# Patient Record
Sex: MALE | Race: WHITE | NOT HISPANIC OR LATINO | URBAN - METROPOLITAN AREA
[De-identification: names, ages, dates, MRNs, and addresses within clinical notes are randomized per-mention and may not be internally consistent; named-entity substitution may affect disease eponyms.]

---

## 2017-01-13 ENCOUNTER — EMERGENCY (EMERGENCY)
Facility: HOSPITAL | Age: 67
LOS: 1 days | End: 2017-01-13
Payer: MEDICARE

## 2017-01-13 DIAGNOSIS — K64.9 UNSPECIFIED HEMORRHOIDS: Chronic | ICD-10-CM

## 2017-01-13 DIAGNOSIS — M51.9 UNSPECIFIED THORACIC, THORACOLUMBAR AND LUMBOSACRAL INTERVERTEBRAL DISC DISORDER: Chronic | ICD-10-CM

## 2017-01-13 PROCEDURE — 71010: CPT | Mod: 26

## 2017-01-13 PROCEDURE — 99285 EMERGENCY DEPT VISIT HI MDM: CPT

## 2017-01-16 ENCOUNTER — APPOINTMENT (OUTPATIENT)
Dept: CARDIOLOGY | Facility: CLINIC | Age: 67
End: 2017-01-16

## 2017-03-10 ENCOUNTER — APPOINTMENT (OUTPATIENT)
Dept: CARDIOLOGY | Facility: CLINIC | Age: 67
End: 2017-03-10

## 2018-11-15 ENCOUNTER — OUTPATIENT (OUTPATIENT)
Dept: OUTPATIENT SERVICES | Facility: HOSPITAL | Age: 68
LOS: 1 days | End: 2018-11-15

## 2018-11-15 ENCOUNTER — EMERGENCY (EMERGENCY)
Facility: HOSPITAL | Age: 68
LOS: 1 days | End: 2018-11-15
Payer: MEDICARE

## 2018-11-15 DIAGNOSIS — M51.9 UNSPECIFIED THORACIC, THORACOLUMBAR AND LUMBOSACRAL INTERVERTEBRAL DISC DISORDER: Chronic | ICD-10-CM

## 2018-11-15 DIAGNOSIS — K64.9 UNSPECIFIED HEMORRHOIDS: Chronic | ICD-10-CM

## 2018-11-15 PROCEDURE — 71046 X-RAY EXAM CHEST 2 VIEWS: CPT | Mod: 26

## 2018-11-15 PROCEDURE — 99285 EMERGENCY DEPT VISIT HI MDM: CPT

## 2018-11-15 PROCEDURE — 76705 ECHO EXAM OF ABDOMEN: CPT | Mod: 26

## 2018-11-16 ENCOUNTER — OUTPATIENT (OUTPATIENT)
Dept: OUTPATIENT SERVICES | Facility: HOSPITAL | Age: 68
LOS: 1 days | End: 2018-11-16

## 2018-11-16 DIAGNOSIS — M51.9 UNSPECIFIED THORACIC, THORACOLUMBAR AND LUMBOSACRAL INTERVERTEBRAL DISC DISORDER: Chronic | ICD-10-CM

## 2018-11-16 DIAGNOSIS — K64.9 UNSPECIFIED HEMORRHOIDS: Chronic | ICD-10-CM

## 2018-11-16 PROCEDURE — 78226 HEPATOBILIARY SYSTEM IMAGING: CPT | Mod: 26

## 2018-11-16 PROCEDURE — 99214 OFFICE O/P EST MOD 30 MIN: CPT

## 2018-12-11 ENCOUNTER — APPOINTMENT (OUTPATIENT)
Dept: CARDIOLOGY | Facility: CLINIC | Age: 68
End: 2018-12-11
Payer: MEDICARE

## 2018-12-11 ENCOUNTER — NON-APPOINTMENT (OUTPATIENT)
Age: 68
End: 2018-12-11

## 2018-12-11 VITALS
DIASTOLIC BLOOD PRESSURE: 80 MMHG | BODY MASS INDEX: 27.52 KG/M2 | RESPIRATION RATE: 14 BRPM | SYSTOLIC BLOOD PRESSURE: 162 MMHG | WEIGHT: 201 LBS | HEIGHT: 71.5 IN | OXYGEN SATURATION: 97 % | HEART RATE: 74 BPM

## 2018-12-11 DIAGNOSIS — Z00.00 ENCOUNTER FOR GENERAL ADULT MEDICAL EXAMINATION W/OUT ABNORMAL FINDINGS: ICD-10-CM

## 2018-12-11 DIAGNOSIS — Z78.9 OTHER SPECIFIED HEALTH STATUS: ICD-10-CM

## 2018-12-11 PROCEDURE — 99215 OFFICE O/P EST HI 40 MIN: CPT

## 2018-12-11 RX ORDER — AZILSARTAN KAMEDOXOMIL AND CHLORTHALIDONE 40; 25 MG/1; MG/1
40-25 TABLET ORAL
Refills: 0 | Status: DISCONTINUED | COMMUNITY

## 2018-12-12 RX ORDER — AZILSARTAN KAMEDOXOMIL AND CHLORTHALIDONE 40; 25 MG/1; MG/1
40-25 TABLET ORAL DAILY
Qty: 90 | Refills: 0 | Status: ACTIVE | COMMUNITY
Start: 1900-01-01 | End: 1900-01-01

## 2018-12-12 NOTE — REVIEW OF SYSTEMS
[Shortness Of Breath] : no shortness of breath [Chest Pain] : no chest pain [Dizziness] : no dizziness

## 2018-12-12 NOTE — REASON FOR VISIT
[FreeTextEntry1] : Mr. rBowne is a 68-year-old man that came in today for cardiac followup. She was last seen in the office 1-16-17. \par He presented to [facility] on 11-15-18 with complaints of sudden onset of left upper quadrant discomfort that radiated to the epigastric area to substernum,. He was also complaining of left arm tingling, diaphoresis and lightheadedness. Episode lasted approximately 2 hours. Blood pressure upon arrival 187/78 with a pulse of 55. (White count was slightly elevated at 11.1 on 11-15-18, AST/ALT were 119/66 and lipase was elevated at 91.)\par Given the diaphoresis and associated symptoms he was placed on telemetry to r/o ACS. Cholelithiasis was noted on abdominal ultrasound. (patient states was told not surgical candidate). Requesting discharge summary. \par Cardiac consult noted no EKG changes and negative troponins. Overall low likelihood of ACS. Given his hypertension losartan/hctz was substituted for Edarbyclor and patient was temporarily put on Toprol and Norvasc. Outpatient stress testing was advised.\par \par Since discharge he has changed his diet and has felt well.  He denies chest pain, sob, palpitaitons, dizziness, syncope, orthopnea and PND. 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 No exercise regimen. \par  \par As you know he is a history of hypertension, nonobstructive CAD by CTA.\par \par Labs/tests\par \par Echocardiogram 7-19-16 60% with normal ventricular function, mild diastolic dysfunction, mild LAE, aneurysmal interatrial septum, descending aorta 4.0, trace to mild MR/TR\par \par Carotid ultrasound 7-19-16 mild nonobstructive disease bilaterally\par \par CT chest 6-11-15 calcium score 1256, LAD 50%, left circumflex  less than 50% and RCA less than 50%\par \par EKG 11-15-18 sinus bradycardia with LAD, LAHB, IVCD, PRP and nonspecific ST segment changes\par \par Labs 11-16-18 White blood cell 5.0, hemoglobin 14, hematocrit 42.4, platelets 208, d-dimer less than 200, sodium 142, potassium 4.0, BUN 16, creatinine 0.8, , , magnesium 2.0, amylase 72, lipase 42\par \par Labs 11-15-18 : < 0.010 x 2, BNP 55.5\par \par Chest x-ray 11-16-18 no active disease\par \par Abdominal ultrasound 11-16-18 cholelithiasis with borderline wall thickening of the gallbladder raising suspicion for cholecystitis\par \par HIDA scan 11-16-18 unremarkable

## 2018-12-12 NOTE — PHYSICAL EXAM
[Normal Appearance] : normal appearance [No Deformities] : no deformities [] : no respiratory distress [Respiration, Rhythm And Depth] : normal respiratory rhythm and effort [Exaggerated Use Of Accessory Muscles For Inspiration] : no accessory muscle use [Heart Rate And Rhythm] : heart rate and rhythm were normal [Heart Sounds] : normal S1 and S2 [Bowel Sounds] : normal bowel sounds [Abdomen Soft] : soft [Abdomen Tenderness] : non-tender [Abnormal Walk] : normal gait [Nail Clubbing] : no clubbing of the fingernails [Cyanosis, Localized] : no localized cyanosis [Affect] : the affect was normal [Mood] : the mood was normal [FreeTextEntry1] : mild b/l l/e edema with 2+ b/l l/e distal pulse

## 2018-12-12 NOTE — ASSESSMENT
[FreeTextEntry1] : PLAN 12-11-18\par \par -Left upper quadrant discomfort radiating to the sternum with cholilithiasis noted on abdominal ultrasound. ACS ruled out in the hospital. Patient has remained asymptomatic with a good level of functional status since he changed his diet. Given his known CAD by CTA as mentioned above and additional risk factors of age, gender, and unclear lipid profile we have recommended a nuclear stress test for further ischemic evaluation. We have also recommended an echocardiogram to further evaluate EF, ventricular wall motion, valvular function, chamber size and valvular pathology.He is currently stable and asymptomatic. He's advised to start baby aspirin 81 mg daily which he is aware of but still has not done. \par \par -lab requisition supplied to follow lipid and hepatic function (note previous hepatic function elevation). \par \par -Hypertension. Repeat blood pressure 156/84. Currently on Edarbyclor. He has been advised to start Norvasc 5 mg one tablet p.o. q.d. To follow strict lifestyle modification. To follow blood pressure at next visit. (note bradycardia on holter in the past)\par \par -Cholelithiasis, deemed not a surgical candidate. Would advise patient to follow closely with primary and GI\par \par -VHD. To be followed by echo as above. \par \par The patient is aware of the importance of f/u. We will follow after recommended testing has been completed, sooner if changes in symptoms or status.\par \par Sincerely,\par \par Thuy Mcdowell PA-C\par patient reviewed and plan advised by supervising physician Dr. Grijalva\par \par \par \par

## 2020-04-28 ENCOUNTER — EMERGENCY (EMERGENCY)
Facility: HOSPITAL | Age: 70
LOS: 1 days | End: 2020-04-28
Admitting: EMERGENCY MEDICINE
Payer: MEDICARE

## 2020-04-28 ENCOUNTER — INPATIENT (INPATIENT)
Facility: HOSPITAL | Age: 70
LOS: 7 days | Discharge: HOME | DRG: 439 | End: 2020-05-06
Attending: SURGERY | Admitting: SURGERY
Payer: MEDICARE

## 2020-04-28 VITALS
OXYGEN SATURATION: 97 % | WEIGHT: 175.05 LBS | DIASTOLIC BLOOD PRESSURE: 88 MMHG | HEIGHT: 68 IN | TEMPERATURE: 99 F | SYSTOLIC BLOOD PRESSURE: 188 MMHG | RESPIRATION RATE: 16 BRPM | HEART RATE: 62 BPM

## 2020-04-28 DIAGNOSIS — M51.9 UNSPECIFIED THORACIC, THORACOLUMBAR AND LUMBOSACRAL INTERVERTEBRAL DISC DISORDER: Chronic | ICD-10-CM

## 2020-04-28 DIAGNOSIS — K64.9 UNSPECIFIED HEMORRHOIDS: Chronic | ICD-10-CM

## 2020-04-28 PROCEDURE — 76705 ECHO EXAM OF ABDOMEN: CPT | Mod: 26

## 2020-04-28 PROCEDURE — 74177 CT ABD & PELVIS W/CONTRAST: CPT | Mod: 26

## 2020-04-28 PROCEDURE — 99285 EMERGENCY DEPT VISIT HI MDM: CPT

## 2020-04-28 RX ORDER — ONDANSETRON 8 MG/1
4 TABLET, FILM COATED ORAL ONCE
Refills: 0 | Status: COMPLETED | OUTPATIENT
Start: 2020-04-28 | End: 2020-04-29

## 2020-04-28 RX ORDER — MORPHINE SULFATE 50 MG/1
4 CAPSULE, EXTENDED RELEASE ORAL ONCE
Refills: 0 | Status: DISCONTINUED | OUTPATIENT
Start: 2020-04-28 | End: 2020-04-28

## 2020-04-28 NOTE — ED PROVIDER NOTE - CLINICAL SUMMARY MEDICAL DECISION MAKING FREE TEXT BOX
70 y/o M pt BIBA with significant PMHx of presents to the ED c/o 7/10 non-radiating, left sided abdominal pain that started last night, with associated N/V. 70 y/o M pt BIBA with significant PMHx of HTN presents to the ED c/o 7/10 non-radiating, left sided abdominal pain that started last night, with associated nausea abdominal 5 episodes of NBNB emesis. 70 y/o M pt BIBA with significant PMHx of HTN presents to the ED c/o 7/10 non-radiating, left sided abdominal pain that started last night, with associated nausea abdominal 5 episodes of NBNB emesis. Pt transferred fro, [facility] for pancreatitis and cholcystitis, will admit to surgery for further management

## 2020-04-28 NOTE — ED ADULT NURSE NOTE - OBJECTIVE STATEMENT
Pt. presents A+Ox4 to ED as TRANSFER from [facility]. Pt. sent for +rodriguez, needing ERCP intervention as per transfer center. Pt. presents calm, cooperative, and smiling, complaining of some mild nausea and 7/10 generalized abdominal pain. Pt. reports having emesis earlier today and en route to [facility]. Pt. denies diarrhea, fever, body aches, chills. 20g R AC and 20g L Forearm IV's from [facility], intact and patent with good blood return. Pt. CLARK, skin warm and dry, pt ambulating independently without difficulty in room . Pt. presents A+Ox4 to ED as TRANSFER from [facility]. Pt. sent for +cholecystitis, sent for GI/surg. Pt. presents calm, cooperative, and smiling, complaining of some mild nausea and 7/10 generalized abdominal pain. Pt. reports having emesis earlier today and en route to [facility]. No active vomiting at this time. Pt. denies nausea at this time. Pt. denies diarrhea, fever, body aches, chills. 20g R AC and 20g L Forearm IV's from [facility], intact and patent with good blood return. Pt. CLARK, skin warm and dry, pt ambulating independently without difficulty in room . Pt. presents A+Ox4 to ED as TRANSFER from Health system. Pt. sent for +cholecystitis, sent for GI/surg. Pt. presents calm, cooperative, and smiling, complaining of some mild nausea and 7/10 generalized abdominal pain. Pt. reports having emesis earlier today and en route to [facility]. No active vomiting at this time. Pt. denies diarrhea, fever, body aches, chills. 20g R AC and 20g L Forearm IV's from [facility], intact and patent with good blood return. Pt. CLARK, skin warm and dry, slightly jaundiced appearance. Pt ambulating independently without difficulty in room.

## 2020-04-28 NOTE — ED ADULT TRIAGE NOTE - CHIEF COMPLAINT QUOTE
Pt A&Ox4 states "I was having stomach pain."  BIBA from [facility] [place] c/o Left sided abd pain and vomiting being sent for ERCP, has Cholecystitis. Patient denies any sick contacts.

## 2020-04-28 NOTE — ED ADULT NURSE NOTE - CHIEF COMPLAINT QUOTE
Pt A&Ox4 states "I was having stomach pain."  BIBA from [facility] c/o Left sided abd pain and vomiting being sent for ERCP, has Cholecystitis. Patient denies any sick contacts.

## 2020-04-28 NOTE — ED ADULT NURSE NOTE - PMH
Bigeminy    BPH (benign prostatic hypertrophy)    Exostosis  left ear canal  Hard of hearing  Exostosis left ear  in May 2015  Hypertension    Lumbar disc disease

## 2020-04-28 NOTE — ED PROVIDER NOTE - OBJECTIVE STATEMENT
70 y/o M pt BIBA with significant PMHx of HTN presents to the ED c/o 7/10 non-radiating, left sided abdominal pain that started last night, with associated nausea abdominal 5 episodes of NBNB emesis. Pt states that the pain started after he ate dinner and adds that for the past few weeks he has been eating a lot of cheese. Pt is a transfer from [facility]. At [facility] pt had blood work showing elevated LFTs, AST of 910, ALT of 689, Lipase 2381, Amylase 1140, bilirubin 3.2, and direct april 3.43. Pt also had a CT abd/pelvis that showed mild interstitial pancreatitis, with gallbladder wall thickening. Concern for acute colicystitis. Ultrasound showed gallstones with evidence for acute colicystitis. Pt denies fevers/chills, ha, loc, focal neuro deficits, cp/sob/palp, cough, diarrhea, urinary symptoms, recent travel and sick contacts. 68 y/o M pt BIBA with significant PMHx of HTN presents to the ED c/o 7/10 non-radiating, left sided abdominal pain that started last night, with associated nausea abdominal 5 episodes of NBNB emesis. Pt states that the pain started after he ate dinner and adds that for the past few weeks he has been eating a lot of cheese. 2 years ago he had a similar problem and had an MRI done that did not show "much" as per pt, however, the pain resolved so he didn't worry. Pt is a transfer from [facility] [place] [facility]. At [facility] pt had blood work showing elevated LFTs, AST of 910, ALT of 689, Lipase 2381, Amylase 1140, bilirubin 3.2, and direct april 2.4. Pt also had a CT abd/pelvis that showed mild interstitial pancreatitis, with gallbladder wall thickening. Concern for acute colicystitis. Ultrasound showed gallstones with evidence for acute colicystitis. Pt received Morphine and Zofran at 15:20, and Zosyn at 20:00. Pt is on Edarbyclor but has been off of it for 2 days. Pt denies fevers/chills, ha, loc, focal neuro deficits, cp/sob/palp, cough, diarrhea, urinary symptoms, recent travel and sick contacts.

## 2020-04-29 DIAGNOSIS — K85.90 ACUTE PANCREATITIS WITHOUT NECROSIS OR INFECTION, UNSPECIFIED: ICD-10-CM

## 2020-04-29 LAB
ALBUMIN SERPL ELPH-MCNC: 3.7 G/DL — SIGNIFICANT CHANGE UP (ref 3.3–5.2)
ALBUMIN SERPL ELPH-MCNC: 3.8 G/DL — SIGNIFICANT CHANGE UP (ref 3.3–5.2)
ALP SERPL-CCNC: 86 U/L — SIGNIFICANT CHANGE UP (ref 40–120)
ALP SERPL-CCNC: 89 U/L — SIGNIFICANT CHANGE UP (ref 40–120)
ALT FLD-CCNC: 597 U/L — HIGH
ALT FLD-CCNC: 696 U/L — HIGH
ANION GAP SERPL CALC-SCNC: 14 MMOL/L — SIGNIFICANT CHANGE UP (ref 5–17)
ANION GAP SERPL CALC-SCNC: 14 MMOL/L — SIGNIFICANT CHANGE UP (ref 5–17)
AST SERPL-CCNC: 466 U/L — HIGH
AST SERPL-CCNC: 639 U/L — HIGH
BASOPHILS # BLD AUTO: 0.02 K/UL — SIGNIFICANT CHANGE UP (ref 0–0.2)
BASOPHILS # BLD AUTO: 0.03 K/UL — SIGNIFICANT CHANGE UP (ref 0–0.2)
BASOPHILS NFR BLD AUTO: 0.2 % — SIGNIFICANT CHANGE UP (ref 0–2)
BASOPHILS NFR BLD AUTO: 0.3 % — SIGNIFICANT CHANGE UP (ref 0–2)
BILIRUB DIRECT SERPL-MCNC: 1.2 MG/DL — HIGH (ref 0–0.3)
BILIRUB DIRECT SERPL-MCNC: 1.8 MG/DL — HIGH (ref 0–0.3)
BILIRUB INDIRECT FLD-MCNC: 1.1 MG/DL — HIGH (ref 0.2–1)
BILIRUB SERPL-MCNC: 2.3 MG/DL — HIGH (ref 0.4–2)
BILIRUB SERPL-MCNC: 2.7 MG/DL — HIGH (ref 0.4–2)
BUN SERPL-MCNC: 22 MG/DL — HIGH (ref 8–20)
BUN SERPL-MCNC: 24 MG/DL — HIGH (ref 8–20)
CALCIUM SERPL-MCNC: 8.8 MG/DL — SIGNIFICANT CHANGE UP (ref 8.6–10.2)
CALCIUM SERPL-MCNC: 9.1 MG/DL — SIGNIFICANT CHANGE UP (ref 8.6–10.2)
CHLORIDE SERPL-SCNC: 100 MMOL/L — SIGNIFICANT CHANGE UP (ref 98–107)
CHLORIDE SERPL-SCNC: 96 MMOL/L — LOW (ref 98–107)
CO2 SERPL-SCNC: 29 MMOL/L — SIGNIFICANT CHANGE UP (ref 22–29)
CO2 SERPL-SCNC: 30 MMOL/L — HIGH (ref 22–29)
CREAT SERPL-MCNC: 1.01 MG/DL — SIGNIFICANT CHANGE UP (ref 0.5–1.3)
CREAT SERPL-MCNC: 1.08 MG/DL — SIGNIFICANT CHANGE UP (ref 0.5–1.3)
EOSINOPHIL # BLD AUTO: 0.01 K/UL — SIGNIFICANT CHANGE UP (ref 0–0.5)
EOSINOPHIL # BLD AUTO: 0.08 K/UL — SIGNIFICANT CHANGE UP (ref 0–0.5)
EOSINOPHIL NFR BLD AUTO: 0.1 % — SIGNIFICANT CHANGE UP (ref 0–6)
EOSINOPHIL NFR BLD AUTO: 0.8 % — SIGNIFICANT CHANGE UP (ref 0–6)
GLUCOSE SERPL-MCNC: 124 MG/DL — HIGH (ref 70–99)
GLUCOSE SERPL-MCNC: 154 MG/DL — HIGH (ref 70–99)
HCT VFR BLD CALC: 41.5 % — SIGNIFICANT CHANGE UP (ref 39–50)
HCT VFR BLD CALC: 42.4 % — SIGNIFICANT CHANGE UP (ref 39–50)
HGB BLD-MCNC: 13.4 G/DL — SIGNIFICANT CHANGE UP (ref 13–17)
HGB BLD-MCNC: 14.1 G/DL — SIGNIFICANT CHANGE UP (ref 13–17)
IMM GRANULOCYTES NFR BLD AUTO: 0.4 % — SIGNIFICANT CHANGE UP (ref 0–1.5)
IMM GRANULOCYTES NFR BLD AUTO: 0.5 % — SIGNIFICANT CHANGE UP (ref 0–1.5)
LIDOCAIN IGE QN: 1281 U/L — HIGH (ref 22–51)
LIDOCAIN IGE QN: 858 U/L — HIGH (ref 22–51)
LYMPHOCYTES # BLD AUTO: 0.65 K/UL — LOW (ref 1–3.3)
LYMPHOCYTES # BLD AUTO: 1.23 K/UL — SIGNIFICANT CHANGE UP (ref 1–3.3)
LYMPHOCYTES # BLD AUTO: 12.4 % — LOW (ref 13–44)
LYMPHOCYTES # BLD AUTO: 6.1 % — LOW (ref 13–44)
MAGNESIUM SERPL-MCNC: 1.9 MG/DL — SIGNIFICANT CHANGE UP (ref 1.6–2.6)
MAGNESIUM SERPL-MCNC: 1.9 MG/DL — SIGNIFICANT CHANGE UP (ref 1.6–2.6)
MCHC RBC-ENTMCNC: 28.6 PG — SIGNIFICANT CHANGE UP (ref 27–34)
MCHC RBC-ENTMCNC: 29.1 PG — SIGNIFICANT CHANGE UP (ref 27–34)
MCHC RBC-ENTMCNC: 32.3 GM/DL — SIGNIFICANT CHANGE UP (ref 32–36)
MCHC RBC-ENTMCNC: 33.3 GM/DL — SIGNIFICANT CHANGE UP (ref 32–36)
MCV RBC AUTO: 87.6 FL — SIGNIFICANT CHANGE UP (ref 80–100)
MCV RBC AUTO: 88.7 FL — SIGNIFICANT CHANGE UP (ref 80–100)
MONOCYTES # BLD AUTO: 0.68 K/UL — SIGNIFICANT CHANGE UP (ref 0–0.9)
MONOCYTES # BLD AUTO: 0.71 K/UL — SIGNIFICANT CHANGE UP (ref 0–0.9)
MONOCYTES NFR BLD AUTO: 6.6 % — SIGNIFICANT CHANGE UP (ref 2–14)
MONOCYTES NFR BLD AUTO: 6.9 % — SIGNIFICANT CHANGE UP (ref 2–14)
NEUTROPHILS # BLD AUTO: 7.86 K/UL — HIGH (ref 1.8–7.4)
NEUTROPHILS # BLD AUTO: 9.29 K/UL — HIGH (ref 1.8–7.4)
NEUTROPHILS NFR BLD AUTO: 79.3 % — HIGH (ref 43–77)
NEUTROPHILS NFR BLD AUTO: 86.4 % — HIGH (ref 43–77)
PHOSPHATE SERPL-MCNC: 3.2 MG/DL — SIGNIFICANT CHANGE UP (ref 2.4–4.7)
PLATELET # BLD AUTO: 201 K/UL — SIGNIFICANT CHANGE UP (ref 150–400)
PLATELET # BLD AUTO: 217 K/UL — SIGNIFICANT CHANGE UP (ref 150–400)
POTASSIUM SERPL-MCNC: 3.7 MMOL/L — SIGNIFICANT CHANGE UP (ref 3.5–5.3)
POTASSIUM SERPL-MCNC: 3.9 MMOL/L — SIGNIFICANT CHANGE UP (ref 3.5–5.3)
POTASSIUM SERPL-SCNC: 3.7 MMOL/L — SIGNIFICANT CHANGE UP (ref 3.5–5.3)
POTASSIUM SERPL-SCNC: 3.9 MMOL/L — SIGNIFICANT CHANGE UP (ref 3.5–5.3)
PROT SERPL-MCNC: 6.5 G/DL — LOW (ref 6.6–8.7)
PROT SERPL-MCNC: 6.8 G/DL — SIGNIFICANT CHANGE UP (ref 6.6–8.7)
RBC # BLD: 4.68 M/UL — SIGNIFICANT CHANGE UP (ref 4.2–5.8)
RBC # BLD: 4.84 M/UL — SIGNIFICANT CHANGE UP (ref 4.2–5.8)
RBC # FLD: 13.9 % — SIGNIFICANT CHANGE UP (ref 10.3–14.5)
RBC # FLD: 14.5 % — SIGNIFICANT CHANGE UP (ref 10.3–14.5)
SARS-COV-2 RNA SPEC QL NAA+PROBE: SIGNIFICANT CHANGE UP
SODIUM SERPL-SCNC: 140 MMOL/L — SIGNIFICANT CHANGE UP (ref 135–145)
SODIUM SERPL-SCNC: 143 MMOL/L — SIGNIFICANT CHANGE UP (ref 135–145)
WBC # BLD: 10.74 K/UL — HIGH (ref 3.8–10.5)
WBC # BLD: 9.91 K/UL — SIGNIFICANT CHANGE UP (ref 3.8–10.5)
WBC # FLD AUTO: 10.74 K/UL — HIGH (ref 3.8–10.5)
WBC # FLD AUTO: 9.91 K/UL — SIGNIFICANT CHANGE UP (ref 3.8–10.5)

## 2020-04-29 PROCEDURE — 99222 1ST HOSP IP/OBS MODERATE 55: CPT

## 2020-04-29 PROCEDURE — 74183 MRI ABD W/O CNTR FLWD CNTR: CPT | Mod: 26

## 2020-04-29 RX ORDER — SODIUM CHLORIDE 9 MG/ML
1000 INJECTION INTRAMUSCULAR; INTRAVENOUS; SUBCUTANEOUS
Refills: 0 | Status: DISCONTINUED | OUTPATIENT
Start: 2020-04-29 | End: 2020-04-29

## 2020-04-29 RX ORDER — POTASSIUM CHLORIDE 20 MEQ
10 PACKET (EA) ORAL
Refills: 0 | Status: DISCONTINUED | OUTPATIENT
Start: 2020-04-29 | End: 2020-04-29

## 2020-04-29 RX ORDER — ACETAMINOPHEN 500 MG
650 TABLET ORAL EVERY 6 HOURS
Refills: 0 | Status: DISCONTINUED | OUTPATIENT
Start: 2020-04-29 | End: 2020-05-06

## 2020-04-29 RX ORDER — AZILSARTAN KAMEDOXOMIL AND CHLORTHALIDONE 40; 12.5 MG/1; MG/1
1 TABLET ORAL
Qty: 0 | Refills: 0 | DISCHARGE

## 2020-04-29 RX ORDER — SENNA PLUS 8.6 MG/1
2 TABLET ORAL AT BEDTIME
Refills: 0 | Status: DISCONTINUED | OUTPATIENT
Start: 2020-04-29 | End: 2020-05-06

## 2020-04-29 RX ORDER — TRAMADOL HYDROCHLORIDE 50 MG/1
25 TABLET ORAL EVERY 6 HOURS
Refills: 0 | Status: DISCONTINUED | OUTPATIENT
Start: 2020-04-29 | End: 2020-05-03

## 2020-04-29 RX ORDER — ENOXAPARIN SODIUM 100 MG/ML
30 INJECTION SUBCUTANEOUS EVERY 12 HOURS
Refills: 0 | Status: DISCONTINUED | OUTPATIENT
Start: 2020-04-29 | End: 2020-05-06

## 2020-04-29 RX ORDER — PIPERACILLIN AND TAZOBACTAM 4; .5 G/20ML; G/20ML
3.38 INJECTION, POWDER, LYOPHILIZED, FOR SOLUTION INTRAVENOUS ONCE
Refills: 0 | Status: COMPLETED | OUTPATIENT
Start: 2020-04-29 | End: 2020-04-29

## 2020-04-29 RX ORDER — CHLORHEXIDINE GLUCONATE 213 G/1000ML
1 SOLUTION TOPICAL
Refills: 0 | Status: DISCONTINUED | OUTPATIENT
Start: 2020-04-29 | End: 2020-05-06

## 2020-04-29 RX ORDER — HYDRALAZINE HCL 50 MG
10 TABLET ORAL EVERY 4 HOURS
Refills: 0 | Status: DISCONTINUED | OUTPATIENT
Start: 2020-04-29 | End: 2020-05-06

## 2020-04-29 RX ORDER — ONDANSETRON 8 MG/1
4 TABLET, FILM COATED ORAL EVERY 6 HOURS
Refills: 0 | Status: DISCONTINUED | OUTPATIENT
Start: 2020-04-29 | End: 2020-05-06

## 2020-04-29 RX ORDER — TRAMADOL HYDROCHLORIDE 50 MG/1
50 TABLET ORAL EVERY 6 HOURS
Refills: 0 | Status: DISCONTINUED | OUTPATIENT
Start: 2020-04-29 | End: 2020-05-03

## 2020-04-29 RX ORDER — LOSARTAN POTASSIUM 100 MG/1
25 TABLET, FILM COATED ORAL DAILY
Refills: 0 | Status: DISCONTINUED | OUTPATIENT
Start: 2020-04-29 | End: 2020-05-06

## 2020-04-29 RX ORDER — PANTOPRAZOLE SODIUM 20 MG/1
40 TABLET, DELAYED RELEASE ORAL
Refills: 0 | Status: DISCONTINUED | OUTPATIENT
Start: 2020-04-29 | End: 2020-05-06

## 2020-04-29 RX ORDER — MORPHINE SULFATE 50 MG/1
4 CAPSULE, EXTENDED RELEASE ORAL ONCE
Refills: 0 | Status: DISCONTINUED | OUTPATIENT
Start: 2020-04-29 | End: 2020-04-29

## 2020-04-29 RX ORDER — SODIUM CHLORIDE 9 MG/ML
1000 INJECTION INTRAMUSCULAR; INTRAVENOUS; SUBCUTANEOUS
Refills: 0 | Status: DISCONTINUED | OUTPATIENT
Start: 2020-04-29 | End: 2020-05-03

## 2020-04-29 RX ADMIN — PIPERACILLIN AND TAZOBACTAM 200 GRAM(S): 4; .5 INJECTION, POWDER, LYOPHILIZED, FOR SOLUTION INTRAVENOUS at 05:10

## 2020-04-29 RX ADMIN — SODIUM CHLORIDE 100 MILLILITER(S): 9 INJECTION INTRAMUSCULAR; INTRAVENOUS; SUBCUTANEOUS at 10:38

## 2020-04-29 RX ADMIN — ONDANSETRON 4 MILLIGRAM(S): 8 TABLET, FILM COATED ORAL at 00:25

## 2020-04-29 RX ADMIN — SODIUM CHLORIDE 100 MILLILITER(S): 9 INJECTION INTRAMUSCULAR; INTRAVENOUS; SUBCUTANEOUS at 08:19

## 2020-04-29 RX ADMIN — PANTOPRAZOLE SODIUM 40 MILLIGRAM(S): 20 TABLET, DELAYED RELEASE ORAL at 08:19

## 2020-04-29 RX ADMIN — Medication 100 MILLIEQUIVALENT(S): at 10:33

## 2020-04-29 RX ADMIN — MORPHINE SULFATE 4 MILLIGRAM(S): 50 CAPSULE, EXTENDED RELEASE ORAL at 00:24

## 2020-04-29 RX ADMIN — MORPHINE SULFATE 4 MILLIGRAM(S): 50 CAPSULE, EXTENDED RELEASE ORAL at 03:56

## 2020-04-29 RX ADMIN — Medication 650 MILLIGRAM(S): at 06:19

## 2020-04-29 RX ADMIN — Medication 650 MILLIGRAM(S): at 12:12

## 2020-04-29 RX ADMIN — Medication 650 MILLIGRAM(S): at 16:40

## 2020-04-29 RX ADMIN — SODIUM CHLORIDE 150 MILLILITER(S): 9 INJECTION INTRAMUSCULAR; INTRAVENOUS; SUBCUTANEOUS at 11:55

## 2020-04-29 RX ADMIN — LOSARTAN POTASSIUM 25 MILLIGRAM(S): 100 TABLET, FILM COATED ORAL at 06:19

## 2020-04-29 RX ADMIN — ENOXAPARIN SODIUM 30 MILLIGRAM(S): 100 INJECTION SUBCUTANEOUS at 16:40

## 2020-04-29 NOTE — H&P ADULT - NSHPPHYSICALEXAM_GEN_ALL_CORE
General: not in acute distress  HEENT: Moist oral mucosa, PERRLA, EOMI  CV: RRR, S1, S2  Pulm: nonlabored breathing  Abd: NT/ND, no rebound tenderness  Vasc: 2+ radial and PT pulses B/L  MSK: no pitting edema B/L  Neuro: AAOX3

## 2020-04-29 NOTE — H&P ADULT - NSHPLABSRESULTS_GEN_ALL_CORE
CT: mild interstitial pancreatitis with gallbladder wall thickening, pericholecystic fluid  US: gallstones, diffuse wall thickening and pericholecystic fluid, CBD 5 mm no intrahepatic ductal dilation

## 2020-04-29 NOTE — H&P ADULT - ASSESSMENT
69 year old male with gallstone pancreatitis    -Admit to surgery  -IVFs  -pain management  -MRCP  -PO challenge  -DVT ppx  -patient will need interval cholecystectomy once pancreatitis has resolved  -Plan and assessment discussed with attending 69 year old male with gallstone pancreatitis    -Admit to surgery  -LFTs downtrending compared to labs obtained from [facility] TBili 2.7 from 3.2,  from 910, ALT: 696 from 689, Lipase 1,281 from 2,381  -IVFs  -pain management  -MRCP  -PO challenge  -DVT ppx  -patient will need interval cholecystectomy once pancreatitis has resolved  -Plan and assessment discussed with attending

## 2020-04-29 NOTE — H&P ADULT - NSICDXPASTMEDICALHX_GEN_ALL_CORE_FT
PAST MEDICAL HISTORY:  Bigeminy     BPH (benign prostatic hypertrophy)     Exostosis left ear canal    Hard of hearing Exostosis left ear  in May 2015    Hypertension     Lumbar disc disease

## 2020-04-29 NOTE — H&P ADULT - ATTENDING COMMENTS
Pt notified and  appt made   Future Appointments   Date Time Provider Adonis Boggsi   9/12/2019  1:00 PM Gricelda Wong, APRN - CNP SRPX CHF [facility] [place]   9/12/2019  2:20 PM Redd Mo [facility] [place] STR MED MGMT [facility] [place]   9/17/2019  1:00 PM SCHEDULE, NURSE Rusty Ann [address] [facility] [place]   10/10/2019 11:00 AM STR ECHO RM2 STRZ ECHO [facility]   10/30/2019 12:15 PM Ronald Dietz MD SRPX Heart P - [place]   12/3/2019  1:00 PM Arslan Garvin, APRN - [id number] [facility] [place]   3/12/2020  1:30 PM Lester Hernandezgm, [address] [place] Patient seen and examined with resident team. Above note reviewed and edited where appropriate.     Gallstone pancreatitis, pain/tenderness improving, LFTs and lipase downtrending  Admit, NPO, IVF, strict I/O  Will require interval cholecystectomy Patient seen and examined with resident team. Above note reviewed and edited where appropriate.     Gallstone pancreatitis, pain/tenderness improving, LFTs and lipase downtrending  Admit, NPO, IVF, strict I/O  Will require interval cholecystectomy  f/u MRCP  GI consulted, will f/u recommendations following MRCP Patient seen and examined with resident team. Above note reviewed and edited where appropriate.     Gallstone pancreatitis, pain/tenderness improving, LFTs and lipase downtrending  Admit, NPO, IVF, strict I/O  Will require interval cholecystectomy  f/u MRCP

## 2020-04-29 NOTE — H&P ADULT - HISTORY OF PRESENT ILLNESS
69M with PMH significant for BPH, Bigeminy, HTN, Lumbar disc disease, Bigeminy transferred from [facility] on 4/28 found to have gallstone pancreatitis. Patient states that he had severe LUQ sharp pain associated with nausea and emesis. Patient states that 2 years ago he had a similar presentation while admitted to [facility] and patient worked up with MRCP but subsequently discharged as symptoms resolved. Patient was lost to follow up for surgical intervention.

## 2020-04-29 NOTE — H&P ADULT - NSICDXPASTSURGICALHX_GEN_ALL_CORE_FT
PAST SURGICAL HISTORY:  Hemorrhoid 2012  Hemorrhoidectomy    Lumbar disc disease lumbar disc surgery 15 years ago

## 2020-04-30 ENCOUNTER — TRANSCRIPTION ENCOUNTER (OUTPATIENT)
Age: 70
End: 2020-04-30

## 2020-04-30 LAB
ALBUMIN SERPL ELPH-MCNC: 3.2 G/DL — LOW (ref 3.3–5.2)
ALP SERPL-CCNC: 79 U/L — SIGNIFICANT CHANGE UP (ref 40–120)
ALT FLD-CCNC: 387 U/L — HIGH
ANION GAP SERPL CALC-SCNC: 15 MMOL/L — SIGNIFICANT CHANGE UP (ref 5–17)
APPEARANCE UR: CLEAR — SIGNIFICANT CHANGE UP
AST SERPL-CCNC: 199 U/L — HIGH
BACTERIA # UR AUTO: NEGATIVE — SIGNIFICANT CHANGE UP
BASOPHILS # BLD AUTO: 0.03 K/UL — SIGNIFICANT CHANGE UP (ref 0–0.2)
BASOPHILS NFR BLD AUTO: 0.3 % — SIGNIFICANT CHANGE UP (ref 0–2)
BILIRUB DIRECT SERPL-MCNC: 0.5 MG/DL — HIGH (ref 0–0.3)
BILIRUB INDIRECT FLD-MCNC: 0.5 MG/DL — SIGNIFICANT CHANGE UP (ref 0.2–1)
BILIRUB SERPL-MCNC: 1 MG/DL — SIGNIFICANT CHANGE UP (ref 0.4–2)
BILIRUB UR-MCNC: NEGATIVE — SIGNIFICANT CHANGE UP
BUN SERPL-MCNC: 20 MG/DL — SIGNIFICANT CHANGE UP (ref 8–20)
CALCIUM SERPL-MCNC: 8.6 MG/DL — SIGNIFICANT CHANGE UP (ref 8.6–10.2)
CHLORIDE SERPL-SCNC: 101 MMOL/L — SIGNIFICANT CHANGE UP (ref 98–107)
CO2 SERPL-SCNC: 26 MMOL/L — SIGNIFICANT CHANGE UP (ref 22–29)
COLOR SPEC: YELLOW — SIGNIFICANT CHANGE UP
CREAT SERPL-MCNC: 0.91 MG/DL — SIGNIFICANT CHANGE UP (ref 0.5–1.3)
DIFF PNL FLD: NEGATIVE — SIGNIFICANT CHANGE UP
EOSINOPHIL # BLD AUTO: 0.06 K/UL — SIGNIFICANT CHANGE UP (ref 0–0.5)
EOSINOPHIL NFR BLD AUTO: 0.6 % — SIGNIFICANT CHANGE UP (ref 0–6)
EPI CELLS # UR: NEGATIVE — SIGNIFICANT CHANGE UP
GLUCOSE SERPL-MCNC: 119 MG/DL — HIGH (ref 70–99)
GLUCOSE UR QL: NEGATIVE MG/DL — SIGNIFICANT CHANGE UP
HCT VFR BLD CALC: 39.1 % — SIGNIFICANT CHANGE UP (ref 39–50)
HCV AB S/CO SERPL IA: 0.14 S/CO — SIGNIFICANT CHANGE UP (ref 0–0.99)
HCV AB SERPL-IMP: SIGNIFICANT CHANGE UP
HGB BLD-MCNC: 12.6 G/DL — LOW (ref 13–17)
IMM GRANULOCYTES NFR BLD AUTO: 0.5 % — SIGNIFICANT CHANGE UP (ref 0–1.5)
KETONES UR-MCNC: ABNORMAL
LEUKOCYTE ESTERASE UR-ACNC: NEGATIVE — SIGNIFICANT CHANGE UP
LIDOCAIN IGE QN: 108 U/L — HIGH (ref 22–51)
LYMPHOCYTES # BLD AUTO: 1.13 K/UL — SIGNIFICANT CHANGE UP (ref 1–3.3)
LYMPHOCYTES # BLD AUTO: 10.5 % — LOW (ref 13–44)
MAGNESIUM SERPL-MCNC: 1.9 MG/DL — SIGNIFICANT CHANGE UP (ref 1.6–2.6)
MCHC RBC-ENTMCNC: 28.5 PG — SIGNIFICANT CHANGE UP (ref 27–34)
MCHC RBC-ENTMCNC: 32.2 GM/DL — SIGNIFICANT CHANGE UP (ref 32–36)
MCV RBC AUTO: 88.5 FL — SIGNIFICANT CHANGE UP (ref 80–100)
MONOCYTES # BLD AUTO: 0.81 K/UL — SIGNIFICANT CHANGE UP (ref 0–0.9)
MONOCYTES NFR BLD AUTO: 7.5 % — SIGNIFICANT CHANGE UP (ref 2–14)
NEUTROPHILS # BLD AUTO: 8.72 K/UL — HIGH (ref 1.8–7.4)
NEUTROPHILS NFR BLD AUTO: 80.6 % — HIGH (ref 43–77)
NITRITE UR-MCNC: NEGATIVE — SIGNIFICANT CHANGE UP
PH UR: 6 — SIGNIFICANT CHANGE UP (ref 5–8)
PHOSPHATE SERPL-MCNC: 2.5 MG/DL — SIGNIFICANT CHANGE UP (ref 2.4–4.7)
PLATELET # BLD AUTO: 183 K/UL — SIGNIFICANT CHANGE UP (ref 150–400)
POTASSIUM SERPL-MCNC: 3.4 MMOL/L — LOW (ref 3.5–5.3)
POTASSIUM SERPL-SCNC: 3.4 MMOL/L — LOW (ref 3.5–5.3)
PROT SERPL-MCNC: 6 G/DL — LOW (ref 6.6–8.7)
PROT UR-MCNC: 15 MG/DL
RBC # BLD: 4.42 M/UL — SIGNIFICANT CHANGE UP (ref 4.2–5.8)
RBC # FLD: 14.2 % — SIGNIFICANT CHANGE UP (ref 10.3–14.5)
RBC CASTS # UR COMP ASSIST: NEGATIVE /HPF — SIGNIFICANT CHANGE UP (ref 0–4)
SODIUM SERPL-SCNC: 142 MMOL/L — SIGNIFICANT CHANGE UP (ref 135–145)
SP GR SPEC: 1.01 — SIGNIFICANT CHANGE UP (ref 1.01–1.02)
UROBILINOGEN FLD QL: NEGATIVE MG/DL — SIGNIFICANT CHANGE UP
WBC # BLD: 10.8 K/UL — HIGH (ref 3.8–10.5)
WBC # FLD AUTO: 10.8 K/UL — HIGH (ref 3.8–10.5)
WBC UR QL: SIGNIFICANT CHANGE UP

## 2020-04-30 PROCEDURE — 99232 SBSQ HOSP IP/OBS MODERATE 35: CPT

## 2020-04-30 RX ORDER — ACETAMINOPHEN 500 MG
2 TABLET ORAL
Qty: 0 | Refills: 0 | DISCHARGE
Start: 2020-04-30

## 2020-04-30 RX ORDER — POTASSIUM CHLORIDE 20 MEQ
20 PACKET (EA) ORAL
Refills: 0 | Status: COMPLETED | OUTPATIENT
Start: 2020-04-30 | End: 2020-04-30

## 2020-04-30 RX ORDER — SENNA PLUS 8.6 MG/1
2 TABLET ORAL
Qty: 0 | Refills: 0 | DISCHARGE
Start: 2020-04-30

## 2020-04-30 RX ORDER — PANTOPRAZOLE SODIUM 20 MG/1
1 TABLET, DELAYED RELEASE ORAL
Qty: 30 | Refills: 0
Start: 2020-04-30

## 2020-04-30 RX ADMIN — TRAMADOL HYDROCHLORIDE 25 MILLIGRAM(S): 50 TABLET ORAL at 21:58

## 2020-04-30 RX ADMIN — Medication 650 MILLIGRAM(S): at 11:46

## 2020-04-30 RX ADMIN — PANTOPRAZOLE SODIUM 40 MILLIGRAM(S): 20 TABLET, DELAYED RELEASE ORAL at 05:43

## 2020-04-30 RX ADMIN — Medication 650 MILLIGRAM(S): at 05:43

## 2020-04-30 RX ADMIN — SODIUM CHLORIDE 150 MILLILITER(S): 9 INJECTION INTRAMUSCULAR; INTRAVENOUS; SUBCUTANEOUS at 02:30

## 2020-04-30 RX ADMIN — ENOXAPARIN SODIUM 30 MILLIGRAM(S): 100 INJECTION SUBCUTANEOUS at 18:02

## 2020-04-30 RX ADMIN — Medication 50 MILLIEQUIVALENT(S): at 10:57

## 2020-04-30 RX ADMIN — Medication 50 MILLIEQUIVALENT(S): at 13:19

## 2020-04-30 RX ADMIN — Medication 50 MILLIEQUIVALENT(S): at 15:14

## 2020-04-30 RX ADMIN — ENOXAPARIN SODIUM 30 MILLIGRAM(S): 100 INJECTION SUBCUTANEOUS at 05:43

## 2020-04-30 RX ADMIN — TRAMADOL HYDROCHLORIDE 25 MILLIGRAM(S): 50 TABLET ORAL at 15:47

## 2020-04-30 RX ADMIN — LOSARTAN POTASSIUM 25 MILLIGRAM(S): 100 TABLET, FILM COATED ORAL at 05:43

## 2020-04-30 RX ADMIN — SODIUM CHLORIDE 150 MILLILITER(S): 9 INJECTION INTRAMUSCULAR; INTRAVENOUS; SUBCUTANEOUS at 22:00

## 2020-04-30 RX ADMIN — Medication 650 MILLIGRAM(S): at 18:03

## 2020-04-30 RX ADMIN — SENNA PLUS 2 TABLET(S): 8.6 TABLET ORAL at 21:58

## 2020-04-30 NOTE — DISCHARGE NOTE PROVIDER - NSDCCPCAREPLAN_GEN_ALL_CORE_FT
PRINCIPAL DISCHARGE DIAGNOSIS  Diagnosis: Pancreatitis due to biliary obstruction  Assessment and Plan of Treatment: Follow up: Please call and make an appointment with the [facility] Surgery [facility] 10-14 days after discharge. Also, please call and make an appointment with your primary care physician as per your usual schedule.   Activity: May return to normal activities as tolerated, however refrain from heavy lifting > 10-15 lbs.  Diet: May continue regular diet.  Medications: Please take all medications listed on your discharge paperwork. Tylenol 325mg every 8 hrs is okay for moderate pain. Pantoprazole has been prescribed for you. Take this medication before breakfast.   Patient is advised to RETURN TO THE EMERGENCY DEPARTMENT for any of the following - worsening pain, fever/chills, nausea/vomiting, altered mental status, chest pain, shortness of breath, or any other new / worsening symptom.      SECONDARY DISCHARGE DIAGNOSES  Diagnosis: Cholecystitis  Assessment and Plan of Treatment: PRINCIPAL DISCHARGE DIAGNOSIS  Diagnosis: Pancreatitis due to biliary obstruction  Assessment and Plan of Treatment: Follow up: Please call and make an appointment with the Acute Care Surgery Clinic 10-14 days after discharge. Also, please call and make an appointment with your primary care physician as per your usual schedule.   Please make a follow up appointment in 4-5 weeks with Dr. Sheppard for pulmonary followup. - You will need to get a repeat CT of the chest (script provided) prior to the appointment.  Please make a follow up appointment with your urologist for the renal cysts.  Activity: May return to normal activities as tolerated, however refrain from heavy lifting > 10-15 lbs.  Diet: May continue regular diet.  Medications: Please take all medications listed on your discharge paperwork. Tylenol 325mg every 8 hrs is okay for moderate pain. Pantoprazole has been prescribed for you. Take this medication before breakfast.   Patient is advised to RETURN TO THE EMERGENCY DEPARTMENT for any of the following - worsening pain, fever/chills, nausea/vomiting, altered mental status, chest pain, shortness of breath, or any other new / worsening symptom.      SECONDARY DISCHARGE DIAGNOSES  Diagnosis: Cholecystitis  Assessment and Plan of Treatment: PRINCIPAL DISCHARGE DIAGNOSIS  Diagnosis: Pancreatitis due to biliary obstruction  Assessment and Plan of Treatment: Follow up: Please call and make an appointment with the Acute [facility] Surgery Clinic 10-14 days after discharge. Also, please call and make an appointment with your primary care physician as per your usual schedule. If you would like a closer pulmonlogist to follow up with, please ask PCP for pulmonlogy referral so you can obtain a CT chest to monitor bilateral pleural effusions.  Please make a follow up appointment in 4-5 weeks with Dr. Sheppard for pulmonary followup. - You will need to get a repeat CT of the chest.   Please make a follow up appointment with your urologist for the renal cysts.  Activity: May return to normal activities as tolerated.   Diet: May continue regular diet.  Medications: Please take all medications listed on your discharge paperwork. Tylenol 325mg every 8 hrs is okay for moderate pain. Pantoprazole has been prescribed for you. Take this medication before breakfast.   Patient is advised to RETURN TO THE EMERGENCY DEPARTMENT for any of the following - worsening pain, fever/chills, nausea/vomiting, altered mental status, chest pain, shortness of breath, or any other new / worsening symptom.      SECONDARY DISCHARGE DIAGNOSES  Diagnosis: Cholecystitis  Assessment and Plan of Treatment: PRINCIPAL DISCHARGE DIAGNOSIS  Diagnosis: Pancreatitis due to biliary obstruction  Assessment and Plan of Treatment: Follow up: Please call and make an appointment with the [facility] Surgery Clinic 10-14 days after discharge. to discuss plan for outpatient cholecystectomy. Also, please call and make an appointment with your primary care physician as per your usual schedule. If you would like a closer pulmonlogist to follow up with, please ask PCP for pulmonlogy referral so you can obtain a CT chest to monitor bilateral pleural effusions.   Please make a follow up appointment in 4-5 weeks with Dr. Sheppard for pulmonary followup. - You will need to get a repeat CT of the chest.   Please make a follow up appointment with your urologist for the renal cysts.  Activity: May return to normal activities as tolerated.   Diet: May continue regular diet.  Medications: Please take all medications listed on your discharge paperwork. Tylenol 325mg every 8 hrs is okay for moderate pain. Pantoprazole has been prescribed for you. Take this medication before breakfast.   Patient is advised to RETURN TO THE EMERGENCY DEPARTMENT for any of the following - worsening pain, fever/chills, nausea/vomiting, altered mental status, chest pain, shortness of breath, or any other new / worsening symptom.      SECONDARY DISCHARGE DIAGNOSES  Diagnosis: Cholecystitis  Assessment and Plan of Treatment:

## 2020-04-30 NOTE — PROGRESS NOTE ADULT - ASSESSMENT
69 year old male with gallstone pancreatitis, MRCP with no evidence of choledocholithiasis. Medical management     -f/u LFT, downtrending  -NPO/IVFs  -pain management  -DVT ppx  -patient will need interval cholecystectomy once pancreatitis has resolved  -Plan and assessment discussed with attending

## 2020-04-30 NOTE — PROGRESS NOTE ADULT - SUBJECTIVE AND OBJECTIVE BOX
INTERVAL HPI/OVERNIGHT EVENTS:    Patient seen and evaluated at bedside and found hemodynamically stable and in no acute distress. No acute events overnight. Complained of mild pain that has improved through  the day, controlled with PO/IV pain medication. Patient continue NPO with aggressive IVF resuscitation without nausea or emesis. No fevers, chills, chest pain, SOB, coughing, dizziness, n/v/d, or generalized malaise.       SUBJECTIVE:      MEDICATIONS  (STANDING):  acetaminophen   Tablet .. 650 milliGRAM(s) Oral every 6 hours  chlorhexidine 4% Liquid 1 Application(s) Topical <User Schedule>  enoxaparin Injectable 30 milliGRAM(s) SubCutaneous every 12 hours  losartan 25 milliGRAM(s) Oral daily  pantoprazole    Tablet 40 milliGRAM(s) Oral before breakfast  senna 2 Tablet(s) Oral at bedtime  sodium chloride 0.9%. 1000 milliLiter(s) (150 mL/Hr) IV Continuous <Continuous>    MEDICATIONS  (PRN):  hydrALAZINE Injectable 10 milliGRAM(s) IV Push every 4 hours PRN For SBP > 170  ondansetron Injectable 4 milliGRAM(s) IV Push every 6 hours PRN Nausea  traMADol 25 milliGRAM(s) Oral every 6 hours PRN Moderate Pain (4 - 6)  traMADol 50 milliGRAM(s) Oral every 6 hours PRN Severe Pain (7 - 10)      Vital Signs Last 24 Hrs  T(C): 37.3 (29 Apr 2020 20:26), Max: 37.3 (29 Apr 2020 20:26)  T(F): 99.2 (29 Apr 2020 20:26), Max: 99.2 (29 Apr 2020 20:26)  HR: 88 (29 Apr 2020 20:26) (58 - 88)  BP: 143/81 (29 Apr 2020 20:26) (134/78 - 181/81)  BP(mean): --  RR: 18 (29 Apr 2020 20:26) (16 - 19)  SpO2: 98% (29 Apr 2020 20:26) (94% - 98%)    PHYSICAL EXAM:      General: lying in bed in no acute distress  HEENT:Neck supple  Chest: non-labored breathing or conversational dyspnea   Abdomen: non-distended, soft and depressible, tender to palpation in mid-abdomen. No guarding or rebound, non-peritonitic  Ext: no edema or cyanosis      I&O's Detail    29 Apr 2020 07:01  -  30 Apr 2020 01:06  --------------------------------------------------------  IN:    sodium chloride 0.9%: 1250 mL    sodium chloride 0.9%.: 750 mL  Total IN: 2000 mL    OUT:    Voided: 920 mL  Total OUT: 920 mL    Total NET: 1080 mL          LABS:                        13.4   9.91  )-----------( 201      ( 29 Apr 2020 10:28 )             41.5     04-29    143  |  100  |  24.0<H>  ----------------------------<  124<H>  3.9   |  29.0  |  1.08    Ca    8.8      29 Apr 2020 10:28  Phos  3.2     04-29  Mg     1.9     04-29    TPro  6.5<L>  /  Alb  3.8  /  TBili  2.3<H>  /  DBili  1.2<H>  /  AST  466<H>  /  ALT  597<H>  /  AlkPhos  89  04-29      RADIOLOGY & ADDITIONAL STUDIES:    MRCP    IMPRESSION:     No biliary ductal dilatation or common bile duct stone.     Mild interstitial edematous pancreatitis.     Cholelithiasis including a 1.7 cm gallstone in the gallbladder neck with   pericholecystic fluid; clinical correlation is recommended for   cholecystitis; differential for the pericholecystic fluid includes   cholecystitis or secondary changes from pancreatitis.     4 mm cystic lesion in the pancreatic body; a follow-up MRI/MRCP of the   abdomen could be obtained for the cystic lesion in one year.

## 2020-04-30 NOTE — DISCHARGE NOTE PROVIDER - HOSPITAL COURSE
Mr. Browne is a 69M with PMH significant for BPH, Bigeminy, HTN, Lumbar disc disease, [place] [facility] found to have gallstone pancreatitis. Patient states that he had severe LUQ sharp pain associated with nausea and emesis. Patient states that 2 years ago he had a similar presentation while admitted to [facility] and patient worked up with MRCP but subsequently discharged as symptoms resolved. Patient was lost to follow up for surgical intervention.     On HOD#2 patient with improved abdominal pain and discomfort. Fluids in place. Patient ambulating to bathroom and has adequate urine output. His diet was advanced to and he was found to________. Pain well controlled at time of discharge. Mr. Browne is a 69M with PMH significant for BPH, Bigeminy, HTN, Lumbar disc disease, transferred from [facility] [place] on 4/28 found to have gallstone pancreatitis. Patient states that he had severe LUQ sharp pain associated with nausea and emesis. Patient states that 2 years ago he had a similar presentation while admitted to [facility] and patient worked up with MRCP but subsequently discharged as symptoms resolved. Patient was lost to follow up for surgical intervention.     On HOD#2 patient with improved abdominal pain and discomfort. Fluids in place. Patient ambulating to bathroom and has adequate urine output. Tolerating a diet. Pain well controlled at time of discharge. Mr. Browne is a 69M with PMH significant for BPH, Bigeminy, HTN, Lumbar disc disease, transferred from [facility] [place] [facility] on 4/28 found to have gallstone pancreatitis. Patient states that he had severe LUQ sharp pain associated with nausea and emesis. Patient states that 2 years ago he had a similar presentation while admitted to [facility] and patient worked up with MRCP but subsequently discharged as symptoms resolved. Patient was lost to follow up for surgical intervention.     On HOD#2 patient with improved abdominal pain and discomfort. liver enzymes were downtrending. continued abdominal pain, so repeat CT scan of abdomen/pelvis was obtained demonstrating diffuse peripancreatic and focal pancreatic necrosis. no abscess identified. Pulmonology consult was obtained for bilateral pleural effusions who recommended repeat CT chest without contrast in one month as long as no respiratory distress/worsening symptoms. Urology was consulted for renal cysts with no acute intervention needed given patient was asymptomatic. Patient's diet was advanced slowly with improvement in abdominal pain. complained of left lower extremity pain/swelling so duplex of lower extremity was obtained and did not reveal a DVT.  Patient ambulating to bathroom and has adequate urine output. Tolerating a diet.

## 2020-04-30 NOTE — DISCHARGE NOTE PROVIDER - CARE PROVIDERS DIRECT ADDRESSES
,DirectAddress_Unknown ,DirectAddress_Unknown,[email or web address]

## 2020-04-30 NOTE — DISCHARGE NOTE PROVIDER - NSDCMRMEDTOKEN_GEN_ALL_CORE_FT
acetaminophen 325 mg oral tablet: 2 tab(s) orally every 6 hours  Edarbyclor 40 mg-12.5 mg oral tablet: 1 tab(s) orally once a day  pantoprazole 40 mg oral delayed release tablet: 1 tab(s) orally once a day (before a meal) MDD:1  senna oral tablet: 2 tab(s) orally once a day (at bedtime) acetaminophen 325 mg oral tablet: 2 tab(s) orally every 6 hours  Edarbyclor 40 mg-12.5 mg oral tablet: 1 tab(s) orally once a day  pantoprazole 40 mg oral delayed release tablet: 1 tab(s) orally once a day (before a meal) MDD:1  polyethylene glycol 3350 oral powder for reconstitution: 17 gram(s) orally once a day MDD:17 grams dissolved  senna oral tablet: 2 tab(s) orally once a day (at bedtime) MDD:2 TAB  simethicone 80 mg oral tablet, chewable: 1 tab(s) orally every 12 hours, As needed, Gas MDD:1 TAB  simethicone 80 mg oral tablet, chewable: 1 tab(s) orally every 12 hours, As needed, Gas  traMADol 50 mg oral tablet: 0.5 tab(s) orally every 4 hours, As needed, Moderate Pain (4 - 6)  traMADol 50 mg oral tablet: 1 tab(s) orally every 4 hours, As needed, Severe Pain (7 - 10) MDD:3 TAB

## 2020-04-30 NOTE — DISCHARGE NOTE PROVIDER - CARE PROVIDER_API CALL
Josiah Paulino)  Surgery  Bariatric  [address] [place] [id number]  Phone: [phone]  Fax: [phone]  Follow Up Time: 2 weeks Josiah Paulino)  Surgery  Bariatric  [address] [place], [place] [id number]  Phone: [phone]  Fax: [phone]  Follow Up Time: 2 weeks    Basilio Sheppard)  Internal Medicine; Pulmonary Disease  [facility], [address] [place] [address] [address]  [place] [address]  Phone: [phone]  Fax: [phone]  Follow Up Time: 1 month

## 2020-04-30 NOTE — CHART NOTE - NSCHARTNOTEFT_GEN_A_CORE
Family Member called regarding patient care. All questions were answered regarding patient care. Spoke to Amara Browne [phone]

## 2020-04-30 NOTE — DISCHARGE NOTE PROVIDER - PROVIDER TOKENS
PROVIDER:[TOKEN:[[id number]],FOLLOWUP:[2 weeks]] PROVIDER:[TOKEN:[[id number]],FOLLOWUP:[2 weeks]],PROVIDER:[TOKEN:[[id number]],FOLLOWUP:[1 month]]

## 2020-05-01 LAB
ALBUMIN SERPL ELPH-MCNC: 3.4 G/DL — SIGNIFICANT CHANGE UP (ref 3.3–5.2)
ALP SERPL-CCNC: 80 U/L — SIGNIFICANT CHANGE UP (ref 40–120)
ALT FLD-CCNC: 258 U/L — HIGH
ANION GAP SERPL CALC-SCNC: 16 MMOL/L — SIGNIFICANT CHANGE UP (ref 5–17)
AST SERPL-CCNC: 101 U/L — HIGH
BASOPHILS # BLD AUTO: 0.02 K/UL — SIGNIFICANT CHANGE UP (ref 0–0.2)
BASOPHILS NFR BLD AUTO: 0.2 % — SIGNIFICANT CHANGE UP (ref 0–2)
BILIRUB DIRECT SERPL-MCNC: 0.5 MG/DL — HIGH (ref 0–0.3)
BILIRUB INDIRECT FLD-MCNC: 0.7 MG/DL — SIGNIFICANT CHANGE UP (ref 0.2–1)
BILIRUB SERPL-MCNC: 1.2 MG/DL — SIGNIFICANT CHANGE UP (ref 0.4–2)
BUN SERPL-MCNC: 15 MG/DL — SIGNIFICANT CHANGE UP (ref 8–20)
CALCIUM SERPL-MCNC: 7.8 MG/DL — LOW (ref 8.6–10.2)
CHLORIDE SERPL-SCNC: 102 MMOL/L — SIGNIFICANT CHANGE UP (ref 98–107)
CO2 SERPL-SCNC: 22 MMOL/L — SIGNIFICANT CHANGE UP (ref 22–29)
CREAT SERPL-MCNC: 0.7 MG/DL — SIGNIFICANT CHANGE UP (ref 0.5–1.3)
EOSINOPHIL # BLD AUTO: 0.12 K/UL — SIGNIFICANT CHANGE UP (ref 0–0.5)
EOSINOPHIL NFR BLD AUTO: 0.9 % — SIGNIFICANT CHANGE UP (ref 0–6)
GLUCOSE SERPL-MCNC: 119 MG/DL — HIGH (ref 70–99)
HCT VFR BLD CALC: 38.8 % — LOW (ref 39–50)
HGB BLD-MCNC: 12.8 G/DL — LOW (ref 13–17)
IMM GRANULOCYTES NFR BLD AUTO: 0.8 % — SIGNIFICANT CHANGE UP (ref 0–1.5)
LYMPHOCYTES # BLD AUTO: 1.29 K/UL — SIGNIFICANT CHANGE UP (ref 1–3.3)
LYMPHOCYTES # BLD AUTO: 10.1 % — LOW (ref 13–44)
MAGNESIUM SERPL-MCNC: 1.9 MG/DL — SIGNIFICANT CHANGE UP (ref 1.6–2.6)
MCHC RBC-ENTMCNC: 29.2 PG — SIGNIFICANT CHANGE UP (ref 27–34)
MCHC RBC-ENTMCNC: 33 GM/DL — SIGNIFICANT CHANGE UP (ref 32–36)
MCV RBC AUTO: 88.4 FL — SIGNIFICANT CHANGE UP (ref 80–100)
MONOCYTES # BLD AUTO: 0.9 K/UL — SIGNIFICANT CHANGE UP (ref 0–0.9)
MONOCYTES NFR BLD AUTO: 7 % — SIGNIFICANT CHANGE UP (ref 2–14)
NEUTROPHILS # BLD AUTO: 10.4 K/UL — HIGH (ref 1.8–7.4)
NEUTROPHILS NFR BLD AUTO: 81 % — HIGH (ref 43–77)
PHOSPHATE SERPL-MCNC: 2.4 MG/DL — SIGNIFICANT CHANGE UP (ref 2.4–4.7)
PLATELET # BLD AUTO: 192 K/UL — SIGNIFICANT CHANGE UP (ref 150–400)
POTASSIUM SERPL-MCNC: 3.7 MMOL/L — SIGNIFICANT CHANGE UP (ref 3.5–5.3)
POTASSIUM SERPL-SCNC: 3.7 MMOL/L — SIGNIFICANT CHANGE UP (ref 3.5–5.3)
PROT SERPL-MCNC: 6.3 G/DL — LOW (ref 6.6–8.7)
RBC # BLD: 4.39 M/UL — SIGNIFICANT CHANGE UP (ref 4.2–5.8)
RBC # FLD: 14.1 % — SIGNIFICANT CHANGE UP (ref 10.3–14.5)
SODIUM SERPL-SCNC: 140 MMOL/L — SIGNIFICANT CHANGE UP (ref 135–145)
WBC # BLD: 12.83 K/UL — HIGH (ref 3.8–10.5)
WBC # FLD AUTO: 12.83 K/UL — HIGH (ref 3.8–10.5)

## 2020-05-01 PROCEDURE — 99232 SBSQ HOSP IP/OBS MODERATE 35: CPT

## 2020-05-01 RX ORDER — MAGNESIUM SULFATE 500 MG/ML
1 VIAL (ML) INJECTION ONCE
Refills: 0 | Status: COMPLETED | OUTPATIENT
Start: 2020-05-01 | End: 2020-05-01

## 2020-05-01 RX ORDER — POTASSIUM PHOSPHATE, MONOBASIC POTASSIUM PHOSPHATE, DIBASIC 236; 224 MG/ML; MG/ML
30 INJECTION, SOLUTION INTRAVENOUS ONCE
Refills: 0 | Status: COMPLETED | OUTPATIENT
Start: 2020-05-01 | End: 2020-05-01

## 2020-05-01 RX ADMIN — POTASSIUM PHOSPHATE, MONOBASIC POTASSIUM PHOSPHATE, DIBASIC 83.33 MILLIMOLE(S): 236; 224 INJECTION, SOLUTION INTRAVENOUS at 13:59

## 2020-05-01 RX ADMIN — Medication 650 MILLIGRAM(S): at 18:35

## 2020-05-01 RX ADMIN — Medication 10 MILLIGRAM(S): at 23:22

## 2020-05-01 RX ADMIN — Medication 650 MILLIGRAM(S): at 01:18

## 2020-05-01 RX ADMIN — Medication 650 MILLIGRAM(S): at 05:27

## 2020-05-01 RX ADMIN — SENNA PLUS 2 TABLET(S): 8.6 TABLET ORAL at 20:47

## 2020-05-01 RX ADMIN — LOSARTAN POTASSIUM 25 MILLIGRAM(S): 100 TABLET, FILM COATED ORAL at 05:24

## 2020-05-01 RX ADMIN — PANTOPRAZOLE SODIUM 40 MILLIGRAM(S): 20 TABLET, DELAYED RELEASE ORAL at 05:32

## 2020-05-01 RX ADMIN — Medication 100 GRAM(S): at 20:47

## 2020-05-01 RX ADMIN — Medication 650 MILLIGRAM(S): at 13:59

## 2020-05-01 RX ADMIN — TRAMADOL HYDROCHLORIDE 50 MILLIGRAM(S): 50 TABLET ORAL at 09:18

## 2020-05-01 RX ADMIN — TRAMADOL HYDROCHLORIDE 50 MILLIGRAM(S): 50 TABLET ORAL at 16:35

## 2020-05-01 RX ADMIN — ENOXAPARIN SODIUM 30 MILLIGRAM(S): 100 INJECTION SUBCUTANEOUS at 05:25

## 2020-05-01 RX ADMIN — TRAMADOL HYDROCHLORIDE 50 MILLIGRAM(S): 50 TABLET ORAL at 03:48

## 2020-05-01 RX ADMIN — SODIUM CHLORIDE 150 MILLILITER(S): 9 INJECTION INTRAMUSCULAR; INTRAVENOUS; SUBCUTANEOUS at 05:33

## 2020-05-01 RX ADMIN — ONDANSETRON 4 MILLIGRAM(S): 8 TABLET, FILM COATED ORAL at 16:35

## 2020-05-01 RX ADMIN — ENOXAPARIN SODIUM 30 MILLIGRAM(S): 100 INJECTION SUBCUTANEOUS at 18:36

## 2020-05-01 NOTE — PROGRESS NOTE ADULT - SUBJECTIVE AND OBJECTIVE BOX
still has some pain left side  no N/V   afebrile   abd soft slight distention  nontender   labs improving   surgically stable supportive care advancve diet

## 2020-05-01 NOTE — PROGRESS NOTE ADULT - SUBJECTIVE AND OBJECTIVE BOX
INTERVAL HPI/OVERNIGHT EVENTS:  Patient seen and evaluated at bedside and found hemodynamically stable and in no acute distress. No acute events overnight. Pain is improving but still intermittent in LUQ and epigastrium, controlled with PO medication. Continues to be NPO, without nausea or emesis, passing gas and having bowel movements. Denies fevers, chills, chest pain, SOB, coughing, dizziness, n/v/d, or generalized malaise.       SUBJECTIVE:      MEDICATIONS  (STANDING):  acetaminophen   Tablet .. 650 milliGRAM(s) Oral every 6 hours  chlorhexidine 4% Liquid 1 Application(s) Topical <User Schedule>  enoxaparin Injectable 30 milliGRAM(s) SubCutaneous every 12 hours  losartan 25 milliGRAM(s) Oral daily  pantoprazole    Tablet 40 milliGRAM(s) Oral before breakfast  senna 2 Tablet(s) Oral at bedtime  sodium chloride 0.9%. 1000 milliLiter(s) (150 mL/Hr) IV Continuous <Continuous>    MEDICATIONS  (PRN):  hydrALAZINE Injectable 10 milliGRAM(s) IV Push every 4 hours PRN For SBP > 170  ondansetron Injectable 4 milliGRAM(s) IV Push every 6 hours PRN Nausea  traMADol 25 milliGRAM(s) Oral every 6 hours PRN Moderate Pain (4 - 6)  traMADol 50 milliGRAM(s) Oral every 6 hours PRN Severe Pain (7 - 10)      Vital Signs Last 24 Hrs  T(C): 37 (2020 19:48), Max: 37.1 (2020 08:46)  T(F): 98.6 (2020 19:48), Max: 98.8 (2020 08:46)  HR: 68 (2020 19:48) (60 - 82)  BP: 153/80 (2020 19:48) (140/82 - 156/71)  BP(mean): --  RR: 18 (2020 19:48) (18 - 18)  SpO2: 98% (2020 19:48) (97% - 98%)    PHYSICAL EXAM:    General: lying in bed in no acute distress  HEENT: Neck supple  Chest: non-labored breathing or conversational dyspnea   Abdomen: non-distended, soft and depressible, tender to palpation to epigastrium and LUQ. No guarding or rebound, non-peritonitic  Ext: no edema or cyanosis      I&O's Detail    2020 07:01  -  2020 07:00  --------------------------------------------------------  IN:    sodium chloride 0.9%: 1250 mL    sodium chloride 0.9%.: 2400 mL  Total IN: 3650 mL    OUT:    Voided: 1820 mL  Total OUT: 1820 mL    Total NET: 1830 mL      2020 07:01  -  01 May 2020 01:55  --------------------------------------------------------  IN:    sodium chloride 0.9%.: 1650 mL  Total IN: 1650 mL    OUT:    Voided: 1800 mL  Total OUT: 1800 mL    Total NET: -150 mL          LABS:                        12.6   10.80 )-----------( 183      ( 2020 07:32 )             39.1     04-30    142  |  101  |  20.0  ----------------------------<  119<H>  3.4<L>   |  26.0  |  0.91    Ca    8.6      2020 07:32  Phos  2.5     04-30  Mg     1.9     04-30    TPro  6.0<L>  /  Alb  3.2<L>  /  TBili  1.0  /  DBili  0.5<H>  /  AST  199<H>  /  ALT  387<H>  /  AlkPhos  79  04-30      Urinalysis Basic - ( 2020 06:16 )    Color: Yellow / Appearance: Clear / S.015 / pH: x  Gluc: x / Ketone: Large  / Bili: Negative / Urobili: Negative mg/dL   Blood: x / Protein: 15 mg/dL / Nitrite: Negative   Leuk Esterase: Negative / RBC: Negative /HPF / WBC 0-2   Sq Epi: x / Non Sq Epi: Negative / Bacteria: Negative

## 2020-05-01 NOTE — PROGRESS NOTE ADULT - ASSESSMENT
69 year old male with gallstone pancreatitis, MRCP with no evidence of choledocholithiasis. Conservative management, still with abdominal discomfort while NPO    -f/u LFT, downtrending  -CLD AM if improvement in PE  -pain management  -DVT ppx  -patient will need interval cholecystectomy once pancreatitis has resolved

## 2020-05-02 LAB
ALBUMIN SERPL ELPH-MCNC: 3.2 G/DL — LOW (ref 3.3–5.2)
ALP SERPL-CCNC: 81 U/L — SIGNIFICANT CHANGE UP (ref 40–120)
ALT FLD-CCNC: 183 U/L — HIGH
ANION GAP SERPL CALC-SCNC: 16 MMOL/L — SIGNIFICANT CHANGE UP (ref 5–17)
AST SERPL-CCNC: 62 U/L — HIGH
BASOPHILS # BLD AUTO: 0.02 K/UL — SIGNIFICANT CHANGE UP (ref 0–0.2)
BASOPHILS NFR BLD AUTO: 0.2 % — SIGNIFICANT CHANGE UP (ref 0–2)
BILIRUB DIRECT SERPL-MCNC: 0.5 MG/DL — HIGH (ref 0–0.3)
BILIRUB INDIRECT FLD-MCNC: 0.5 MG/DL — SIGNIFICANT CHANGE UP (ref 0.2–1)
BILIRUB SERPL-MCNC: 1 MG/DL — SIGNIFICANT CHANGE UP (ref 0.4–2)
BUN SERPL-MCNC: 13 MG/DL — SIGNIFICANT CHANGE UP (ref 8–20)
CALCIUM SERPL-MCNC: 8.7 MG/DL — SIGNIFICANT CHANGE UP (ref 8.6–10.2)
CHLORIDE SERPL-SCNC: 99 MMOL/L — SIGNIFICANT CHANGE UP (ref 98–107)
CO2 SERPL-SCNC: 24 MMOL/L — SIGNIFICANT CHANGE UP (ref 22–29)
CREAT SERPL-MCNC: 0.62 MG/DL — SIGNIFICANT CHANGE UP (ref 0.5–1.3)
EOSINOPHIL # BLD AUTO: 0.21 K/UL — SIGNIFICANT CHANGE UP (ref 0–0.5)
EOSINOPHIL NFR BLD AUTO: 1.8 % — SIGNIFICANT CHANGE UP (ref 0–6)
GLUCOSE SERPL-MCNC: 113 MG/DL — HIGH (ref 70–99)
HCT VFR BLD CALC: 39 % — SIGNIFICANT CHANGE UP (ref 39–50)
HGB BLD-MCNC: 13 G/DL — SIGNIFICANT CHANGE UP (ref 13–17)
IMM GRANULOCYTES NFR BLD AUTO: 0.9 % — SIGNIFICANT CHANGE UP (ref 0–1.5)
LYMPHOCYTES # BLD AUTO: 0.99 K/UL — LOW (ref 1–3.3)
LYMPHOCYTES # BLD AUTO: 8.6 % — LOW (ref 13–44)
MAGNESIUM SERPL-MCNC: 2.1 MG/DL — SIGNIFICANT CHANGE UP (ref 1.8–2.6)
MCHC RBC-ENTMCNC: 29.1 PG — SIGNIFICANT CHANGE UP (ref 27–34)
MCHC RBC-ENTMCNC: 33.3 GM/DL — SIGNIFICANT CHANGE UP (ref 32–36)
MCV RBC AUTO: 87.2 FL — SIGNIFICANT CHANGE UP (ref 80–100)
MONOCYTES # BLD AUTO: 1.01 K/UL — HIGH (ref 0–0.9)
MONOCYTES NFR BLD AUTO: 8.7 % — SIGNIFICANT CHANGE UP (ref 2–14)
NEUTROPHILS # BLD AUTO: 9.24 K/UL — HIGH (ref 1.8–7.4)
NEUTROPHILS NFR BLD AUTO: 79.8 % — HIGH (ref 43–77)
PHOSPHATE SERPL-MCNC: 1.8 MG/DL — LOW (ref 2.4–4.7)
PLATELET # BLD AUTO: 199 K/UL — SIGNIFICANT CHANGE UP (ref 150–400)
POTASSIUM SERPL-MCNC: 3.2 MMOL/L — LOW (ref 3.5–5.3)
POTASSIUM SERPL-SCNC: 3.2 MMOL/L — LOW (ref 3.5–5.3)
PROT SERPL-MCNC: 6.5 G/DL — LOW (ref 6.6–8.7)
RBC # BLD: 4.47 M/UL — SIGNIFICANT CHANGE UP (ref 4.2–5.8)
RBC # FLD: 14.4 % — SIGNIFICANT CHANGE UP (ref 10.3–14.5)
SODIUM SERPL-SCNC: 139 MMOL/L — SIGNIFICANT CHANGE UP (ref 135–145)
WBC # BLD: 11.57 K/UL — HIGH (ref 3.8–10.5)
WBC # FLD AUTO: 11.57 K/UL — HIGH (ref 3.8–10.5)

## 2020-05-02 PROCEDURE — 99232 SBSQ HOSP IP/OBS MODERATE 35: CPT | Mod: GC

## 2020-05-02 PROCEDURE — 74018 RADEX ABDOMEN 1 VIEW: CPT | Mod: 26

## 2020-05-02 PROCEDURE — 99232 SBSQ HOSP IP/OBS MODERATE 35: CPT

## 2020-05-02 RX ORDER — POLYETHYLENE GLYCOL 3350 17 G/17G
17 POWDER, FOR SOLUTION ORAL DAILY
Refills: 0 | Status: DISCONTINUED | OUTPATIENT
Start: 2020-05-02 | End: 2020-05-06

## 2020-05-02 RX ORDER — POLYETHYLENE GLYCOL 3350 17 G/17G
17 POWDER, FOR SOLUTION ORAL EVERY 12 HOURS
Refills: 0 | Status: DISCONTINUED | OUTPATIENT
Start: 2020-05-02 | End: 2020-05-02

## 2020-05-02 RX ORDER — POTASSIUM PHOSPHATE, MONOBASIC POTASSIUM PHOSPHATE, DIBASIC 236; 224 MG/ML; MG/ML
30 INJECTION, SOLUTION INTRAVENOUS ONCE
Refills: 0 | Status: COMPLETED | OUTPATIENT
Start: 2020-05-02 | End: 2020-05-02

## 2020-05-02 RX ORDER — SIMETHICONE 80 MG/1
80 TABLET, CHEWABLE ORAL EVERY 12 HOURS
Refills: 0 | Status: DISCONTINUED | OUTPATIENT
Start: 2020-05-02 | End: 2020-05-06

## 2020-05-02 RX ORDER — POTASSIUM CHLORIDE 20 MEQ
20 PACKET (EA) ORAL
Refills: 0 | Status: DISCONTINUED | OUTPATIENT
Start: 2020-05-02 | End: 2020-05-02

## 2020-05-02 RX ORDER — POTASSIUM CHLORIDE 20 MEQ
20 PACKET (EA) ORAL EVERY 4 HOURS
Refills: 0 | Status: COMPLETED | OUTPATIENT
Start: 2020-05-02 | End: 2020-05-02

## 2020-05-02 RX ADMIN — Medication 10 MILLIGRAM(S): at 12:07

## 2020-05-02 RX ADMIN — Medication 650 MILLIGRAM(S): at 11:59

## 2020-05-02 RX ADMIN — Medication 10 MILLIGRAM(S): at 21:39

## 2020-05-02 RX ADMIN — ENOXAPARIN SODIUM 30 MILLIGRAM(S): 100 INJECTION SUBCUTANEOUS at 17:07

## 2020-05-02 RX ADMIN — Medication 20 MILLIEQUIVALENT(S): at 15:55

## 2020-05-02 RX ADMIN — LOSARTAN POTASSIUM 25 MILLIGRAM(S): 100 TABLET, FILM COATED ORAL at 04:07

## 2020-05-02 RX ADMIN — PANTOPRAZOLE SODIUM 40 MILLIGRAM(S): 20 TABLET, DELAYED RELEASE ORAL at 04:07

## 2020-05-02 RX ADMIN — SODIUM CHLORIDE 150 MILLILITER(S): 9 INJECTION INTRAMUSCULAR; INTRAVENOUS; SUBCUTANEOUS at 14:35

## 2020-05-02 RX ADMIN — Medication 650 MILLIGRAM(S): at 04:07

## 2020-05-02 RX ADMIN — Medication 10 MILLIGRAM(S): at 17:13

## 2020-05-02 RX ADMIN — ENOXAPARIN SODIUM 30 MILLIGRAM(S): 100 INJECTION SUBCUTANEOUS at 04:06

## 2020-05-02 RX ADMIN — SENNA PLUS 2 TABLET(S): 8.6 TABLET ORAL at 21:38

## 2020-05-02 RX ADMIN — Medication 20 MILLIEQUIVALENT(S): at 17:07

## 2020-05-02 RX ADMIN — Medication 650 MILLIGRAM(S): at 17:07

## 2020-05-02 RX ADMIN — SODIUM CHLORIDE 100 MILLILITER(S): 9 INJECTION INTRAMUSCULAR; INTRAVENOUS; SUBCUTANEOUS at 22:26

## 2020-05-02 RX ADMIN — TRAMADOL HYDROCHLORIDE 50 MILLIGRAM(S): 50 TABLET ORAL at 21:38

## 2020-05-02 RX ADMIN — SIMETHICONE 80 MILLIGRAM(S): 80 TABLET, CHEWABLE ORAL at 03:49

## 2020-05-02 RX ADMIN — Medication 20 MILLIEQUIVALENT(S): at 21:37

## 2020-05-02 RX ADMIN — POLYETHYLENE GLYCOL 3350 17 GRAM(S): 17 POWDER, FOR SOLUTION ORAL at 04:07

## 2020-05-02 RX ADMIN — SODIUM CHLORIDE 100 MILLILITER(S): 9 INJECTION INTRAMUSCULAR; INTRAVENOUS; SUBCUTANEOUS at 17:43

## 2020-05-02 RX ADMIN — TRAMADOL HYDROCHLORIDE 50 MILLIGRAM(S): 50 TABLET ORAL at 04:07

## 2020-05-02 RX ADMIN — POLYETHYLENE GLYCOL 3350 17 GRAM(S): 17 POWDER, FOR SOLUTION ORAL at 11:59

## 2020-05-02 RX ADMIN — TRAMADOL HYDROCHLORIDE 50 MILLIGRAM(S): 50 TABLET ORAL at 12:02

## 2020-05-02 NOTE — PROGRESS NOTE ADULT - ASSESSMENT
69 year old male with gallstone pancreatitis, MRCP with no evidence of choledocholithiasis. Conservative management, improved abdominal pain. Tolerating CLD.    -f/u LFT, downtrending  -ADAT in am if labs continue to improve  -pain management  -DVT ppx  -patient will need interval cholecystectomy once pancreatitis has resolved

## 2020-05-02 NOTE — PROGRESS NOTE ADULT - SUBJECTIVE AND OBJECTIVE BOX
INTERVAL HPI/OVERNIGHT EVENTS: none    SUBJECTIVE:  Patient evaluated at bedside and found resting comfortably in bed. Patient with episode of mild nausea yesterday morning, now subsided. Tolerating CLD without n/v at present. Paint significantly improved but reports feeling of "fullness" at epigastric region. Denies sob, chest pain, n/v, fever/chills. Patient has not had bm since admission.    MEDICATIONS  (STANDING):  acetaminophen   Tablet .. 650 milliGRAM(s) Oral every 6 hours  chlorhexidine 4% Liquid 1 Application(s) Topical <User Schedule>  enoxaparin Injectable 30 milliGRAM(s) SubCutaneous every 12 hours  losartan 25 milliGRAM(s) Oral daily  pantoprazole    Tablet 40 milliGRAM(s) Oral before breakfast  senna 2 Tablet(s) Oral at bedtime  sodium chloride 0.9%. 1000 milliLiter(s) (150 mL/Hr) IV Continuous <Continuous>    MEDICATIONS  (PRN):  hydrALAZINE Injectable 10 milliGRAM(s) IV Push every 4 hours PRN For SBP > 170  ondansetron Injectable 4 milliGRAM(s) IV Push every 6 hours PRN Nausea  traMADol 25 milliGRAM(s) Oral every 6 hours PRN Moderate Pain (4 - 6)  traMADol 50 milliGRAM(s) Oral every 6 hours PRN Severe Pain (7 - 10)      Vital Signs Last 24 Hrs  T(C): 37.5 (01 May 2020 23:13), Max: 37.6 (01 May 2020 15:46)  T(F): 99.5 (01 May 2020 23:13), Max: 99.6 (01 May 2020 15:46)  HR: 68 (01 May 2020 23:13) (63 - 68)  BP: 167/73 (02 May 2020 00:17) (167/73 - 186/64)  BP(mean): --  RR: 18 (01 May 2020 23:13) (16 - 18)  SpO2: 95% (01 May 2020 09:17) (95% - 95%)    PE  Gen: resting comfortably in bed, nad  Pulm: no increased wob, no conversational dyspnea  Abd: mildly distended, soft, non-tender to palpation, non-peritoneal  : urinating without difficulty, no evidence of hematuria  Ext: distal extremities warm and well perfused without evidence of peripheral edema  Neuro: AOx3      I&O's Detail    2020 07:01  -  01 May 2020 07:00  --------------------------------------------------------  IN:    sodium chloride 0.9%.: 3300 mL  Total IN: 3300 mL    OUT:    Voided: 2400 mL  Total OUT: 2400 mL    Total NET: 900 mL      01 May 2020 07:01  -  02 May 2020 01:40  --------------------------------------------------------  IN:  Total IN: 0 mL    OUT:    Voided: 300 mL  Total OUT: 300 mL    Total NET: -300 mL          LABS:                        12.8   12.83 )-----------( 192      ( 01 May 2020 07:42 )             38.8     05-01    140  |  102  |  15.0  ----------------------------<  119<H>  3.7   |  22.0  |  0.70    Ca    7.8<L>      01 May 2020 07:42  Phos  2.4     05-  Mg     1.9         TPro  6.3<L>  /  Alb  3.4  /  TBili  1.2  /  DBili  0.5<H>  /  AST  101<H>  /  ALT  258<H>  /  AlkPhos  80  05-01      Urinalysis Basic - ( 2020 06:16 )    Color: Yellow / Appearance: Clear / S.015 / pH: x  Gluc: x / Ketone: Large  / Bili: Negative / Urobili: Negative mg/dL   Blood: x / Protein: 15 mg/dL / Nitrite: Negative   Leuk Esterase: Negative / RBC: Negative /HPF / WBC 0-2   Sq Epi: x / Non Sq Epi: Negative / Bacteria: Negative

## 2020-05-03 LAB
ANION GAP SERPL CALC-SCNC: 14 MMOL/L — SIGNIFICANT CHANGE UP (ref 5–17)
BASOPHILS # BLD AUTO: 0.03 K/UL — SIGNIFICANT CHANGE UP (ref 0–0.2)
BASOPHILS NFR BLD AUTO: 0.2 % — SIGNIFICANT CHANGE UP (ref 0–2)
BUN SERPL-MCNC: 15 MG/DL — SIGNIFICANT CHANGE UP (ref 8–20)
CALCIUM SERPL-MCNC: 8.8 MG/DL — SIGNIFICANT CHANGE UP (ref 8.6–10.2)
CHLORIDE SERPL-SCNC: 98 MMOL/L — SIGNIFICANT CHANGE UP (ref 98–107)
CO2 SERPL-SCNC: 25 MMOL/L — SIGNIFICANT CHANGE UP (ref 22–29)
CREAT SERPL-MCNC: 0.65 MG/DL — SIGNIFICANT CHANGE UP (ref 0.5–1.3)
EOSINOPHIL # BLD AUTO: 0.27 K/UL — SIGNIFICANT CHANGE UP (ref 0–0.5)
EOSINOPHIL NFR BLD AUTO: 2.2 % — SIGNIFICANT CHANGE UP (ref 0–6)
GLUCOSE SERPL-MCNC: 118 MG/DL — HIGH (ref 70–99)
HCT VFR BLD CALC: 37.8 % — LOW (ref 39–50)
HGB BLD-MCNC: 12.4 G/DL — LOW (ref 13–17)
IMM GRANULOCYTES NFR BLD AUTO: 0.6 % — SIGNIFICANT CHANGE UP (ref 0–1.5)
LYMPHOCYTES # BLD AUTO: 1.21 K/UL — SIGNIFICANT CHANGE UP (ref 1–3.3)
LYMPHOCYTES # BLD AUTO: 9.8 % — LOW (ref 13–44)
MAGNESIUM SERPL-MCNC: 2.1 MG/DL — SIGNIFICANT CHANGE UP (ref 1.6–2.6)
MCHC RBC-ENTMCNC: 28.8 PG — SIGNIFICANT CHANGE UP (ref 27–34)
MCHC RBC-ENTMCNC: 32.8 GM/DL — SIGNIFICANT CHANGE UP (ref 32–36)
MCV RBC AUTO: 87.7 FL — SIGNIFICANT CHANGE UP (ref 80–100)
MONOCYTES # BLD AUTO: 0.98 K/UL — HIGH (ref 0–0.9)
MONOCYTES NFR BLD AUTO: 7.9 % — SIGNIFICANT CHANGE UP (ref 2–14)
NEUTROPHILS # BLD AUTO: 9.8 K/UL — HIGH (ref 1.8–7.4)
NEUTROPHILS NFR BLD AUTO: 79.3 % — HIGH (ref 43–77)
PHOSPHATE SERPL-MCNC: 2.3 MG/DL — LOW (ref 2.4–4.7)
PLATELET # BLD AUTO: 217 K/UL — SIGNIFICANT CHANGE UP (ref 150–400)
POTASSIUM SERPL-MCNC: 3.4 MMOL/L — LOW (ref 3.5–5.3)
POTASSIUM SERPL-SCNC: 3.4 MMOL/L — LOW (ref 3.5–5.3)
RBC # BLD: 4.31 M/UL — SIGNIFICANT CHANGE UP (ref 4.2–5.8)
RBC # FLD: 14.6 % — HIGH (ref 10.3–14.5)
SODIUM SERPL-SCNC: 137 MMOL/L — SIGNIFICANT CHANGE UP (ref 135–145)
WBC # BLD: 12.37 K/UL — HIGH (ref 3.8–10.5)
WBC # FLD AUTO: 12.37 K/UL — HIGH (ref 3.8–10.5)

## 2020-05-03 PROCEDURE — 99232 SBSQ HOSP IP/OBS MODERATE 35: CPT

## 2020-05-03 PROCEDURE — 74177 CT ABD & PELVIS W/CONTRAST: CPT | Mod: 26

## 2020-05-03 RX ORDER — POTASSIUM CHLORIDE 20 MEQ
20 PACKET (EA) ORAL ONCE
Refills: 0 | Status: COMPLETED | OUTPATIENT
Start: 2020-05-03 | End: 2020-05-03

## 2020-05-03 RX ORDER — SODIUM CHLORIDE 9 MG/ML
1000 INJECTION, SOLUTION INTRAVENOUS
Refills: 0 | Status: DISCONTINUED | OUTPATIENT
Start: 2020-05-03 | End: 2020-05-05

## 2020-05-03 RX ORDER — SODIUM CHLORIDE 9 MG/ML
500 INJECTION, SOLUTION INTRAVENOUS ONCE
Refills: 0 | Status: COMPLETED | OUTPATIENT
Start: 2020-05-03 | End: 2020-05-03

## 2020-05-03 RX ORDER — TRAMADOL HYDROCHLORIDE 50 MG/1
25 TABLET ORAL EVERY 4 HOURS
Refills: 0 | Status: DISCONTINUED | OUTPATIENT
Start: 2020-05-03 | End: 2020-05-06

## 2020-05-03 RX ORDER — POTASSIUM PHOSPHATE, MONOBASIC POTASSIUM PHOSPHATE, DIBASIC 236; 224 MG/ML; MG/ML
30 INJECTION, SOLUTION INTRAVENOUS ONCE
Refills: 0 | Status: COMPLETED | OUTPATIENT
Start: 2020-05-03 | End: 2020-05-03

## 2020-05-03 RX ORDER — TRAMADOL HYDROCHLORIDE 50 MG/1
50 TABLET ORAL EVERY 4 HOURS
Refills: 0 | Status: DISCONTINUED | OUTPATIENT
Start: 2020-05-03 | End: 2020-05-06

## 2020-05-03 RX ORDER — MULTIVIT WITH MIN/MFOLATE/K2 340-15/3 G
1 POWDER (GRAM) ORAL ONCE
Refills: 0 | Status: COMPLETED | OUTPATIENT
Start: 2020-05-03 | End: 2020-05-03

## 2020-05-03 RX ADMIN — Medication 650 MILLIGRAM(S): at 11:14

## 2020-05-03 RX ADMIN — TRAMADOL HYDROCHLORIDE 25 MILLIGRAM(S): 50 TABLET ORAL at 21:17

## 2020-05-03 RX ADMIN — POTASSIUM PHOSPHATE, MONOBASIC POTASSIUM PHOSPHATE, DIBASIC 83.33 MILLIMOLE(S): 236; 224 INJECTION, SOLUTION INTRAVENOUS at 11:15

## 2020-05-03 RX ADMIN — LOSARTAN POTASSIUM 25 MILLIGRAM(S): 100 TABLET, FILM COATED ORAL at 05:23

## 2020-05-03 RX ADMIN — SODIUM CHLORIDE 100 MILLILITER(S): 9 INJECTION, SOLUTION INTRAVENOUS at 11:15

## 2020-05-03 RX ADMIN — SODIUM CHLORIDE 100 MILLILITER(S): 9 INJECTION INTRAMUSCULAR; INTRAVENOUS; SUBCUTANEOUS at 05:35

## 2020-05-03 RX ADMIN — TRAMADOL HYDROCHLORIDE 50 MILLIGRAM(S): 50 TABLET ORAL at 17:31

## 2020-05-03 RX ADMIN — Medication 650 MILLIGRAM(S): at 17:31

## 2020-05-03 RX ADMIN — Medication 20 MILLIEQUIVALENT(S): at 11:10

## 2020-05-03 RX ADMIN — Medication 650 MILLIGRAM(S): at 07:17

## 2020-05-03 RX ADMIN — PANTOPRAZOLE SODIUM 40 MILLIGRAM(S): 20 TABLET, DELAYED RELEASE ORAL at 05:23

## 2020-05-03 RX ADMIN — Medication 650 MILLIGRAM(S): at 01:43

## 2020-05-03 RX ADMIN — TRAMADOL HYDROCHLORIDE 25 MILLIGRAM(S): 50 TABLET ORAL at 01:38

## 2020-05-03 RX ADMIN — POLYETHYLENE GLYCOL 3350 17 GRAM(S): 17 POWDER, FOR SOLUTION ORAL at 11:10

## 2020-05-03 RX ADMIN — TRAMADOL HYDROCHLORIDE 50 MILLIGRAM(S): 50 TABLET ORAL at 05:22

## 2020-05-03 RX ADMIN — ENOXAPARIN SODIUM 30 MILLIGRAM(S): 100 INJECTION SUBCUTANEOUS at 17:31

## 2020-05-03 RX ADMIN — Medication 1 BOTTLE: at 11:14

## 2020-05-03 RX ADMIN — SODIUM CHLORIDE 1000 MILLILITER(S): 9 INJECTION, SOLUTION INTRAVENOUS at 16:32

## 2020-05-03 RX ADMIN — Medication 650 MILLIGRAM(S): at 22:54

## 2020-05-03 RX ADMIN — ONDANSETRON 4 MILLIGRAM(S): 8 TABLET, FILM COATED ORAL at 14:34

## 2020-05-03 RX ADMIN — ENOXAPARIN SODIUM 30 MILLIGRAM(S): 100 INJECTION SUBCUTANEOUS at 05:23

## 2020-05-03 NOTE — PROGRESS NOTE ADULT - SUBJECTIVE AND OBJECTIVE BOX
INTERVAL HPI/OVERNIGHT EVENTS: no issues overnight    SUBJECTIVE: reports feeling constipated, although tolerating clear liquids w/o N/V    MEDICATIONS  (STANDING):  acetaminophen   Tablet .. 650 milliGRAM(s) Oral every 6 hours  chlorhexidine 4% Liquid 1 Application(s) Topical <User Schedule>  enoxaparin Injectable 30 milliGRAM(s) SubCutaneous every 12 hours  losartan 25 milliGRAM(s) Oral daily  pantoprazole    Tablet 40 milliGRAM(s) Oral before breakfast  polyethylene glycol 3350 17 Gram(s) Oral daily  senna 2 Tablet(s) Oral at bedtime  sodium chloride 0.9%. 1000 milliLiter(s) (100 mL/Hr) IV Continuous <Continuous>    MEDICATIONS  (PRN):  hydrALAZINE Injectable 10 milliGRAM(s) IV Push every 4 hours PRN For SBP > 170  ondansetron Injectable 4 milliGRAM(s) IV Push every 6 hours PRN Nausea  simethicone 80 milliGRAM(s) Chew every 12 hours PRN Gas  traMADol 25 milliGRAM(s) Oral every 6 hours PRN Moderate Pain (4 - 6)  traMADol 50 milliGRAM(s) Oral every 6 hours PRN Severe Pain (7 - 10)      Vital Signs Last 24 Hrs  T(C): 37.2 (02 May 2020 21:34), Max: 37.8 (02 May 2020 04:04)  T(F): 98.9 (02 May 2020 21:34), Max: 100.1 (02 May 2020 04:04)  HR: 60 (02 May 2020 21:34) (60 - 69)  BP: 185/73 (02 May 2020 21:34) (159/95 - 190/95)  BP(mean): --  RR: 18 (02 May 2020 21:34) (18 - 18)  SpO2: 98% (02 May 2020 21:34) (93% - 98%)    PE  Gen: NAD  Pulm: CTA  CV: RRR  Abd: soft, distended with mild left abdominal tenderness, no rebound, guarding or rigidity    I&O's Detail    01 May 2020 07:01  -  02 May 2020 07:00  --------------------------------------------------------  IN:    sodium chloride 0.9%.: 1500 mL  Total IN: 1500 mL    OUT:    Voided: 700 mL  Total OUT: 700 mL    Total NET: 800 mL    LABS:                        13.0   11.57 )-----------( 199      ( 02 May 2020 12:01 )             39.0     05-02    139  |  99  |  13.0  ----------------------------<  113<H>  3.2<L>   |  24.0  |  0.62    Ca    8.7      02 May 2020 12:01  Phos  1.8     05-02  Mg     2.1     05-02    TPro  6.5<L>  /  Alb  3.2<L>  /  TBili  1.0  /  DBili  0.5<H>  /  AST  62<H>  /  ALT  183<H>  /  AlkPhos  81  05-02

## 2020-05-03 NOTE — PROGRESS NOTE ADULT - ASSESSMENT
69 year old male with gallstone pancreatitis, MRCP with no evidence of choledocholithiasis. Clinically stable, afebrile. Tolerating CLD. KUB obtained for abdominal distention, which on my evaluation demonstrates colonic distention.   -f/u AM labs, improving leukocytosis  -monitor return of bowel function, continue bowel regimen; ADAT  -pain management  -DVT ppx

## 2020-05-04 LAB
ALBUMIN SERPL ELPH-MCNC: 3.1 G/DL — LOW (ref 3.3–5.2)
ALP SERPL-CCNC: 75 U/L — SIGNIFICANT CHANGE UP (ref 40–120)
ALT FLD-CCNC: 103 U/L — HIGH
ANION GAP SERPL CALC-SCNC: 13 MMOL/L — SIGNIFICANT CHANGE UP (ref 5–17)
AST SERPL-CCNC: 31 U/L — SIGNIFICANT CHANGE UP
BASOPHILS # BLD AUTO: 0.03 K/UL — SIGNIFICANT CHANGE UP (ref 0–0.2)
BASOPHILS NFR BLD AUTO: 0.3 % — SIGNIFICANT CHANGE UP (ref 0–2)
BILIRUB DIRECT SERPL-MCNC: 0.2 MG/DL — SIGNIFICANT CHANGE UP (ref 0–0.3)
BILIRUB INDIRECT FLD-MCNC: 0.4 MG/DL — SIGNIFICANT CHANGE UP (ref 0.2–1)
BILIRUB SERPL-MCNC: 0.6 MG/DL — SIGNIFICANT CHANGE UP (ref 0.4–2)
BUN SERPL-MCNC: 14 MG/DL — SIGNIFICANT CHANGE UP (ref 8–20)
CALCIUM SERPL-MCNC: 8.6 MG/DL — SIGNIFICANT CHANGE UP (ref 8.6–10.2)
CHLORIDE SERPL-SCNC: 100 MMOL/L — SIGNIFICANT CHANGE UP (ref 98–107)
CO2 SERPL-SCNC: 25 MMOL/L — SIGNIFICANT CHANGE UP (ref 22–29)
CREAT SERPL-MCNC: 0.6 MG/DL — SIGNIFICANT CHANGE UP (ref 0.5–1.3)
EOSINOPHIL # BLD AUTO: 0.35 K/UL — SIGNIFICANT CHANGE UP (ref 0–0.5)
EOSINOPHIL NFR BLD AUTO: 3 % — SIGNIFICANT CHANGE UP (ref 0–6)
GLUCOSE SERPL-MCNC: 133 MG/DL — HIGH (ref 70–99)
HCT VFR BLD CALC: 34.3 % — LOW (ref 39–50)
HGB BLD-MCNC: 11.5 G/DL — LOW (ref 13–17)
IMM GRANULOCYTES NFR BLD AUTO: 0.7 % — SIGNIFICANT CHANGE UP (ref 0–1.5)
LYMPHOCYTES # BLD AUTO: 0.92 K/UL — LOW (ref 1–3.3)
LYMPHOCYTES # BLD AUTO: 7.8 % — LOW (ref 13–44)
MAGNESIUM SERPL-MCNC: 2.1 MG/DL — SIGNIFICANT CHANGE UP (ref 1.6–2.6)
MCHC RBC-ENTMCNC: 29 PG — SIGNIFICANT CHANGE UP (ref 27–34)
MCHC RBC-ENTMCNC: 33.5 GM/DL — SIGNIFICANT CHANGE UP (ref 32–36)
MCV RBC AUTO: 86.6 FL — SIGNIFICANT CHANGE UP (ref 80–100)
MONOCYTES # BLD AUTO: 0.99 K/UL — HIGH (ref 0–0.9)
MONOCYTES NFR BLD AUTO: 8.4 % — SIGNIFICANT CHANGE UP (ref 2–14)
NEUTROPHILS # BLD AUTO: 9.38 K/UL — HIGH (ref 1.8–7.4)
NEUTROPHILS NFR BLD AUTO: 79.8 % — HIGH (ref 43–77)
PHOSPHATE SERPL-MCNC: 2.2 MG/DL — LOW (ref 2.4–4.7)
PLATELET # BLD AUTO: 224 K/UL — SIGNIFICANT CHANGE UP (ref 150–400)
POTASSIUM SERPL-MCNC: 3.2 MMOL/L — LOW (ref 3.5–5.3)
POTASSIUM SERPL-SCNC: 3.2 MMOL/L — LOW (ref 3.5–5.3)
PROT SERPL-MCNC: 6.1 G/DL — LOW (ref 6.6–8.7)
RBC # BLD: 3.96 M/UL — LOW (ref 4.2–5.8)
RBC # FLD: 14.6 % — HIGH (ref 10.3–14.5)
SODIUM SERPL-SCNC: 138 MMOL/L — SIGNIFICANT CHANGE UP (ref 135–145)
WBC # BLD: 11.75 K/UL — HIGH (ref 3.8–10.5)
WBC # FLD AUTO: 11.75 K/UL — HIGH (ref 3.8–10.5)

## 2020-05-04 PROCEDURE — 99222 1ST HOSP IP/OBS MODERATE 55: CPT

## 2020-05-04 RX ORDER — POTASSIUM CHLORIDE 20 MEQ
40 PACKET (EA) ORAL EVERY 4 HOURS
Refills: 0 | Status: COMPLETED | OUTPATIENT
Start: 2020-05-04 | End: 2020-05-04

## 2020-05-04 RX ADMIN — TRAMADOL HYDROCHLORIDE 50 MILLIGRAM(S): 50 TABLET ORAL at 02:56

## 2020-05-04 RX ADMIN — Medication 40 MILLIEQUIVALENT(S): at 16:01

## 2020-05-04 RX ADMIN — Medication 40 MILLIEQUIVALENT(S): at 11:17

## 2020-05-04 RX ADMIN — Medication 85 MILLIMOLE(S): at 12:43

## 2020-05-04 RX ADMIN — ENOXAPARIN SODIUM 30 MILLIGRAM(S): 100 INJECTION SUBCUTANEOUS at 16:02

## 2020-05-04 RX ADMIN — Medication 650 MILLIGRAM(S): at 23:47

## 2020-05-04 RX ADMIN — TRAMADOL HYDROCHLORIDE 50 MILLIGRAM(S): 50 TABLET ORAL at 08:58

## 2020-05-04 RX ADMIN — TRAMADOL HYDROCHLORIDE 50 MILLIGRAM(S): 50 TABLET ORAL at 21:01

## 2020-05-04 RX ADMIN — PANTOPRAZOLE SODIUM 40 MILLIGRAM(S): 20 TABLET, DELAYED RELEASE ORAL at 04:39

## 2020-05-04 RX ADMIN — SODIUM CHLORIDE 100 MILLILITER(S): 9 INJECTION, SOLUTION INTRAVENOUS at 00:31

## 2020-05-04 RX ADMIN — CHLORHEXIDINE GLUCONATE 1 APPLICATION(S): 213 SOLUTION TOPICAL at 06:11

## 2020-05-04 RX ADMIN — TRAMADOL HYDROCHLORIDE 50 MILLIGRAM(S): 50 TABLET ORAL at 16:01

## 2020-05-04 RX ADMIN — ENOXAPARIN SODIUM 30 MILLIGRAM(S): 100 INJECTION SUBCUTANEOUS at 04:40

## 2020-05-04 RX ADMIN — LOSARTAN POTASSIUM 25 MILLIGRAM(S): 100 TABLET, FILM COATED ORAL at 04:39

## 2020-05-04 RX ADMIN — Medication 650 MILLIGRAM(S): at 04:39

## 2020-05-04 RX ADMIN — Medication 650 MILLIGRAM(S): at 16:02

## 2020-05-04 RX ADMIN — Medication 650 MILLIGRAM(S): at 11:18

## 2020-05-04 RX ADMIN — SODIUM CHLORIDE 50 MILLILITER(S): 9 INJECTION, SOLUTION INTRAVENOUS at 23:48

## 2020-05-04 NOTE — CONSULT NOTE ADULT - SUBJECTIVE AND OBJECTIVE BOX
Called to see patient for incidental finding on radiographic studies for bilateral para-pelvic and right upper kidney renal cysts.  70 y/o well developed, and nourished [place] [facility] [place]     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

## 2020-05-04 NOTE — DIETITIAN INITIAL EVALUATION ADULT. - PERTINENT LABORATORY DATA
05-04 Na138 mmol/L Glu 133 mg/dL<H> K+ 3.2 mmol/L<L> Cr  0.60 mg/dL BUN 14.0 mg/dL Phos 2.2 mg/dL<L> Alb 3.1 g/dL<L> PAB n/a

## 2020-05-04 NOTE — CHART NOTE - NSCHARTNOTEFT_GEN_A_CORE
Upon Nutritional Assessment by the Registered Dietitian your patient was determined to meet criteria / has evidence of the following diagnosis/diagnoses:          [ ]  Mild Protein Calorie Malnutrition        [x]  Moderate Protein Calorie Malnutrition        [ ] Severe Protein Calorie Malnutrition        [ ] Unspecified Protein Calorie Malnutrition        [ ] Underweight / BMI <19        [ ] Morbid Obesity / BMI > 40      Findings as based on:  •  Comprehensive nutrition assessment and consultation  •  Calorie counts (nutrient intake analysis)  •  Food acceptance and intake status from observations by staff  •  Follow up  •  Patient education  •  Intervention secondary to interdisciplinary rounds  •   concerns      Treatment:    The following diet has been recommended:  1) When medically feasible, advance diet as tolerated to low fat/residue   2) Continue Ensure Enlive BID   3) Rx MVI daily   4) Consider Metamucil 1 pkt BID to add bulk to stool     PROVIDER Section:     By signing this assessment you are acknowledging and agree with the diagnosis/diagnoses assigned by the Registered Dietitian    Comments:

## 2020-05-04 NOTE — CONSULT NOTE ADULT - SUBJECTIVE AND OBJECTIVE BOX
PULMONARY CONSULT NOTE  History of Present Illness:  History of Present Illness: 	  69M with PMH significant for BPH, Bigeminy, HTN, Lumbar disc disease, Bigeminy [place] [facility] [place] found to have gallstone pancreatitis. Patient states that he had severe LUQ sharp pain associated with nausea and emesis. Patient states that 2 years ago he had a similar presentation while [place] [facility] [place] worked up with MRCP but subsequently discharged as symptoms resolved.     ROYAL AYSHAKAVIN-[id number]    Patient is a 69y old  Male who presents with a chief complaint of biliary pancreatitis (04 May 2020 11:39)  HIstory as per above.  Had significant abdominal pain which has improved.  Reports that he had pain with deep inspiration earlier on.  Asked to see for incidental finding of bilateral pleural effusions.  Patient denies shortness of breath nor chest pain  Not requiring O2.     INTERVAL HPI/OVERNIGHT EVENTS:    MEDICATIONS  (STANDING):  acetaminophen   Tablet .. 650 milliGRAM(s) Oral every 6 hours  chlorhexidine 4% Liquid 1 Application(s) Topical <User Schedule>  dextrose 5% + sodium chloride 0.45% 1000 milliLiter(s) (100 mL/Hr) IV Continuous <Continuous>  enoxaparin Injectable 30 milliGRAM(s) SubCutaneous every 12 hours  losartan 25 milliGRAM(s) Oral daily  pantoprazole    Tablet 40 milliGRAM(s) Oral before breakfast  polyethylene glycol 3350 17 Gram(s) Oral daily  potassium chloride    Tablet ER 40 milliEquivalent(s) Oral every 4 hours  senna 2 Tablet(s) Oral at bedtime      MEDICATIONS  (PRN):  hydrALAZINE Injectable 10 milliGRAM(s) IV Push every 4 hours PRN For SBP > 170  ondansetron Injectable 4 milliGRAM(s) IV Push every 6 hours PRN Nausea  simethicone 80 milliGRAM(s) Chew every 12 hours PRN Gas  traMADol 25 milliGRAM(s) Oral every 4 hours PRN Moderate Pain (4 - 6)  traMADol 50 milliGRAM(s) Oral every 4 hours PRN Severe Pain (7 - 10)      Allergies    No Known Allergies    Intolerances        PAST MEDICAL & SURGICAL HISTORY:  Lumbar disc disease  Hard of hearing: Exostosis left ear  in May 2015  BPH (benign prostatic hypertrophy)  Exostosis: left ear canal  Bigeminy  Hypertension  Lumbar disc disease: lumbar disc surgery 15 years ago  Hemorrhoid: 2012  Hemorrhoidectomy      FAMILY HISTORY:  Family history of cancer      SOCIAL HISTORY  Smoking History: Nonsmoker    REVIEW OF SYSTEMS:    CONSTITUTIONAL:  As per HPI.    HEENT:  Eyes:  No diplopia or blurred vision. ENT:  No earache, sore throat or runny nose.    CARDIOVASCULAR:  No pressure, squeezing, tightness, heaviness or aching about the chest; no palpitations.    RESPIRATORY: Negative    GASTROINTESTINAL:  No nausea, vomiting or diarrhea. + abdominal pain    GENITOURINARY:  No dysuria, frequency or urgency.    MUSCULOSKELETAL:  No joint pains    SKIN:  No new lesions.    NEUROLOGIC:  No paresthesias, fasciculations, seizures or weakness.    PSYCHIATRIC:  No disorder of thought or mood.    ENDOCRINE:  No heat or cold intolerance, polyuria or polydipsia.    HEMATOLOGICAL:  No easy bruising or bleeding.     Vital Signs Last 24 Hrs  T(C): 36.8 (04 May 2020 07:57), Max: 37.4 (03 May 2020 23:15)  T(F): 98.3 (04 May 2020 07:57), Max: 99.3 (03 May 2020 23:15)  HR: 62 (04 May 2020 07:57) (62 - 78)  BP: 155/81 (04 May 2020 07:57) (155/81 - 162/88)  BP(mean): --  RR: 18 (04 May 2020 07:57) (18 - 18)  SpO2: 95% (04 May 2020 07:57) (93% - 95%)    PHYSICAL EXAMINATION:    GENERAL: The patient is a well-developed, well-nourished _____in no apparent distress.     HEENT: Head is normocephalic and atraumatic. Extraocular muscles are intact. Mucous membranes are moist.     NECK: Supple.     LUNGS: Clear to auscultation without wheezing, rales, or rhonchi. Respirations unlabored    HEART: Regular rate and rhythm without murmur.    ABDOMEN: Soft, nontender, and nondistended.  No hepatosplenomegaly is noted.    EXTREMITIES: Without any cyanosis, clubbing, rash, lesions or edema.    NEUROLOGIC: Grossly intact.    SKIN: No ulceration or induration present.      LABS:                        11.5   11.75 )-----------( 224      ( 04 May 2020 05:50 )             34.3     05-04    138  |  100  |  14.0  ----------------------------<  133<H>  3.2<L>   |  25.0  |  0.60    Ca    8.6      04 May 2020 05:50  Phos  2.2     05-04  Mg     2.1     05-04    TPro  6.1<L>  /  Alb  3.1<L>  /  TBili  0.6  /  DBili  0.2  /  AST  31  /  ALT  103<H>  /  AlkPhos  75  05-04                        MICROBIOLOGY:    RADIOLOGY & ADDITIONAL STUDIES:< from: CT Abdomen and Pelvis w/ IV Cont (05.03.20 @ 21:47) >   EXAM:  CT ABDOMEN AND PELVIS IC                          PROCEDURE DATE:  05/03/2020          INTERPRETATION:  CT ABDOMEN AND PELVIS WITH CONTRAST    INDICATION: Pancreatitis. Evaluate for pseudocyst or necrosis.    TECHNIQUE: Contrast enhanced CTof the abdomen and pelvis. Images are reformatted in the sagittal and coronal planes.    94 mL of Omnipaque 300 contrast material was injected IV.    COMPARISON: MRI Abdomen 4/29/2020. CT abdomen pelvis 3/2/2012.    FINDINGS:    Lower Thorax: Small bilateral pleural effusion with adjacent bibasilar opacities. Correlate clinically to assess for superimposed developing infection or aspiration. No significant cardiomegaly. Coronary artery calcification and/or stenting.    Liver: No suspicious lesions.  Biliary: No significant dilatation. Cholelithiasis. No significant gallbladder wall thickening or pericholecystic edema.  Spleen: No suspicious lesions.  Pancreas: Edematous pancreas with diffuse peripancreatic stranding and mild peripancreatic fluid, compatible with acute pancreatitis. There is a focal area of low-attenuation or decreased enhancement measuring 3 x 2 cm at body/tail the pancreas image 45 of series 3. The findings are nonspecific, this may represent focal pancreatic necrosis. Possibility of infected collection such as phlegmon or developing abscess considered in the differential though no rim enhancement identified. Incompletely characterized subcentimeter hypodensity in the body of pancreas, characterized as cyst on recentMRI.   Adrenals: Normal.  Kidneys: No hydronephrosis. Stable bilateral parapelvic as well as renal cysts. Punctate nonobstructive right upper pole renal calculus.  Vessels: Atherosclerotic disease of the aorta and its branches.     GI tract: No evidence of small bowel obstruction. No significant bowel wall thickening or inflammatory changes. Mild generalized colonic distention containing contrast. Normal appendix.    Peritoneum/retroperitoneum and mesentery: No free air. See above under pancreas section for additional details. Prominent peripancreatic lymph nodes, largest measuring    Pelvic organs/Bladder: Heterogeneous prominent prostate measuring 6.7 cm, cause mass effect and protrusion at the level of the bladder base. Consider correlation with PSA and further prostatic workup to exclude underlying malignancy. Bladder wall thickening, difficult to assess secondary to inadequate distention. Consider cystoscopic evaluation to exclude underlying lesion.    Abdominal wall: A small fat containing umbilical hernia is noted. Mild anasarca.  Bones and soft tissues: Multilevel degenerative changes of the spine are noted.    IMPRESSION:    Edematous pancreas with diffuse peripancreatic stranding and mild peripancreatic fluid, compatible with acute pancreatitis.  Focal area of low-attenuation or decreased enhancement measuring 3 x 2 cm at body/tail the pancreas. The findings are nonspecific, this may represent focal pancreatic necrosis. Possibility of infected collection such as phlegmonor developing abscess considered in the differential though no rim enhancement identified. Incompletely characterized subcentimeter hypodensity in the body of pancreas, characterized as cyst on recent MRI.     Heterogeneous prominent prostate measuring 6.7 cm, cause mass effect and protrusion at the level of the bladder base. Consider correlation with PSA and further prostatic workup to exclude underlying malignancy. Bladder wall thickening, difficult to assess secondary to inadequate distention.Consider cystoscopic evaluation to exclude underlying lesion.    Additional findings as mentioned above.    These results were discussed via telephone at 5/4/2020 1:32 AM by Dr. Leonard of radiology with Dr. Contreras, institution read-back verification policy was followed.                   SALEEM LEONARD M.D., ATTENDING RADIOLOGIST    < end of copied text >

## 2020-05-04 NOTE — DIETITIAN INITIAL EVALUATION ADULT. - ETIOLOGY
related to inability to consume sufficient protein-energy needs 2/2 gallstone pancreatitis, abdominal pain

## 2020-05-04 NOTE — CONSULT NOTE ADULT - ASSESSMENT
afebrile , VSS , awake and alert resting in bed no acute distress, generally feeling better.    Tolerating clear liquids PO  chest: good air exchange, denies SOB general clear BS, some decreased bilateral bases with course sounds.     Abdomen:  mild general distention with mild discomfort in the mid/ mid-left abdominal region.  No guarding or rebound tenderness appreciated.   +bs  Back:  no CVA pain or tenderness on palpation  : voiding without difficulty, denies , pain, burning, urgency- known BPH  ( follows with outside urologist with routine PSA levels.   rectal: requested not to have rectal at this time ( states follows routinely with urologist on outside for BPH and for PSA levels )     extremities warm to toes without calf pain or tenderness.    CT scan: No hydronephrosis, stable bilateral parapelvic as well as renal cysts.  Punctate nonobstructive right upper pole renal calculus;  Pelvic organs/ bladder: heterogenous prominent prostate measuring 6.7 cm, cause mass effect and protrusion at the level of the bladder    (see written report for full results.     urinalysis: Ketones-----large                 protein------15                 else unremarkable    Wbc 11.7 down from 12.3  t     Impression :  resolving biliary pancreatitis continue care and management as per surgery.   Known BPH followed closely on outside with his urologist with PSA  and incidental finding of stable renal cysts and upper right pole calculi ( nonobstructive discussed present situation with patient )   Discussed present situation and recommendations with Urologist .    Plan:  Continue present care and management with definitive care as per Surgical Team.  Discussed with Urologist renal radiographic findings and patient findings; asymptomatic of any renal pain or discomfort, no surgical intervention at present unless symptoms changes ( CVA -renal pain or discomfort, hydronephrosis ) then would consider definitive care od cysts and or stone as briefly discussed with patient and he understands.  Will continue to follow peripherally while here and see again as necessary  Thank you for this consultation. 169

## 2020-05-04 NOTE — DIETITIAN INITIAL EVALUATION ADULT. - OTHER INFO
68yo male transferred from [facility] [place] after being found with gallstone pancreatitis, abdominal distention/pain, PMH significant for BPH, Bigeminy, HTN, Lumbar disc disease. Aware Pt tolerating CLD, 70yo male transferred from [facility] after being found with gallstone pancreatitis, abdominal distention/pain, PMH significant for BPH, Bigeminy, HTN, Lumbar disc disease. Aware Pt tolerating CLD, abdominal pain improving but still c/o poor PO intake. Diarrhea noted.

## 2020-05-04 NOTE — PROGRESS NOTE ADULT - ASSESSMENT
69 year old male with gallstone pancreatitis, MRCP with no evidence of choledocholithiasis. Clinically stable, afebrile. Tolerating CLD. KUB obtained for abdominal distention, which on my evaluation demonstrates colonic distention.   -f/u AM labs, improving leukocytosis  -Continue bowel regimen; ADAT. Right now on CLD.   -pain management  -DVT ppx

## 2020-05-04 NOTE — CONSULT NOTE ADULT - ASSESSMENT
Small bilateral effusions likely secondary to pancreatitis with sympathetic effusions.  Pulmonary density at bases most consistent with atelectasis and is consistent with patient's complaints re: abdominal pain.      Plan:  1.Mobilize patient  2.No indication for invasive evaluation of effusions  3.Incentive spirometry  4.As long as patient continues to improve without respiratory symptoms, advise followup CT of chest noncontrast in about 4 weeks.  Onset of symptoms of shortness of breath,chest pain would require earlier evaluation.

## 2020-05-04 NOTE — DIETITIAN INITIAL EVALUATION ADULT. - ADD RECOMMEND
Advance diet as tolerated to low fat/residue continue with Ensure Enlive BID; Rx MVI and vit C 500mg daily; Consider Metamucil 1 pkt BID to add bulk to stool Advance diet as tolerated to low fat/residue continue with Ensure Enlive BID; Rx MVI daily; Consider Metamucil 1 pkt BID to add bulk to stool

## 2020-05-04 NOTE — PROGRESS NOTE ADULT - SUBJECTIVE AND OBJECTIVE BOX
HPI/OVERNIGHT EVENTS:  RCOIO, denies n/v/f/c chest pain or SOB. Tolerating diet.     MEDICATIONS  (STANDING):  acetaminophen   Tablet .. 650 milliGRAM(s) Oral every 6 hours  chlorhexidine 4% Liquid 1 Application(s) Topical <User Schedule>  dextrose 5% + sodium chloride 0.45% 1000 milliLiter(s) (100 mL/Hr) IV Continuous <Continuous>  enoxaparin Injectable 30 milliGRAM(s) SubCutaneous every 12 hours  losartan 25 milliGRAM(s) Oral daily  pantoprazole    Tablet 40 milliGRAM(s) Oral before breakfast  polyethylene glycol 3350 17 Gram(s) Oral daily  senna 2 Tablet(s) Oral at bedtime    MEDICATIONS  (PRN):  hydrALAZINE Injectable 10 milliGRAM(s) IV Push every 4 hours PRN For SBP > 170  ondansetron Injectable 4 milliGRAM(s) IV Push every 6 hours PRN Nausea  simethicone 80 milliGRAM(s) Chew every 12 hours PRN Gas  traMADol 25 milliGRAM(s) Oral every 4 hours PRN Moderate Pain (4 - 6)  traMADol 50 milliGRAM(s) Oral every 4 hours PRN Severe Pain (7 - 10)      Vital Signs Last 24 Hrs  T(C): 37.4 (03 May 2020 23:15), Max: 37.4 (03 May 2020 23:15)  T(F): 99.3 (03 May 2020 23:15), Max: 99.3 (03 May 2020 23:15)  HR: 78 (03 May 2020 23:15) (77 - 79)  BP: 162/88 (03 May 2020 23:15) (159/88 - 173/92)  BP(mean): --  RR: 18 (03 May 2020 23:15) (18 - 18)  SpO2: 95% (03 May 2020 23:15) (93% - 96%)      PE  Gen: NAD  Pulm: CTA  CV: RRR  Abd: soft, distended with mild left abdominal tenderness, no rebound, guarding or rigidity      I&O's Detail    02 May 2020 07:01  -  03 May 2020 07:00  --------------------------------------------------------  IN:    sodium chloride 0.9%: 1400 mL  Total IN: 1400 mL    OUT:    Voided: 350 mL  Total OUT: 350 mL    Total NET: 1050 mL      03 May 2020 07:01  -  04 May 2020 01:22  --------------------------------------------------------  IN:    dextrose 5% + sodium chloride 0.45%: 800 mL    Lactated Ringers IV Bolus: 500 mL    sodium chloride 0.9%: 300 mL    Solution: 499.8 mL  Total IN: 2099.8 mL    OUT:  Total OUT: 0 mL    Total NET: 2099.8 mL          LABS:                        12.4   12.37 )-----------( 217      ( 03 May 2020 07:19 )             37.8     05-03    137  |  98  |  15.0  ----------------------------<  118<H>  3.4<L>   |  25.0  |  0.65    Ca    8.8      03 May 2020 07:19  Phos  2.3     05-03  Mg     2.1     05-03    TPro  6.5<L>  /  Alb  3.2<L>  /  TBili  1.0  /  DBili  0.5<H>  /  AST  62<H>  /  ALT  183<H>  /  AlkPhos  81  05-02

## 2020-05-05 LAB
ANION GAP SERPL CALC-SCNC: 17 MMOL/L — SIGNIFICANT CHANGE UP (ref 5–17)
BASOPHILS # BLD AUTO: 0.04 K/UL — SIGNIFICANT CHANGE UP (ref 0–0.2)
BASOPHILS NFR BLD AUTO: 0.4 % — SIGNIFICANT CHANGE UP (ref 0–2)
BUN SERPL-MCNC: 15 MG/DL — SIGNIFICANT CHANGE UP (ref 8–20)
CALCIUM SERPL-MCNC: 8.9 MG/DL — SIGNIFICANT CHANGE UP (ref 8.6–10.2)
CHLORIDE SERPL-SCNC: 98 MMOL/L — SIGNIFICANT CHANGE UP (ref 98–107)
CO2 SERPL-SCNC: 23 MMOL/L — SIGNIFICANT CHANGE UP (ref 22–29)
CREAT SERPL-MCNC: 0.74 MG/DL — SIGNIFICANT CHANGE UP (ref 0.5–1.3)
EOSINOPHIL # BLD AUTO: 0.4 K/UL — SIGNIFICANT CHANGE UP (ref 0–0.5)
EOSINOPHIL NFR BLD AUTO: 3.7 % — SIGNIFICANT CHANGE UP (ref 0–6)
GLUCOSE SERPL-MCNC: 141 MG/DL — HIGH (ref 70–99)
HCT VFR BLD CALC: 38.9 % — LOW (ref 39–50)
HGB BLD-MCNC: 12.9 G/DL — LOW (ref 13–17)
IMM GRANULOCYTES NFR BLD AUTO: 1.3 % — SIGNIFICANT CHANGE UP (ref 0–1.5)
LYMPHOCYTES # BLD AUTO: 1.33 K/UL — SIGNIFICANT CHANGE UP (ref 1–3.3)
LYMPHOCYTES # BLD AUTO: 12.3 % — LOW (ref 13–44)
MAGNESIUM SERPL-MCNC: 2.2 MG/DL — SIGNIFICANT CHANGE UP (ref 1.8–2.6)
MCHC RBC-ENTMCNC: 29.2 PG — SIGNIFICANT CHANGE UP (ref 27–34)
MCHC RBC-ENTMCNC: 33.2 GM/DL — SIGNIFICANT CHANGE UP (ref 32–36)
MCV RBC AUTO: 88 FL — SIGNIFICANT CHANGE UP (ref 80–100)
MONOCYTES # BLD AUTO: 0.62 K/UL — SIGNIFICANT CHANGE UP (ref 0–0.9)
MONOCYTES NFR BLD AUTO: 5.7 % — SIGNIFICANT CHANGE UP (ref 2–14)
NEUTROPHILS # BLD AUTO: 8.32 K/UL — HIGH (ref 1.8–7.4)
NEUTROPHILS NFR BLD AUTO: 76.6 % — SIGNIFICANT CHANGE UP (ref 43–77)
PHOSPHATE SERPL-MCNC: 2.8 MG/DL — SIGNIFICANT CHANGE UP (ref 2.4–4.7)
PLATELET # BLD AUTO: 271 K/UL — SIGNIFICANT CHANGE UP (ref 150–400)
POTASSIUM SERPL-MCNC: 3.5 MMOL/L — SIGNIFICANT CHANGE UP (ref 3.5–5.3)
POTASSIUM SERPL-SCNC: 3.5 MMOL/L — SIGNIFICANT CHANGE UP (ref 3.5–5.3)
RBC # BLD: 4.42 M/UL — SIGNIFICANT CHANGE UP (ref 4.2–5.8)
RBC # FLD: 14.8 % — HIGH (ref 10.3–14.5)
SODIUM SERPL-SCNC: 138 MMOL/L — SIGNIFICANT CHANGE UP (ref 135–145)
WBC # BLD: 10.85 K/UL — HIGH (ref 3.8–10.5)
WBC # FLD AUTO: 10.85 K/UL — HIGH (ref 3.8–10.5)

## 2020-05-05 PROCEDURE — 93970 EXTREMITY STUDY: CPT | Mod: 26

## 2020-05-05 RX ORDER — SIMETHICONE 80 MG/1
1 TABLET, CHEWABLE ORAL
Qty: 0 | Refills: 0 | DISCHARGE
Start: 2020-05-05

## 2020-05-05 RX ORDER — TRAMADOL HYDROCHLORIDE 50 MG/1
1 TABLET ORAL
Qty: 12 | Refills: 0
Start: 2020-05-05

## 2020-05-05 RX ORDER — POTASSIUM PHOSPHATE, MONOBASIC POTASSIUM PHOSPHATE, DIBASIC 236; 224 MG/ML; MG/ML
30 INJECTION, SOLUTION INTRAVENOUS ONCE
Refills: 0 | Status: COMPLETED | OUTPATIENT
Start: 2020-05-05 | End: 2020-05-05

## 2020-05-05 RX ORDER — TRAMADOL HYDROCHLORIDE 50 MG/1
0.5 TABLET ORAL
Qty: 0 | Refills: 0 | DISCHARGE
Start: 2020-05-05

## 2020-05-05 RX ORDER — SIMETHICONE 80 MG/1
1 TABLET, CHEWABLE ORAL
Qty: 30 | Refills: 0
Start: 2020-05-05

## 2020-05-05 RX ORDER — POLYETHYLENE GLYCOL 3350 17 G/17G
17 POWDER, FOR SOLUTION ORAL
Qty: 50 | Refills: 0
Start: 2020-05-05

## 2020-05-05 RX ORDER — SENNA PLUS 8.6 MG/1
2 TABLET ORAL
Qty: 30 | Refills: 0
Start: 2020-05-05

## 2020-05-05 RX ADMIN — Medication 10 MILLIGRAM(S): at 21:37

## 2020-05-05 RX ADMIN — ENOXAPARIN SODIUM 30 MILLIGRAM(S): 100 INJECTION SUBCUTANEOUS at 05:55

## 2020-05-05 RX ADMIN — TRAMADOL HYDROCHLORIDE 50 MILLIGRAM(S): 50 TABLET ORAL at 18:30

## 2020-05-05 RX ADMIN — Medication 650 MILLIGRAM(S): at 05:56

## 2020-05-05 RX ADMIN — ENOXAPARIN SODIUM 30 MILLIGRAM(S): 100 INJECTION SUBCUTANEOUS at 16:11

## 2020-05-05 RX ADMIN — TRAMADOL HYDROCHLORIDE 50 MILLIGRAM(S): 50 TABLET ORAL at 11:26

## 2020-05-05 RX ADMIN — TRAMADOL HYDROCHLORIDE 50 MILLIGRAM(S): 50 TABLET ORAL at 05:55

## 2020-05-05 RX ADMIN — Medication 650 MILLIGRAM(S): at 16:10

## 2020-05-05 RX ADMIN — Medication 650 MILLIGRAM(S): at 21:37

## 2020-05-05 RX ADMIN — LOSARTAN POTASSIUM 25 MILLIGRAM(S): 100 TABLET, FILM COATED ORAL at 05:55

## 2020-05-05 RX ADMIN — SENNA PLUS 2 TABLET(S): 8.6 TABLET ORAL at 21:38

## 2020-05-05 RX ADMIN — PANTOPRAZOLE SODIUM 40 MILLIGRAM(S): 20 TABLET, DELAYED RELEASE ORAL at 05:55

## 2020-05-05 RX ADMIN — POTASSIUM PHOSPHATE, MONOBASIC POTASSIUM PHOSPHATE, DIBASIC 83.33 MILLIMOLE(S): 236; 224 INJECTION, SOLUTION INTRAVENOUS at 15:40

## 2020-05-05 RX ADMIN — CHLORHEXIDINE GLUCONATE 1 APPLICATION(S): 213 SOLUTION TOPICAL at 05:52

## 2020-05-05 NOTE — PROGRESS NOTE ADULT - ASSESSMENT
69 year old male with gallstone pancreatitis, MRCP with no evidence of choledocholithiasis. Clinically stable, afebrile. Tolerating CLD.     -f/u AM labs, improving leukocytosis  -Continue bowel regimen; ADAT. Right now on CLD.   -Pain management  -DVT ppx  -Urology: stable renal cysts and upper right pole nonobstructive calculi, to be followed up non-op  -Pulmonology: small b/l effusion likely 2/2 to pancreatitis, continue IS, followup CT of chest noncontrast in about 4 weeks.

## 2020-05-05 NOTE — PROGRESS NOTE ADULT - SUBJECTIVE AND OBJECTIVE BOX
INTERVAL HPI/OVERNIGHT EVENTS:    Patient seen and evaluated at bedside and found hemodynamically stable and in no acute distress. No acute events overnight. Pain is well controlled and reports that it is improving since admission, continues to have mild gas-pain but was able to have a BM relieving him from symptoms.  Tolerating CLD, without nausea or emesis, encouraged to do IS pulling 1500, and walking. Denies fevers, chills, chest pain, SOB, coughing, dizziness, n/v/d, or generalized malaise.     SUBJECTIVE:      MEDICATIONS  (STANDING):  acetaminophen   Tablet .. 650 milliGRAM(s) Oral every 6 hours  chlorhexidine 4% Liquid 1 Application(s) Topical <User Schedule>  dextrose 5% + sodium chloride 0.45% 1000 milliLiter(s) (50 mL/Hr) IV Continuous <Continuous>  enoxaparin Injectable 30 milliGRAM(s) SubCutaneous every 12 hours  losartan 25 milliGRAM(s) Oral daily  pantoprazole    Tablet 40 milliGRAM(s) Oral before breakfast  polyethylene glycol 3350 17 Gram(s) Oral daily  senna 2 Tablet(s) Oral at bedtime    MEDICATIONS  (PRN):  hydrALAZINE Injectable 10 milliGRAM(s) IV Push every 4 hours PRN For SBP > 170  ondansetron Injectable 4 milliGRAM(s) IV Push every 6 hours PRN Nausea  simethicone 80 milliGRAM(s) Chew every 12 hours PRN Gas  traMADol 25 milliGRAM(s) Oral every 4 hours PRN Moderate Pain (4 - 6)  traMADol 50 milliGRAM(s) Oral every 4 hours PRN Severe Pain (7 - 10)      Vital Signs Last 24 Hrs  T(C): 37.2 (04 May 2020 23:57), Max: 37.2 (04 May 2020 23:57)  T(F): 99 (04 May 2020 23:57), Max: 99 (04 May 2020 23:57)  HR: 80 (04 May 2020 23:57) (59 - 80)  BP: 158/84 (04 May 2020 23:57) (155/81 - 168/93)  BP(mean): --  RR: 18 (04 May 2020 23:57) (18 - 18)  SpO2: 94% (04 May 2020 23:57) (94% - 95%)    PHYSICAL EXAM:    General: lying in bed in no acute distress  HEENT: Neck supple  Chest: non-labored breathing or conversational dyspnea   Abdomen: distended, tympanic, soft and depressible, non-tender. No guarding or rebound, non-peritonitic  Ext: no edema or cyanosis      I&O's Detail    03 May 2020 07:01  -  04 May 2020 07:00  --------------------------------------------------------  IN:    dextrose 5% + sodium chloride 0.45%: 800 mL    Lactated Ringers IV Bolus: 500 mL    sodium chloride 0.9%: 300 mL    Solution: 499.8 mL  Total IN: 2099.8 mL    OUT:  Total OUT: 0 mL    Total NET: 2099.8 mL      04 May 2020 07:01  -  05 May 2020 01:59  --------------------------------------------------------  IN:    dextrose 5% + sodium chloride 0.45%: 300 mL  Total IN: 300 mL    OUT:    Voided: 525 mL  Total OUT: 525 mL    Total NET: -225 mL          LABS:                        11.5   11.75 )-----------( 224      ( 04 May 2020 05:50 )             34.3     05-04    138  |  100  |  14.0  ----------------------------<  133<H>  3.2<L>   |  25.0  |  0.60    Ca    8.6      04 May 2020 05:50  Phos  2.2     05-04  Mg     2.1     05-04    TPro  6.1<L>  /  Alb  3.1<L>  /  TBili  0.6  /  DBili  0.2  /  AST  31  /  ALT  103<H>  /  AlkPhos  75  05-04

## 2020-05-05 NOTE — PROGRESS NOTE ADULT - SUBJECTIVE AND OBJECTIVE BOX
Feels ok  but complains of left knee swelling   afebriel  abd soft minimally tender  Large prostate  left leg swollen at knee no calf tenderness   Will get duplex  R/O dvt  supportive care

## 2020-05-06 ENCOUNTER — TRANSCRIPTION ENCOUNTER (OUTPATIENT)
Age: 70
End: 2020-05-06

## 2020-05-06 VITALS
RESPIRATION RATE: 18 BRPM | DIASTOLIC BLOOD PRESSURE: 78 MMHG | SYSTOLIC BLOOD PRESSURE: 146 MMHG | OXYGEN SATURATION: 94 % | HEART RATE: 65 BPM | TEMPERATURE: 98 F

## 2020-05-06 LAB
ANION GAP SERPL CALC-SCNC: 13 MMOL/L — SIGNIFICANT CHANGE UP (ref 5–17)
BUN SERPL-MCNC: 14 MG/DL — SIGNIFICANT CHANGE UP (ref 8–20)
CALCIUM SERPL-MCNC: 9.3 MG/DL — SIGNIFICANT CHANGE UP (ref 8.6–10.2)
CHLORIDE SERPL-SCNC: 98 MMOL/L — SIGNIFICANT CHANGE UP (ref 98–107)
CO2 SERPL-SCNC: 27 MMOL/L — SIGNIFICANT CHANGE UP (ref 22–29)
CREAT SERPL-MCNC: 0.76 MG/DL — SIGNIFICANT CHANGE UP (ref 0.5–1.3)
GLUCOSE SERPL-MCNC: 107 MG/DL — HIGH (ref 70–99)
MAGNESIUM SERPL-MCNC: 2.3 MG/DL — SIGNIFICANT CHANGE UP (ref 1.6–2.6)
PHOSPHATE SERPL-MCNC: 3.8 MG/DL — SIGNIFICANT CHANGE UP (ref 2.4–4.7)
POTASSIUM SERPL-MCNC: 3.8 MMOL/L — SIGNIFICANT CHANGE UP (ref 3.5–5.3)
POTASSIUM SERPL-SCNC: 3.8 MMOL/L — SIGNIFICANT CHANGE UP (ref 3.5–5.3)
SODIUM SERPL-SCNC: 138 MMOL/L — SIGNIFICANT CHANGE UP (ref 135–145)

## 2020-05-06 PROCEDURE — 80053 COMPREHEN METABOLIC PANEL: CPT

## 2020-05-06 PROCEDURE — 96375 TX/PRO/DX INJ NEW DRUG ADDON: CPT

## 2020-05-06 PROCEDURE — 74018 RADEX ABDOMEN 1 VIEW: CPT

## 2020-05-06 PROCEDURE — 74177 CT ABD & PELVIS W/CONTRAST: CPT

## 2020-05-06 PROCEDURE — 82248 BILIRUBIN DIRECT: CPT

## 2020-05-06 PROCEDURE — 74183 MRI ABD W/O CNTR FLWD CNTR: CPT

## 2020-05-06 PROCEDURE — 83735 ASSAY OF MAGNESIUM: CPT

## 2020-05-06 PROCEDURE — 96376 TX/PRO/DX INJ SAME DRUG ADON: CPT

## 2020-05-06 PROCEDURE — 36415 COLL VENOUS BLD VENIPUNCTURE: CPT

## 2020-05-06 PROCEDURE — 83690 ASSAY OF LIPASE: CPT

## 2020-05-06 PROCEDURE — 87635 SARS-COV-2 COVID-19 AMP PRB: CPT

## 2020-05-06 PROCEDURE — 80048 BASIC METABOLIC PNL TOTAL CA: CPT

## 2020-05-06 PROCEDURE — 93970 EXTREMITY STUDY: CPT

## 2020-05-06 PROCEDURE — 81001 URINALYSIS AUTO W/SCOPE: CPT

## 2020-05-06 PROCEDURE — 99232 SBSQ HOSP IP/OBS MODERATE 35: CPT | Mod: GC

## 2020-05-06 PROCEDURE — G0103: CPT

## 2020-05-06 PROCEDURE — 96374 THER/PROPH/DIAG INJ IV PUSH: CPT

## 2020-05-06 PROCEDURE — 85027 COMPLETE CBC AUTOMATED: CPT

## 2020-05-06 PROCEDURE — 80076 HEPATIC FUNCTION PANEL: CPT

## 2020-05-06 PROCEDURE — 86803 HEPATITIS C AB TEST: CPT

## 2020-05-06 PROCEDURE — 84100 ASSAY OF PHOSPHORUS: CPT

## 2020-05-06 PROCEDURE — 99285 EMERGENCY DEPT VISIT HI MDM: CPT | Mod: 25

## 2020-05-06 RX ADMIN — LOSARTAN POTASSIUM 25 MILLIGRAM(S): 100 TABLET, FILM COATED ORAL at 05:36

## 2020-05-06 RX ADMIN — Medication 650 MILLIGRAM(S): at 05:36

## 2020-05-06 RX ADMIN — ENOXAPARIN SODIUM 30 MILLIGRAM(S): 100 INJECTION SUBCUTANEOUS at 05:36

## 2020-05-06 RX ADMIN — POLYETHYLENE GLYCOL 3350 17 GRAM(S): 17 POWDER, FOR SOLUTION ORAL at 12:06

## 2020-05-06 RX ADMIN — TRAMADOL HYDROCHLORIDE 50 MILLIGRAM(S): 50 TABLET ORAL at 10:57

## 2020-05-06 RX ADMIN — TRAMADOL HYDROCHLORIDE 50 MILLIGRAM(S): 50 TABLET ORAL at 02:31

## 2020-05-06 RX ADMIN — PANTOPRAZOLE SODIUM 40 MILLIGRAM(S): 20 TABLET, DELAYED RELEASE ORAL at 05:36

## 2020-05-06 RX ADMIN — CHLORHEXIDINE GLUCONATE 1 APPLICATION(S): 213 SOLUTION TOPICAL at 05:34

## 2020-05-06 NOTE — DISCHARGE NOTE NURSING/CASE MANAGEMENT/SOCIAL WORK - PATIENT PORTAL LINK FT
You can access the FollowMyHealth Patient Portal offered by [facility] by registering at the following website: [email or web address]. By joining [employer]’s FollowMyHealth portal, you will also be able to view your health information using other applications (apps) compatible with our system.

## 2020-05-06 NOTE — PROGRESS NOTE ADULT - ASSESSMENT
69 year old male with gallstone pancreatitis, MRCP with no evidence of choledocholithiasis. Clinically stable, afebrile. Tolerating DASH diet.     -f/u AM labs  -Continue bowel regimen  -continue DASH  -Pain management  -DVT ppx, b/l duplex yesterday showing no evidence of DVT  -Urology: stable renal cysts and upper right pole nonobstructive calculi, to be followed up non-op  -Pulmonology: small b/l effusion likely 2/2 to pancreatitis, continue IS, followup CT of chest noncontrast in about 4 weeks.   -plan for d/c today pending am labs

## 2020-05-06 NOTE — PROGRESS NOTE ADULT - SUBJECTIVE AND OBJECTIVE BOX
INTERVAL HPI/OVERNIGHT EVENTS: none    SUBJECTIVE:  Patient evaluated at bedside and found resting comfortably in bed, nad. Patient states pain significantly improved since admission. No acute events overnight.  Patient voiding without difficulty. +BM. Tolerating DASH diet, without nausea or emesis. Encouraged to do IS and OOB. Continued mild knee swelling stable on exam. Venous duplex yesterday without evidence of DVT's. Denies fevers, chills, chest pain, SOB, coughing, dizziness, n/v/d, or generalized malaise.    MEDICATIONS  (STANDING):  acetaminophen   Tablet .. 650 milliGRAM(s) Oral every 6 hours  chlorhexidine 4% Liquid 1 Application(s) Topical <User Schedule>  enoxaparin Injectable 30 milliGRAM(s) SubCutaneous every 12 hours  losartan 25 milliGRAM(s) Oral daily  pantoprazole    Tablet 40 milliGRAM(s) Oral before breakfast  polyethylene glycol 3350 17 Gram(s) Oral daily  senna 2 Tablet(s) Oral at bedtime    MEDICATIONS  (PRN):  hydrALAZINE Injectable 10 milliGRAM(s) IV Push every 4 hours PRN For SBP > 170  ondansetron Injectable 4 milliGRAM(s) IV Push every 6 hours PRN Nausea  simethicone 80 milliGRAM(s) Chew every 12 hours PRN Gas  traMADol 25 milliGRAM(s) Oral every 4 hours PRN Moderate Pain (4 - 6)  traMADol 50 milliGRAM(s) Oral every 4 hours PRN Severe Pain (7 - 10)      Vital Signs Last 24 Hrs  T(C): 36.8 (05 May 2020 21:42), Max: 37.2 (05 May 2020 16:34)  T(F): 98.2 (05 May 2020 21:42), Max: 99 (05 May 2020 16:34)  HR: 68 (05 May 2020 23:33) (60 - 70)  BP: 140/73 (05 May 2020 23:33) (140/73 - 176/96)  BP(mean): --  RR: 18 (05 May 2020 21:42) (18 - 18)  SpO2: 94% (05 May 2020 21:42) (92% - 95%)    PE  Gen: resting comfortably in bed, nad  Pulm: no increased wob, no conversational dyspnea  CV: RRR  Abd: non-distended, soft, minimally tender to palpation at epigastric region, non-peritoneal, mildly tympanic, no guarding, no rebound, non-peritoneal  Ext: Left knee minimally swollen at knee, calf without swelling or tenderness bilaterally  Neuro: AOx3      I&O's Detail    04 May 2020 07:01  -  05 May 2020 07:00  --------------------------------------------------------  IN:    dextrose 5% + sodium chloride 0.45%: 500 mL  Total IN: 500 mL    OUT:    Voided: 525 mL  Total OUT: 525 mL    Total NET: -25 mL      05 May 2020 07:01  -  06 May 2020 02:52  --------------------------------------------------------  IN:  Total IN: 0 mL    OUT:    Voided: 400 mL  Total OUT: 400 mL    Total NET: -400 mL          LABS:                        12.9   10.85 )-----------( 271      ( 05 May 2020 10:58 )             38.9     05-05    138  |  98  |  15.0  ----------------------------<  141<H>  3.5   |  23.0  |  0.74    Ca    8.9      05 May 2020 10:58  Phos  2.8     05-05  Mg     2.2     05-05    TPro  6.1<L>  /  Alb  3.1<L>  /  TBili  0.6  /  DBili  0.2  /  AST  31  /  ALT  103<H>  /  AlkPhos  75  05-04          RADIOLOGY & ADDITIONAL STUDIES:

## 2020-05-07 LAB — PSA FLD-MCNC: 9.7 NG/ML — HIGH (ref 0–4)

## 2020-05-25 ENCOUNTER — EMERGENCY (EMERGENCY)
Facility: HOSPITAL | Age: 70
LOS: 1 days | End: 2020-05-25
Admitting: EMERGENCY MEDICINE
Payer: MEDICARE

## 2020-05-25 ENCOUNTER — INPATIENT (INPATIENT)
Facility: HOSPITAL | Age: 70
LOS: 1 days | Discharge: HOME | DRG: 418 | End: 2020-05-27
Attending: SURGERY | Admitting: SURGERY
Payer: MEDICARE

## 2020-05-25 VITALS
HEART RATE: 79 BPM | HEIGHT: 71 IN | WEIGHT: 199.96 LBS | SYSTOLIC BLOOD PRESSURE: 167 MMHG | DIASTOLIC BLOOD PRESSURE: 93 MMHG | TEMPERATURE: 98 F | OXYGEN SATURATION: 98 %

## 2020-05-25 DIAGNOSIS — K64.9 UNSPECIFIED HEMORRHOIDS: Chronic | ICD-10-CM

## 2020-05-25 DIAGNOSIS — K85.10 BILIARY ACUTE PANCREATITIS WITHOUT NECROSIS OR INFECTION: ICD-10-CM

## 2020-05-25 DIAGNOSIS — M51.9 UNSPECIFIED THORACIC, THORACOLUMBAR AND LUMBOSACRAL INTERVERTEBRAL DISC DISORDER: Chronic | ICD-10-CM

## 2020-05-25 LAB
ABO RH CONFIRMATION: SIGNIFICANT CHANGE UP
ALBUMIN SERPL ELPH-MCNC: 3.5 G/DL — SIGNIFICANT CHANGE UP (ref 3.3–5.2)
ALP SERPL-CCNC: 153 U/L — HIGH (ref 40–120)
ALT FLD-CCNC: 609 U/L — HIGH
ANION GAP SERPL CALC-SCNC: 14 MMOL/L — SIGNIFICANT CHANGE UP (ref 5–17)
APPEARANCE UR: CLEAR — SIGNIFICANT CHANGE UP
APTT BLD: 28 SEC — SIGNIFICANT CHANGE UP (ref 27.5–36.3)
AST SERPL-CCNC: 495 U/L — HIGH
BACTERIA # UR AUTO: ABNORMAL
BASOPHILS # BLD AUTO: 0.02 K/UL — SIGNIFICANT CHANGE UP (ref 0–0.2)
BASOPHILS NFR BLD AUTO: 0.3 % — SIGNIFICANT CHANGE UP (ref 0–2)
BILIRUB SERPL-MCNC: 3.8 MG/DL — HIGH (ref 0.4–2)
BILIRUB UR-MCNC: ABNORMAL
BLD GP AB SCN SERPL QL: SIGNIFICANT CHANGE UP
BUN SERPL-MCNC: 15 MG/DL — SIGNIFICANT CHANGE UP (ref 8–20)
CALCIUM SERPL-MCNC: 9 MG/DL — SIGNIFICANT CHANGE UP (ref 8.6–10.2)
CHLORIDE SERPL-SCNC: 97 MMOL/L — LOW (ref 98–107)
CO2 SERPL-SCNC: 26 MMOL/L — SIGNIFICANT CHANGE UP (ref 22–29)
COLOR SPEC: ABNORMAL
CREAT SERPL-MCNC: 0.63 MG/DL — SIGNIFICANT CHANGE UP (ref 0.5–1.3)
DIFF PNL FLD: ABNORMAL
EOSINOPHIL # BLD AUTO: 0.15 K/UL — SIGNIFICANT CHANGE UP (ref 0–0.5)
EOSINOPHIL NFR BLD AUTO: 2 % — SIGNIFICANT CHANGE UP (ref 0–6)
EPI CELLS # UR: SIGNIFICANT CHANGE UP
GLUCOSE SERPL-MCNC: 110 MG/DL — HIGH (ref 70–99)
GLUCOSE UR QL: NEGATIVE MG/DL — SIGNIFICANT CHANGE UP
HCT VFR BLD CALC: 41 % — SIGNIFICANT CHANGE UP (ref 39–50)
HGB BLD-MCNC: 13.1 G/DL — SIGNIFICANT CHANGE UP (ref 13–17)
IMM GRANULOCYTES NFR BLD AUTO: 0.3 % — SIGNIFICANT CHANGE UP (ref 0–1.5)
INR BLD: 1.06 RATIO — SIGNIFICANT CHANGE UP (ref 0.88–1.16)
KETONES UR-MCNC: ABNORMAL
LDH SERPL L TO P-CCNC: 292 U/L — HIGH (ref 98–192)
LEUKOCYTE ESTERASE UR-ACNC: ABNORMAL
LIDOCAIN IGE QN: 1271 U/L — HIGH (ref 22–51)
LYMPHOCYTES # BLD AUTO: 1.35 K/UL — SIGNIFICANT CHANGE UP (ref 1–3.3)
LYMPHOCYTES # BLD AUTO: 17.9 % — SIGNIFICANT CHANGE UP (ref 13–44)
MCHC RBC-ENTMCNC: 28.4 PG — SIGNIFICANT CHANGE UP (ref 27–34)
MCHC RBC-ENTMCNC: 32 GM/DL — SIGNIFICANT CHANGE UP (ref 32–36)
MCV RBC AUTO: 88.7 FL — SIGNIFICANT CHANGE UP (ref 80–100)
MONOCYTES # BLD AUTO: 0.59 K/UL — SIGNIFICANT CHANGE UP (ref 0–0.9)
MONOCYTES NFR BLD AUTO: 7.8 % — SIGNIFICANT CHANGE UP (ref 2–14)
NEUTROPHILS # BLD AUTO: 5.41 K/UL — SIGNIFICANT CHANGE UP (ref 1.8–7.4)
NEUTROPHILS NFR BLD AUTO: 71.7 % — SIGNIFICANT CHANGE UP (ref 43–77)
NITRITE UR-MCNC: NEGATIVE — SIGNIFICANT CHANGE UP
PH UR: 5 — SIGNIFICANT CHANGE UP (ref 5–8)
PLATELET # BLD AUTO: 204 K/UL — SIGNIFICANT CHANGE UP (ref 150–400)
POTASSIUM SERPL-MCNC: 3.5 MMOL/L — SIGNIFICANT CHANGE UP (ref 3.5–5.3)
POTASSIUM SERPL-SCNC: 3.5 MMOL/L — SIGNIFICANT CHANGE UP (ref 3.5–5.3)
PROT SERPL-MCNC: 6.5 G/DL — LOW (ref 6.6–8.7)
PROT UR-MCNC: 30 MG/DL
PROTHROM AB SERPL-ACNC: 12 SEC — SIGNIFICANT CHANGE UP (ref 10–12.9)
RBC # BLD: 4.62 M/UL — SIGNIFICANT CHANGE UP (ref 4.2–5.8)
RBC # FLD: 13.9 % — SIGNIFICANT CHANGE UP (ref 10.3–14.5)
RBC CASTS # UR COMP ASSIST: SIGNIFICANT CHANGE UP /HPF (ref 0–4)
SODIUM SERPL-SCNC: 137 MMOL/L — SIGNIFICANT CHANGE UP (ref 135–145)
SP GR SPEC: 1.02 — SIGNIFICANT CHANGE UP (ref 1.01–1.02)
TROPONIN T SERPL-MCNC: <0.01 NG/ML — SIGNIFICANT CHANGE UP (ref 0–0.06)
UROBILINOGEN FLD QL: 4 MG/DL
WBC # BLD: 7.54 K/UL — SIGNIFICANT CHANGE UP (ref 3.8–10.5)
WBC # FLD AUTO: 7.54 K/UL — SIGNIFICANT CHANGE UP (ref 3.8–10.5)
WBC UR QL: SIGNIFICANT CHANGE UP

## 2020-05-25 PROCEDURE — 71045 X-RAY EXAM CHEST 1 VIEW: CPT | Mod: 26

## 2020-05-25 PROCEDURE — 76705 ECHO EXAM OF ABDOMEN: CPT | Mod: 26

## 2020-05-25 PROCEDURE — 99285 EMERGENCY DEPT VISIT HI MDM: CPT

## 2020-05-25 PROCEDURE — 74177 CT ABD & PELVIS W/CONTRAST: CPT | Mod: 26

## 2020-05-25 PROCEDURE — 74183 MRI ABD W/O CNTR FLWD CNTR: CPT | Mod: 26

## 2020-05-25 PROCEDURE — 99285 EMERGENCY DEPT VISIT HI MDM: CPT | Mod: GC

## 2020-05-25 PROCEDURE — 93010 ELECTROCARDIOGRAM REPORT: CPT

## 2020-05-25 PROCEDURE — 99222 1ST HOSP IP/OBS MODERATE 55: CPT

## 2020-05-25 RX ORDER — ONDANSETRON 8 MG/1
4 TABLET, FILM COATED ORAL EVERY 8 HOURS
Refills: 0 | Status: DISCONTINUED | OUTPATIENT
Start: 2020-05-25 | End: 2020-05-27

## 2020-05-25 RX ORDER — PANTOPRAZOLE SODIUM 20 MG/1
40 TABLET, DELAYED RELEASE ORAL DAILY
Refills: 0 | Status: DISCONTINUED | OUTPATIENT
Start: 2020-05-25 | End: 2020-05-27

## 2020-05-25 RX ORDER — SODIUM CHLORIDE 9 MG/ML
1000 INJECTION INTRAMUSCULAR; INTRAVENOUS; SUBCUTANEOUS ONCE
Refills: 0 | Status: COMPLETED | OUTPATIENT
Start: 2020-05-25 | End: 2020-05-25

## 2020-05-25 RX ORDER — SODIUM CHLORIDE 9 MG/ML
1000 INJECTION INTRAMUSCULAR; INTRAVENOUS; SUBCUTANEOUS
Refills: 0 | Status: DISCONTINUED | OUTPATIENT
Start: 2020-05-25 | End: 2020-05-27

## 2020-05-25 RX ADMIN — SODIUM CHLORIDE 1000 MILLILITER(S): 9 INJECTION INTRAMUSCULAR; INTRAVENOUS; SUBCUTANEOUS at 20:15

## 2020-05-25 NOTE — ED ADULT TRIAGE NOTE - CHIEF COMPLAINT QUOTE
Patient transferred from [facility] for GI consult.  Pt w/ pancreatis and possible gallstones.  Patient arrives alert and oriented, no acute distress.

## 2020-05-25 NOTE — H&P ADULT - ASSESSMENT
70 yo M presents with acute interstitial pancreatitis, likely secondary to gallstone disease. Patient with known history of gallstone pancreatitis and presents with similar symptoms, elevated lipase, and US GB with gallstones and sludge.    - Admit to general surgery under Dr. Paulino  - NPO/IVF  - serial abdominal exams  - f/u MRCP  - GI consulted in ED - recs pending  - possible ERCP this admission  - plan for lap rodriguez once pancreatitis clinically resolves

## 2020-05-25 NOTE — ED PROVIDER NOTE - NS ED ROS FT
CONSTITUTIONAL: No fevers, no chills  Eyes: No vision changes  Cardiovascular: No Chest pain  Respiratory: No SOB  Gastrointestinal: +n/v, no c/d, +abd pain  Genitourinary: no dysuria, no hematuria  SKIN: no rashes.  MSK: no weakness, no myalgias, no arthralgias  NEURO: no headache, no weakness, no numbness  PSYCHIATRIC: no known mental health issues.

## 2020-05-25 NOTE — H&P ADULT - NSHPREVIEWOFSYSTEMS_GEN_ALL_CORE
Patient denies f/c/sob/n/v/chest pain. Patient reports normal bowel function with +flatus and +stool.

## 2020-05-25 NOTE — ED PROVIDER NOTE - PHYSICAL EXAMINATION
General: well appearing, interactive, well nourished, NAD  HEENT: pupils equal and reactive, +scleral icterus, normal external ears bilaterally   Cardiac: RRR, no MRG appreciated  Resp: lungs clear to auscultation bilaterally, symmetric chest wall rise  Abd: soft, nontender, nondistended,   : no CVA tenderness  Neuro: Moving all extremities  Skin:  normal color for race

## 2020-05-25 NOTE — H&P ADULT - NSHPLABSRESULTS_GEN_ALL_CORE
LABS:  cret                        13.1   7.54  )-----------( 204      ( 25 May 2020 20:01 )             41.0     05-25    137  |  97<L>  |  15.0  ----------------------------<  110<H>  3.5   |  26.0  |  0.63    Ca    9.0      25 May 2020 20:01    TPro  6.5<L>  /  Alb  3.5  /  TBili  3.8<H>  /  DBili  x   /  AST  495<H>  /  ALT  609<H>  /  AlkPhos  153<H>  05-25    PT/INR - ( 25 May 2020 20:00 )   PT: 12.0 sec;   INR: 1.06 ratio         PTT - ( 25 May 2020 20:00 )  PTT:28.0 sec

## 2020-05-25 NOTE — H&P ADULT - HISTORY OF PRESENT ILLNESS
Mr. Browne is a 68yo M w/ history of HTN recently discharged from [facility] after an episode of gallstone pancreatitis. Patient presents today with 3 day history of sharp epigastric pain with associated nausea and non bloody non bilious vomiting. abdominal pain radiates to the L flank. Patient presented to [facility] for the pain. Patient was afebrile and hemodynamically stable at [facility], however, labs revealed elevated lipase to 2148, elevated bilirubin, and transaminitis. Patient underwent CT A/P which revealed gallstones with no signs of acute cholecystitis. US GB showed GB sludge and gallstones with a distended GB with no GB wall thickening and no pericholecystic fluid. CBD measured 4mm. Labs were otherwise non significant. Patient was transferred to [facility] for evaluation by GI for possible ERCP.

## 2020-05-25 NOTE — ED ADULT NURSE NOTE - OBJECTIVE STATEMENT
69y Male A&Ox4 transferred from [facility] for pancreatitis. Pt c/o 5 days of upper quadrant pain with associated n/v. Pt in no distress at this time. Pt denies LOC, chest pain, shortness of breath, changes in GI/ patterns. No obvious signs of trauma or injury.

## 2020-05-25 NOTE — H&P ADULT - ATTENDING COMMENTS
Patient seen and examined in ED. Above note reviewed and edited where appropriate    Pt returns with recurrent gallstone pancreatitis. Complains of epigastric pain  AFVSS  Abdomen soft, epigastric and midabdominal tenderness. no rebound or guarding  Labs reviewed. repeat COVID pending  MRCP pending  NPO, IVF, f/u MRCP  GI consult pending MRCP  Will require cholecystectomy prior to discharge. Patient understands and agrees.

## 2020-05-25 NOTE — H&P ADULT - NSHPPHYSICALEXAM_GEN_ALL_CORE
Physical Exam:   Gen: well appearing male, NAD  Neuro: AOx3, EOMI, PERRLA, no gross deficit on exam  HEENT: No oral/mucosal lesions, no neck masses or lymphadenopathy  RESP: CTABL, nonlabored breathing  CVS: RRR no gallops murmurs or rubs, normal s1, s2  GI: abd soft, tender to the epigastrum and LUQ, ND, no rebound/guarding  Extremities: 2+ peripheral pulses

## 2020-05-25 NOTE — ED ADULT NURSE NOTE - CHIEF COMPLAINT QUOTE
[place] [facility] [place].  Pt w/ pancreatis and possible gallstones.  Patient arrives alert and oriented, no acute distress.

## 2020-05-25 NOTE — ED PROVIDER NOTE - ATTENDING CONTRIBUTION TO CARE
HPI: 70yo male PMH gallstone pancreatitis, HTN, [place] [facility] with [place] pain, nausea/vomiting- found to have gallstone pancreatitis with transaminitis and dilated CBD concerning for choledocholithiasis. patient given zofran and IV tylenol with improvement in symptoms. States that abdomen feels distended but pain 5/10. No fevers/chill.     PE:  Gen: NAD  Head: NCAT  HEENT: Oral mucosa moist, normal conjunctiva  CV: RRR no murmurs, normal perfusion  Resp: CTA b/l, no wheezing, rales, rhonchi, no respiratory distress  GI: Abd Soft +TTP epigastric region/RUQ, no guarding/rigidity  Neuro: No focal neuro deficits  MSK: FROM all 4 extremities, no deformity  Skin: No rash, no bruising  Psych: Normal affect    MDM: 70yo male with gallstone pancreatitis- check labs, d/w surgery and GI, admit. Pia Casas DO         I performed a history and physical exam of the patient and discussed their management with the resident. I reviewed the resident's note and agree with the documented findings and plan of care. My medical decision making and observations are found above.

## 2020-05-25 NOTE — ED PROVIDER NOTE - OBJECTIVE STATEMENT
Pt is a 68 y/o M w/PMHx Pancreatitis, BPH, Bigeminy, HTN, Lumbar disc disease, Bigeminy transferred from [facility] due to gallstone pancreatitis.  He began to have re-occurence of abdominal pain n/v Saturday and came to [facility] for evaluation.  His CT and US demonstrated acute pancreatitis, fluid collection, and CBD dilation, and mobile gallstones.  Pt received a fluid bolus, zofran, and IV tylenol with symptomatic improvement.  Pt states that he was seen last month for similar concern and treated for the acute episode, but was unable to follow up for interval cholecystectomy due to COVID restriction on scheduling.  Pt denies EtOH, sick contacts, fever, chills, shortness of breath, chest pain.

## 2020-05-25 NOTE — ED PROVIDER NOTE - CLINICAL SUMMARY MEDICAL DECISION MAKING FREE TEXT BOX
Pt is a 68 y/o M presents c/o gallstone prancreatitis, will start IVF, labs, MRCP, consult surgery, reassess

## 2020-05-26 ENCOUNTER — TRANSCRIPTION ENCOUNTER (OUTPATIENT)
Age: 70
End: 2020-05-26

## 2020-05-26 DIAGNOSIS — R79.89 OTHER SPECIFIED ABNORMAL FINDINGS OF BLOOD CHEMISTRY: ICD-10-CM

## 2020-05-26 DIAGNOSIS — K85.10 BILIARY ACUTE PANCREATITIS WITHOUT NECROSIS OR INFECTION: ICD-10-CM

## 2020-05-26 LAB
ALBUMIN SERPL ELPH-MCNC: 3.4 G/DL — SIGNIFICANT CHANGE UP (ref 3.3–5.2)
ALP SERPL-CCNC: 148 U/L — HIGH (ref 40–120)
ALT FLD-CCNC: 488 U/L — HIGH
AMYLASE P1 CFR SERPL: 356 U/L — HIGH (ref 36–128)
ANION GAP SERPL CALC-SCNC: 13 MMOL/L — SIGNIFICANT CHANGE UP (ref 5–17)
AST SERPL-CCNC: 295 U/L — HIGH
BASOPHILS # BLD AUTO: 0.03 K/UL — SIGNIFICANT CHANGE UP (ref 0–0.2)
BASOPHILS NFR BLD AUTO: 0.4 % — SIGNIFICANT CHANGE UP (ref 0–2)
BILIRUB DIRECT SERPL-MCNC: 0.8 MG/DL — HIGH (ref 0–0.3)
BILIRUB INDIRECT FLD-MCNC: 0.8 MG/DL — SIGNIFICANT CHANGE UP (ref 0.2–1)
BILIRUB SERPL-MCNC: 1.6 MG/DL — SIGNIFICANT CHANGE UP (ref 0.4–2)
BUN SERPL-MCNC: 16 MG/DL — SIGNIFICANT CHANGE UP (ref 8–20)
CALCIUM SERPL-MCNC: 9 MG/DL — SIGNIFICANT CHANGE UP (ref 8.6–10.2)
CHLORIDE SERPL-SCNC: 100 MMOL/L — SIGNIFICANT CHANGE UP (ref 98–107)
CO2 SERPL-SCNC: 26 MMOL/L — SIGNIFICANT CHANGE UP (ref 22–29)
CREAT SERPL-MCNC: 0.74 MG/DL — SIGNIFICANT CHANGE UP (ref 0.5–1.3)
EOSINOPHIL # BLD AUTO: 0.35 K/UL — SIGNIFICANT CHANGE UP (ref 0–0.5)
EOSINOPHIL NFR BLD AUTO: 4.5 % — SIGNIFICANT CHANGE UP (ref 0–6)
GLUCOSE SERPL-MCNC: 90 MG/DL — SIGNIFICANT CHANGE UP (ref 70–99)
HCT VFR BLD CALC: 38.1 % — LOW (ref 39–50)
HGB BLD-MCNC: 12.5 G/DL — LOW (ref 13–17)
IMM GRANULOCYTES NFR BLD AUTO: 0.5 % — SIGNIFICANT CHANGE UP (ref 0–1.5)
LIDOCAIN IGE QN: 195 U/L — HIGH (ref 22–51)
LYMPHOCYTES # BLD AUTO: 1.29 K/UL — SIGNIFICANT CHANGE UP (ref 1–3.3)
LYMPHOCYTES # BLD AUTO: 16.7 % — SIGNIFICANT CHANGE UP (ref 13–44)
MAGNESIUM SERPL-MCNC: 1.9 MG/DL — SIGNIFICANT CHANGE UP (ref 1.6–2.6)
MCHC RBC-ENTMCNC: 28.9 PG — SIGNIFICANT CHANGE UP (ref 27–34)
MCHC RBC-ENTMCNC: 32.8 GM/DL — SIGNIFICANT CHANGE UP (ref 32–36)
MCV RBC AUTO: 88.2 FL — SIGNIFICANT CHANGE UP (ref 80–100)
MONOCYTES # BLD AUTO: 0.51 K/UL — SIGNIFICANT CHANGE UP (ref 0–0.9)
MONOCYTES NFR BLD AUTO: 6.6 % — SIGNIFICANT CHANGE UP (ref 2–14)
NEUTROPHILS # BLD AUTO: 5.49 K/UL — SIGNIFICANT CHANGE UP (ref 1.8–7.4)
NEUTROPHILS NFR BLD AUTO: 71.3 % — SIGNIFICANT CHANGE UP (ref 43–77)
PHOSPHATE SERPL-MCNC: 1.8 MG/DL — LOW (ref 2.4–4.7)
PLATELET # BLD AUTO: 199 K/UL — SIGNIFICANT CHANGE UP (ref 150–400)
POTASSIUM SERPL-MCNC: 3.7 MMOL/L — SIGNIFICANT CHANGE UP (ref 3.5–5.3)
POTASSIUM SERPL-SCNC: 3.7 MMOL/L — SIGNIFICANT CHANGE UP (ref 3.5–5.3)
PROT SERPL-MCNC: 6.3 G/DL — LOW (ref 6.6–8.7)
RBC # BLD: 4.32 M/UL — SIGNIFICANT CHANGE UP (ref 4.2–5.8)
RBC # FLD: 14.1 % — SIGNIFICANT CHANGE UP (ref 10.3–14.5)
SARS-COV-2 RNA SPEC QL NAA+PROBE: SIGNIFICANT CHANGE UP
SODIUM SERPL-SCNC: 138 MMOL/L — SIGNIFICANT CHANGE UP (ref 135–145)
WBC # BLD: 7.71 K/UL — SIGNIFICANT CHANGE UP (ref 3.8–10.5)
WBC # FLD AUTO: 7.71 K/UL — SIGNIFICANT CHANGE UP (ref 3.8–10.5)

## 2020-05-26 PROCEDURE — 93010 ELECTROCARDIOGRAM REPORT: CPT

## 2020-05-26 PROCEDURE — 99223 1ST HOSP IP/OBS HIGH 75: CPT

## 2020-05-26 PROCEDURE — 99232 SBSQ HOSP IP/OBS MODERATE 35: CPT

## 2020-05-26 RX ORDER — ENOXAPARIN SODIUM 100 MG/ML
40 INJECTION SUBCUTANEOUS DAILY
Refills: 0 | Status: DISCONTINUED | OUTPATIENT
Start: 2020-05-26 | End: 2020-05-27

## 2020-05-26 RX ORDER — POTASSIUM CHLORIDE 20 MEQ
10 PACKET (EA) ORAL
Refills: 0 | Status: COMPLETED | OUTPATIENT
Start: 2020-05-26 | End: 2020-05-26

## 2020-05-26 RX ORDER — MAGNESIUM SULFATE 500 MG/ML
2 VIAL (ML) INJECTION ONCE
Refills: 0 | Status: COMPLETED | OUTPATIENT
Start: 2020-05-26 | End: 2020-05-26

## 2020-05-26 RX ORDER — ACETAMINOPHEN 500 MG
650 TABLET ORAL EVERY 6 HOURS
Refills: 0 | Status: DISCONTINUED | OUTPATIENT
Start: 2020-05-26 | End: 2020-05-27

## 2020-05-26 RX ADMIN — ENOXAPARIN SODIUM 40 MILLIGRAM(S): 100 INJECTION SUBCUTANEOUS at 12:09

## 2020-05-26 RX ADMIN — PANTOPRAZOLE SODIUM 40 MILLIGRAM(S): 20 TABLET, DELAYED RELEASE ORAL at 12:10

## 2020-05-26 RX ADMIN — SODIUM CHLORIDE 125 MILLILITER(S): 9 INJECTION INTRAMUSCULAR; INTRAVENOUS; SUBCUTANEOUS at 20:20

## 2020-05-26 RX ADMIN — Medication 100 MILLIEQUIVALENT(S): at 16:15

## 2020-05-26 RX ADMIN — Medication 100 MILLIEQUIVALENT(S): at 14:05

## 2020-05-26 RX ADMIN — SODIUM CHLORIDE 125 MILLILITER(S): 9 INJECTION INTRAMUSCULAR; INTRAVENOUS; SUBCUTANEOUS at 06:23

## 2020-05-26 RX ADMIN — Medication 50 GRAM(S): at 17:26

## 2020-05-26 RX ADMIN — Medication 100 MILLIEQUIVALENT(S): at 12:42

## 2020-05-26 NOTE — CONSULT NOTE ADULT - SUBJECTIVE AND OBJECTIVE BOX
Patient is a 69y old  Male who presents with a chief complaint of     HPI:  Mr. Browne is a 70yo M w/ history of HTN recently discharged from [facility] in early May after an episode of gallstone pancreatitis and cholecystitis both of which resolved without surgical intervention. he was doing well until three days ago when he noted the onseto of burning left sided abdominal pain, left mid and left lower with radiation to epigastrium. The pain worsened with recurrent nausea and vomiting for which [place] [facility] [place] and was transferred here for possible ERCP dye to elevated LFTs.. This AM he feels well and is asymptomatic with no furether abdominal pain, nausea or vomiting.  Patient was afebrile and hemodynamically stable [place] [facility] where labs revealed elevated lipase to 2148, elevated bilirubin, and transaminitis. Patient underwent CT A/P which revealed gallstones with no signs of acute cholecystitis. US GB showed GB sludge and gallstones with a distended GB with no GB wall thickening and no pericholecystic fluid. CBD measured 4mm. Labs were otherwise non significant. MTCP done but not yet read. There is no diarrhea, constipation, rectal bleeding or melena.      REVIEW OF SYSTEMS:    CONSTITUTIONAL: No fever, weight loss, or fatigue  EYES: No eye pain, visual disturbances, or discharge  ENMT:  No difficulty hearing, tinnitus, vertigo; No sinus or throat pain  NECK: No pain or stiffness  RESPIRATORY: No cough, wheezing, chills or hemoptysis; No shortness of breath  CARDIOVASCULAR: No chest pain, palpitations, dizziness, or leg swelling  GASTROINTESTINAL: as above  NEUROLOGICAL: No headaches, memory loss, loss of strength, numbness, or tremors  SKIN: No itching, burning, rashes, or lesions   LYMPH NODES: No enlarged glands  MUSCULOSKELETAL: No joint pain or swelling; No muscle, back, or extremity pain  PSYCHIATRIC: No depression, anxiety, mood swings, or difficulty sleeping  HEME/LYMPH: No easy bruising, or bleeding gums  ALLERY AND IMMUNOLOGIC: No hives or eczema      PAST MEDICAL & SURGICAL HISTORY:  Lumbar disc disease  Hard of hearing: Exostosis left ear  in May 2015  BPH (benign prostatic hypertrophy)  Exostosis: left ear canal  Bigeminy  Hypertension  Lumbar disc disease: lumbar disc surgery 15 years ago  Hemorrhoid: 2012  Hemorrhoidectomy      FAMILY HISTORY:  Family history of cancer      SOCIAL HISTORY:  Smoking Status: [ ] Current, [ ] Former, [x ] Never  Pack Years:  Alcohol Use: rare    Home Medications:  acetaminophen 325 mg oral tablet: 2 tab(s) orally every 6 hours (30 Apr 2020 08:56)  Edarbyclor 40 mg-12.5 mg oral tablet: 1 tab(s) orally once a day (29 Apr 2020 06:41)  simethicone 80 mg oral tablet, chewable: 1 tab(s) orally every 12 hours, As needed, Gas (05 May 2020 10:44)  traMADol 50 mg oral tablet: 0.5 tab(s) orally every 4 hours, As needed, Moderate Pain (4 - 6) (05 May 2020 10:44)      MEDICATIONS:  MEDICATIONS  (STANDING):  pantoprazole  Injectable 40 milliGRAM(s) IV Push daily  sodium chloride 0.9%. 1000 milliLiter(s) (125 mL/Hr) IV Continuous <Continuous>    MEDICATIONS  (PRN):  ondansetron Injectable 4 milliGRAM(s) IV Push every 8 hours PRN Nausea and/or Vomiting      Allergies    No Known Allergies    Intolerances        Vital Signs Last 24 Hrs  T(C): 36.4 (25 May 2020 19:08), Max: 36.4 (25 May 2020 19:08)  T(F): 97.6 (25 May 2020 19:08), Max: 97.6 (25 May 2020 19:08)  HR: 53 (26 May 2020 00:28) (53 - 79)  BP: 170/76 (26 May 2020 00:28) (167/93 - 170/76)  BP(mean): --  RR: 18 (26 May 2020 00:28) (18 - 18)  SpO2: 96% (26 May 2020 00:28) (96% - 98%)        PHYSICAL EXAM:    General: Well developed; well nourished; in no acute distress  HEENT: MMM, conjunctiva and sclera clear  Lungs: Clear  Heart: Rhythm Regular, No Murmurs  Gastrointestinal: Soft, non-tender non-distended; Normal bowel sounds; No rebound or guarding; No Organomegaly & No Masses  Extremities: Normal range of motion, No clubbing, cyanosis or edema  Neurological: Alert and oriented x3, Non-focal  Skin: Warm and dry. No obvious rash        LABS:                        13.1   7.54  )-----------( 204      ( 25 May 2020 20:01 )             41.0     05-25    137  |  97<L>  |  15.0  ----------------------------<  110<H>  3.5   |  26.0  |  0.63    Ca    9.0      25 May 2020 20:01    TPro  6.5<L>  /  Alb  3.5  /  TBili  3.8<H>  /  DBili  x   /  AST  495<H>  /  ALT  609<H>  /  AlkPhos  153<H>  05-25

## 2020-05-26 NOTE — PROGRESS NOTE ADULT - SUBJECTIVE AND OBJECTIVE BOX
HPI/OVERNIGHT EVENTS: no acute events. Patient had MRCP last night. final reads pending. Patient reports improvement in epigastric pain since initiation of IVF. Denies f/c/sob/n/v.    MEDICATIONS  (STANDING):  pantoprazole  Injectable 40 milliGRAM(s) IV Push daily  sodium chloride 0.9%. 1000 milliLiter(s) (100 mL/Hr) IV Continuous <Continuous>    MEDICATIONS  (PRN):  ondansetron Injectable 4 milliGRAM(s) IV Push every 8 hours PRN Nausea and/or Vomiting      Vital Signs Last 24 Hrs  T(C): 36.4 (25 May 2020 19:08), Max: 36.4 (25 May 2020 19:08)  T(F): 97.6 (25 May 2020 19:08), Max: 97.6 (25 May 2020 19:08)  HR: 79 (25 May 2020 19:08) (79 - 79)  BP: 167/93 (25 May 2020 19:08) (167/93 - 167/93)  BP(mean): --  RR: --  SpO2: 98% (25 May 2020 19:08) (98% - 98%)      Gen: well appearing male, NAD  	Neuro: AOx3, EOMI, PERRLA, no gross deficit on exam  	HEENT: No oral/mucosal lesions, no neck masses or lymphadenopathy  	RESP: CTABL, nonlabored breathing  	CVS: RRR no gallops murmurs or rubs, normal s1, s2  	GI: abd soft, tender to the epigastrum and LUQ, ND, no rebound/guarding    Extremities: 2+ peripheral pulses      I&O's Detail      LABS:                        13.1   7.54  )-----------( 204      ( 25 May 2020 20:01 )             41.0     05-    137  |  97<L>  |  15.0  ----------------------------<  110<H>  3.5   |  26.0  |  0.63    Ca    9.0      25 May 2020 20:01    TPro  6.5<L>  /  Alb  3.5  /  TBili  3.8<H>  /  DBili  x   /  AST  495<H>  /  ALT  609<H>  /  AlkPhos  153<H>  05-25    PT/INR - ( 25 May 2020 20:00 )   PT: 12.0 sec;   INR: 1.06 ratio         PTT - ( 25 May 2020 20:00 )  PTT:28.0 sec  Urinalysis Basic - ( 25 May 2020 23:32 )    Color: Giana / Appearance: Clear / S.020 / pH: x  Gluc: x / Ketone: Large  / Bili: Moderate / Urobili: 4 mg/dL   Blood: x / Protein: 30 mg/dL / Nitrite: Negative   Leuk Esterase: Trace / RBC: 0-2 /HPF / WBC 0-2   Sq Epi: x / Non Sq Epi: Occasional / Bacteria: Occasional

## 2020-05-26 NOTE — CONSULT NOTE ADULT - PROBLEM SELECTOR RECOMMENDATION 2
either due to pancreatitis itswlf cersus passed or retained CBD stone. Will check MRCP and if positive for CBD stone will schedule for ERCP. If negative suggest F/U LFTs and surgery.

## 2020-05-27 ENCOUNTER — RESULT REVIEW (OUTPATIENT)
Age: 70
End: 2020-05-27

## 2020-05-27 VITALS
OXYGEN SATURATION: 99 % | DIASTOLIC BLOOD PRESSURE: 60 MMHG | HEART RATE: 64 BPM | RESPIRATION RATE: 16 BRPM | TEMPERATURE: 97 F | SYSTOLIC BLOOD PRESSURE: 159 MMHG

## 2020-05-27 LAB
ALBUMIN SERPL ELPH-MCNC: 3.6 G/DL — SIGNIFICANT CHANGE UP (ref 3.3–5.2)
ALP SERPL-CCNC: 144 U/L — HIGH (ref 40–120)
ALT FLD-CCNC: 337 U/L — HIGH
ANION GAP SERPL CALC-SCNC: 16 MMOL/L — SIGNIFICANT CHANGE UP (ref 5–17)
AST SERPL-CCNC: 142 U/L — HIGH
BILIRUB DIRECT SERPL-MCNC: 0.5 MG/DL — HIGH (ref 0–0.3)
BILIRUB INDIRECT FLD-MCNC: 0.6 MG/DL — SIGNIFICANT CHANGE UP (ref 0.2–1)
BILIRUB SERPL-MCNC: 1.1 MG/DL — SIGNIFICANT CHANGE UP (ref 0.4–2)
BUN SERPL-MCNC: 14 MG/DL — SIGNIFICANT CHANGE UP (ref 8–20)
CALCIUM SERPL-MCNC: 9 MG/DL — SIGNIFICANT CHANGE UP (ref 8.6–10.2)
CHLORIDE SERPL-SCNC: 100 MMOL/L — SIGNIFICANT CHANGE UP (ref 98–107)
CO2 SERPL-SCNC: 22 MMOL/L — SIGNIFICANT CHANGE UP (ref 22–29)
CREAT SERPL-MCNC: 0.64 MG/DL — SIGNIFICANT CHANGE UP (ref 0.5–1.3)
GLUCOSE SERPL-MCNC: 85 MG/DL — SIGNIFICANT CHANGE UP (ref 70–99)
HCT VFR BLD CALC: 38.3 % — LOW (ref 39–50)
HGB BLD-MCNC: 12.6 G/DL — LOW (ref 13–17)
LIDOCAIN IGE QN: 66 U/L — HIGH (ref 22–51)
MAGNESIUM SERPL-MCNC: 2.1 MG/DL — SIGNIFICANT CHANGE UP (ref 1.6–2.6)
MCHC RBC-ENTMCNC: 29 PG — SIGNIFICANT CHANGE UP (ref 27–34)
MCHC RBC-ENTMCNC: 32.9 GM/DL — SIGNIFICANT CHANGE UP (ref 32–36)
MCV RBC AUTO: 88 FL — SIGNIFICANT CHANGE UP (ref 80–100)
PHOSPHATE SERPL-MCNC: 1.9 MG/DL — LOW (ref 2.4–4.7)
PLATELET # BLD AUTO: 190 K/UL — SIGNIFICANT CHANGE UP (ref 150–400)
POTASSIUM SERPL-MCNC: 4.1 MMOL/L — SIGNIFICANT CHANGE UP (ref 3.5–5.3)
POTASSIUM SERPL-SCNC: 4.1 MMOL/L — SIGNIFICANT CHANGE UP (ref 3.5–5.3)
PROT SERPL-MCNC: 6.6 G/DL — SIGNIFICANT CHANGE UP (ref 6.6–8.7)
RBC # BLD: 4.35 M/UL — SIGNIFICANT CHANGE UP (ref 4.2–5.8)
RBC # FLD: 13.9 % — SIGNIFICANT CHANGE UP (ref 10.3–14.5)
SODIUM SERPL-SCNC: 138 MMOL/L — SIGNIFICANT CHANGE UP (ref 135–145)
WBC # BLD: 7.12 K/UL — SIGNIFICANT CHANGE UP (ref 3.8–10.5)
WBC # FLD AUTO: 7.12 K/UL — SIGNIFICANT CHANGE UP (ref 3.8–10.5)

## 2020-05-27 PROCEDURE — 47562 LAPAROSCOPIC CHOLECYSTECTOMY: CPT

## 2020-05-27 PROCEDURE — 99232 SBSQ HOSP IP/OBS MODERATE 35: CPT

## 2020-05-27 PROCEDURE — 88304 TISSUE EXAM BY PATHOLOGIST: CPT | Mod: 26

## 2020-05-27 PROCEDURE — 99232 SBSQ HOSP IP/OBS MODERATE 35: CPT | Mod: 57

## 2020-05-27 RX ORDER — OXYCODONE HYDROCHLORIDE 5 MG/1
5 TABLET ORAL EVERY 4 HOURS
Refills: 0 | Status: DISCONTINUED | OUTPATIENT
Start: 2020-05-27 | End: 2020-05-27

## 2020-05-27 RX ORDER — ONDANSETRON 8 MG/1
4 TABLET, FILM COATED ORAL ONCE
Refills: 0 | Status: DISCONTINUED | OUTPATIENT
Start: 2020-05-27 | End: 2020-05-27

## 2020-05-27 RX ORDER — IBUPROFEN 200 MG
600 TABLET ORAL EVERY 6 HOURS
Refills: 0 | Status: DISCONTINUED | OUTPATIENT
Start: 2020-05-27 | End: 2020-05-27

## 2020-05-27 RX ORDER — SODIUM CHLORIDE 9 MG/ML
1000 INJECTION, SOLUTION INTRAVENOUS
Refills: 0 | Status: DISCONTINUED | OUTPATIENT
Start: 2020-05-27 | End: 2020-05-27

## 2020-05-27 RX ORDER — ACETAMINOPHEN 500 MG
975 TABLET ORAL ONCE
Refills: 0 | Status: COMPLETED | OUTPATIENT
Start: 2020-05-27 | End: 2020-05-27

## 2020-05-27 RX ORDER — FENTANYL CITRATE 50 UG/ML
25 INJECTION INTRAVENOUS
Refills: 0 | Status: DISCONTINUED | OUTPATIENT
Start: 2020-05-27 | End: 2020-05-27

## 2020-05-27 RX ADMIN — Medication 975 MILLIGRAM(S): at 11:40

## 2020-05-27 RX ADMIN — OXYCODONE HYDROCHLORIDE 5 MILLIGRAM(S): 5 TABLET ORAL at 14:14

## 2020-05-27 RX ADMIN — Medication 650 MILLIGRAM(S): at 00:19

## 2020-05-27 RX ADMIN — FENTANYL CITRATE 25 MICROGRAM(S): 50 INJECTION INTRAVENOUS at 14:00

## 2020-05-27 NOTE — PROGRESS NOTE ADULT - PROBLEM SELECTOR PLAN 1
quickly resolving and now asymptomatic. Probable recurrant passage of small stones or sludge. For choly today.

## 2020-05-27 NOTE — PROGRESS NOTE ADULT - ASSESSMENT
70 yo M presents with acute interstitial pancreatitis, likely secondary to gallstone disease. Patient with known history of gallstone pancreatitis and presents with similar symptoms, elevated lipase, and US GB with gallstones and sludge. MRCP negative.     - NPO/IVF  - serial abdominal exams  - GI no acute treatment, no ERCP needed.   - plan for lap rodriguez today in the AM  - COVID is negative.   - Preoped.   - Pain management.   - LOV ppx.   - POC 4h after the OR.     DISPO: pending OR today lap rodriguez.

## 2020-05-27 NOTE — PROGRESS NOTE ADULT - ATTENDING COMMENTS
Patient seen and examined with surgery team. Above note reviewed and edited where appropriate    Plan for laparoscopic cholecystectomy  Risks/benefits discussed. Patient understands, agrees, and wishes to proceed.

## 2020-05-27 NOTE — ASU DISCHARGE PLAN (ADULT/PEDIATRIC) - CALL YOUR DOCTOR IF YOU HAVE ANY OF THE FOLLOWING:
Inability to tolerate liquids or foods/Nausea and vomiting that does not stop/Swelling that gets worse/Fever greater than (need to indicate Fahrenheit or Celsius)/Bleeding that does not stop/Pain not relieved by Medications/Wound/Surgical Site with redness, or foul smelling discharge or pus

## 2020-05-27 NOTE — PROGRESS NOTE ADULT - SUBJECTIVE AND OBJECTIVE BOX
HPI/OVERNIGHT EVENTS:    Pt examimed at bedside complaining of abdominal pain. ROCHELLE, no complains. Pt going for lap rodriguez today. Pt denies chest pain, SOB, n/v/f/c.     MEDICATIONS  (STANDING):  enoxaparin Injectable 40 milliGRAM(s) SubCutaneous daily  pantoprazole  Injectable 40 milliGRAM(s) IV Push daily  sodium chloride 0.9%. 1000 milliLiter(s) (125 mL/Hr) IV Continuous <Continuous>    MEDICATIONS  (PRN):  acetaminophen   Tablet .. 650 milliGRAM(s) Oral every 6 hours PRN Mild Pain (1 - 3)  ondansetron Injectable 4 milliGRAM(s) IV Push every 8 hours PRN Nausea and/or Vomiting      Vital Signs Last 24 Hrs  T(C): 37 (26 May 2020 23:20), Max: 37.2 (26 May 2020 12:02)  T(F): 98.6 (26 May 2020 23:20), Max: 98.9 (26 May 2020 12:02)  HR: 61 (26 May 2020 23:20) (53 - 69)  BP: 153/66 (26 May 2020 23:20) (153/66 - 170/76)  BP(mean): --  RR: 18 (26 May 2020 23:20) (17 - 18)  SpO2: 94% (26 May 2020 23:20) (94% - 97%)    Gen: well appearing male, NAD  	Neuro: AOx3, EOMI, PERRLA, no gross deficit on exam  	HEENT: No oral/mucosal lesions, no neck masses or lymphadenopathy  	RESP: CTABL, nonlabored breathing  	CVS: RRR no gallops murmurs or rubs, normal s1, s2  	GI: abd soft, tender to the epigastrum and LUQ, ND, no rebound/guarding    Extremities: 2+ peripheral pulses      I&O's Detail      LABS:                        12.5   7.71  )-----------( 199      ( 26 May 2020 08:03 )             38.1         138  |  100  |  16.0  ----------------------------<  90  3.7   |  26.0  |  0.74    Ca    9.0      26 May 2020 08:03  Phos  1.8     -  Mg     1.9         TPro  6.3<L>  /  Alb  3.4  /  TBili  1.6  /  DBili  0.8<H>  /  AST  295<H>  /  ALT  488<H>  /  AlkPhos  148<H>      PT/INR - ( 25 May 2020 20:00 )   PT: 12.0 sec;   INR: 1.06 ratio         PTT - ( 25 May 2020 20:00 )  PTT:28.0 sec  Urinalysis Basic - ( 25 May 2020 23:32 )    Color: Giana / Appearance: Clear / S.020 / pH: x  Gluc: x / Ketone: Large  / Bili: Moderate / Urobili: 4 mg/dL   Blood: x / Protein: 30 mg/dL / Nitrite: Negative   Leuk Esterase: Trace / RBC: 0-2 /HPF / WBC 0-2   Sq Epi: x / Non Sq Epi: Occasional / Bacteria: Occasional

## 2020-05-27 NOTE — ASU DISCHARGE PLAN (ADULT/PEDIATRIC) - CARE PROVIDER_API CALL
Josiah Paulino)  Surgery  Bariatric  [address] [place] [address] [id number]  Phone: [phone]  Fax: [phone]  Follow Up Time: 2 weeks

## 2020-05-27 NOTE — PROGRESS NOTE ADULT - SUBJECTIVE AND OBJECTIVE BOX
Pt seen and examined f/u fro recurrent biliary pancreatitis and elevated LFTs    This morning he continues with no abdominal pain, nausea or vomiting.RCP showed no CBD stone but with changes c/w pancreatitis.esterday bili back down ot normal and other LFTs trending down. amylase and lipase down.    REVIEW OF SYSTEMS:    CONSTITUTIONAL: No fever, weight loss, or fatigue  EYES: No eye pain, visual disturbances, or discharge  ENMT:  No difficulty hearing, tinnitus, vertigo; No sinus or throat pain  RESPIRATORY: No cough, wheezing, chills or hemoptysis; No shortness of breath  CARDIOVASCULAR: No chest pain, palpitations, dizziness, or leg swelling  GASTROINTESTINAL: No abdominal or epigastric pain. No nausea, vomiting, or hematemesis; No diarrhea or constipation. No melena or hematochezia.    MEDICATIONS:  MEDICATIONS  (STANDING):  enoxaparin Injectable 40 milliGRAM(s) SubCutaneous daily  pantoprazole  Injectable 40 milliGRAM(s) IV Push daily  sodium chloride 0.9%. 1000 milliLiter(s) (125 mL/Hr) IV Continuous <Continuous>    MEDICATIONS  (PRN):  acetaminophen   Tablet .. 650 milliGRAM(s) Oral every 6 hours PRN Mild Pain (1 - 3)  ondansetron Injectable 4 milliGRAM(s) IV Push every 8 hours PRN Nausea and/or Vomiting      Allergies    No Known Allergies    Intolerances        Vital Signs Last 24 Hrs  T(C): 37 (27 May 2020 03:49), Max: 37.2 (26 May 2020 12:02)  T(F): 98.6 (27 May 2020 03:49), Max: 98.9 (26 May 2020 12:02)  HR: 60 (27 May 2020 03:49) (60 - 69)  BP: 170/80 (27 May 2020 03:49) (153/66 - 170/80)  BP(mean): --  RR: 18 (27 May 2020 03:49) (17 - 18)  SpO2: 97% (27 May 2020 03:49) (94% - 97%)      PHYSICAL EXAM:    General: Well developed; well nourished; in no acute distress  HEENT: MMM, conjunctiva and sclera clear  Gastrointestinal:Abdomen: Soft non-tender non-distended; Normal bowel sounds; No hepatosplenomegaly  Extremities: no cyanosis, clubbing or edema.  Skin: Warm and dry. No obvious rash    LABS:      CBC Full  -  ( 26 May 2020 08:03 )  WBC Count : 7.71 K/uL  RBC Count : 4.32 M/uL  Hemoglobin : 12.5 g/dL  Hematocrit : 38.1 %  Platelet Count - Automated : 199 K/uL  Mean Cell Volume : 88.2 fl  Mean Cell Hemoglobin : 28.9 pg  Mean Cell Hemoglobin Concentration : 32.8 gm/dL  Auto Neutrophil # : 5.49 K/uL  Auto Lymphocyte # : 1.29 K/uL  Auto Monocyte # : 0.51 K/uL  Auto Eosinophil # : 0.35 K/uL  Auto Basophil # : 0.03 K/uL  Auto Neutrophil % : 71.3 %  Auto Lymphocyte % : 16.7 %  Auto Monocyte % : 6.6 %  Auto Eosinophil % : 4.5 %  Auto Basophil % : 0.4 %        138  |  100  |  16.0  ----------------------------<  90  3.7   |  26.0  |  0.74    Ca    9.0      26 May 2020 08:03  Phos  1.8       Mg     1.9         TPro  6.3<L>  /  Alb  3.4  /  TBili  1.6  /  DBili  0.8<H>  /  AST  295<H>  /  ALT  488<H>  /  AlkPhos  148<H>      PT/INR - ( 25 May 2020 20:00 )   PT: 12.0 sec;   INR: 1.06 ratio         PTT - ( 25 May 2020 20:00 )  PTT:28.0 sec      Urinalysis Basic - ( 25 May 2020 23:32 )    Color: Giana / Appearance: Clear / S.020 / pH: x  Gluc: x / Ketone: Large  / Bili: Moderate / Urobili: 4 mg/dL   Blood: x / Protein: 30 mg/dL / Nitrite: Negative   Leuk Esterase: Trace / RBC: 0-2 /HPF / WBC 0-2   Sq Epi: x / Non Sq Epi: Occasional / Bacteria: Occasional                RADIOLOGY & ADDITIONAL STUDIES (The following images were personally reviewed):  < from: MR MRCP w/wo IV Cont (20 @ 23:04) >  EXAM:  MR MRCP [facility] IC                          PROCEDURE DATE:  2020          INTERPRETATION:  CLINICAL INFORMATION: Gallstone pancreatitis.    COMPARISON: CT abdomen/pelvis 5/3/2020 and 2020, ultrasound 2020, and MRI abdomen 2020    PROCEDURE:   MRI of the abdomen was performed with and without intravenous contrast.  IV Contrast: Gadavist. 9 cc administered.  MRCP was performed.    FINDINGS:  LOWER CHEST: Trace bilateral pleural effusions.    LIVER: 1.1 cm arterial enhancing focus in segment 7 (series 801 image 30), likely vascular shunting, unchanged. Fatty infiltration.   BILE DUCTS: Normal caliber. Common bile duct measures 6 mm. No common bile duct stone.  GALLBLADDER: Cholelithiasis. Mildly distended.  SPLEEN: Within normal limits.  PANCREAS: Peripancreatic edema. Collection at the superior aspect of the pancreas measuring 2.8 x 1.7 cm (series 800 image 45) a small portion of which extends inferiorly demonstrating high T1 signal consistent with hemorrhagic content. Restricted diffusion throughout the pancreas, likely secondary to underlying pancreatitis. Stable 5 mm cystic lesion in the pancreatic body (series 6 image 19). There are additional scattered subcentimeter high T2 signal foci in the pancreas, either dilated pancreatic ductal side branches additional small cystic lesions, better seen on the current study. Main pancreatic duct within normal limits for caliber measuring 2-3 mm. Focal area of nonenhancement at the posterior aspect of the pancreatic body (series 802 image 60) measuring 9 mm (also seen on series 9 image 14), possibly related to adjacent peripancreatic fat, unlikely pancreatic necrosis.  ADRENALS: Within normal limits.  KIDNEYS/URETERS: No hydronephrosis. Bilateral parapelvic renal cysts and cortical cysts in the right kidney measuring up to 2.2 cm in the mid kidney. Punctate high T1 signal in the upper pole of the left kidney, likely a cyst with hemorrhagic or proteinaceous content.    VISUALIZED PORTIONS:  BOWEL: Normal caliber.   PERITONEUM: No ascites.  VESSELS: Abdominal aorta normal in caliber. IVC and hepatic veins are patent. Portal, splenic, and superior mesenteric veins are patent. Narrowing at the origin of the celiac artery.  RETROPERITONEUM/LYMPH NODES: Nolymphadenopathy.    ABDOMINAL WALL: Unremarkable.  BONES: No aggressive osseous lesion.    IMPRESSION:   1.  No biliary ductal dilatation or common bile duct stone.    2.  Cholelithiasis.    3.  Pancreatitis with a hemorrhagic fluid collection at thesuperior aspect of the pancreas measuring 2.8 cm.    4.  Stable 5 mm cystic lesion in the pancreatic body since 2020 with additional smaller cystic foci in the pancreas, better seen on the current study, either dilated pancreatic side branches or additional small cystic lesions.                  DUKE SPENCER M.D., ATTENDING RADIOLOGIST  This document has been electronically signed. May 26 2020 10:14AM                  < end of copied text >

## 2020-05-29 ENCOUNTER — NON-APPOINTMENT (OUTPATIENT)
Age: 70
End: 2020-05-29

## 2020-05-29 ENCOUNTER — APPOINTMENT (OUTPATIENT)
Dept: CARDIOLOGY | Facility: CLINIC | Age: 70
End: 2020-05-29
Payer: MEDICARE

## 2020-05-29 VITALS
HEART RATE: 75 BPM | OXYGEN SATURATION: 96 % | WEIGHT: 204 LBS | DIASTOLIC BLOOD PRESSURE: 58 MMHG | HEIGHT: 71 IN | BODY MASS INDEX: 28.56 KG/M2 | SYSTOLIC BLOOD PRESSURE: 148 MMHG

## 2020-05-29 DIAGNOSIS — H90.2 CONDUCTIVE HEARING LOSS, UNSPECIFIED: ICD-10-CM

## 2020-05-29 LAB — SURGICAL PATHOLOGY STUDY: SIGNIFICANT CHANGE UP

## 2020-05-29 PROCEDURE — 99214 OFFICE O/P EST MOD 30 MIN: CPT

## 2020-05-29 RX ORDER — AMOXICILLIN 500 MG/1
500 CAPSULE ORAL
Qty: 16 | Refills: 0 | Status: ACTIVE | COMMUNITY
Start: 2020-01-06

## 2020-05-29 RX ORDER — AMLODIPINE BESYLATE 5 MG/1
5 TABLET ORAL DAILY
Qty: 90 | Refills: 0 | Status: DISCONTINUED | COMMUNITY
Start: 2018-12-11 | End: 2020-05-29

## 2020-06-02 PROCEDURE — 85610 PROTHROMBIN TIME: CPT

## 2020-06-02 PROCEDURE — 36415 COLL VENOUS BLD VENIPUNCTURE: CPT

## 2020-06-02 PROCEDURE — 81001 URINALYSIS AUTO W/SCOPE: CPT

## 2020-06-02 PROCEDURE — 83735 ASSAY OF MAGNESIUM: CPT

## 2020-06-02 PROCEDURE — 99285 EMERGENCY DEPT VISIT HI MDM: CPT

## 2020-06-02 PROCEDURE — 88304 TISSUE EXAM BY PATHOLOGIST: CPT

## 2020-06-02 PROCEDURE — U0003: CPT

## 2020-06-02 PROCEDURE — 85730 THROMBOPLASTIN TIME PARTIAL: CPT

## 2020-06-02 PROCEDURE — 84484 ASSAY OF TROPONIN QUANT: CPT

## 2020-06-02 PROCEDURE — 84100 ASSAY OF PHOSPHORUS: CPT

## 2020-06-02 PROCEDURE — 82150 ASSAY OF AMYLASE: CPT

## 2020-06-02 PROCEDURE — 71045 X-RAY EXAM CHEST 1 VIEW: CPT

## 2020-06-02 PROCEDURE — 86850 RBC ANTIBODY SCREEN: CPT

## 2020-06-02 PROCEDURE — 80048 BASIC METABOLIC PNL TOTAL CA: CPT

## 2020-06-02 PROCEDURE — 83615 LACTATE (LD) (LDH) ENZYME: CPT

## 2020-06-02 PROCEDURE — 93005 ELECTROCARDIOGRAM TRACING: CPT

## 2020-06-02 PROCEDURE — 80076 HEPATIC FUNCTION PANEL: CPT

## 2020-06-02 PROCEDURE — 86900 BLOOD TYPING SEROLOGIC ABO: CPT

## 2020-06-02 PROCEDURE — 80053 COMPREHEN METABOLIC PANEL: CPT

## 2020-06-02 PROCEDURE — 74183 MRI ABD W/O CNTR FLWD CNTR: CPT

## 2020-06-02 PROCEDURE — 83690 ASSAY OF LIPASE: CPT

## 2020-06-02 PROCEDURE — 86901 BLOOD TYPING SEROLOGIC RH(D): CPT

## 2020-06-02 PROCEDURE — 85027 COMPLETE CBC AUTOMATED: CPT

## 2020-06-02 NOTE — ASSESSMENT
[FreeTextEntry1] : Plan:\par \par -Asymptomatic bradycardia.  Heart rate has improved.  He has frequent PVCs.  Check Holter recording.  KG was reviewed.  Patient is not on any AV blocking meds.  No history or clinical evidence of hypothyroidism.  Patient afebrile.\par -Hypertension: Continue current medications.  Check magnesium and potassium level.  He did have supplements of these during his recent hospitalization.\par -CAD: History of 50% LAD lesion, less than 50% RCA and circumflex disease on a CT scan.  And asymptomatic for CAD.  Further evaluation with echocardiogram and coronary perfusion with Lexiscan stress test should be performed in future.\par -Hypercholesterolemia: The patient feels that his recent lipid profile was adequate.  We will try to obtain a copy of this.  Unless his LFTs are abnormal or there is other contraindication of statins, it is indicated.  Patient understands this\par \par Holter recording should be started.  Quantify frequency of PVCs.\par \par Follow-up in 2 weeks.  He will Call for the results of his lab tests.\par \par Thank you for this referral and allowing me to participate in the care of this patient.  If can be of any further help or  if you have any questions, please do not hesitate to contact me\par \par \par Sincerely,\par \par Duane Currie MD, FACC, ARIEL\par

## 2020-06-02 NOTE — PHYSICAL EXAM
[General Appearance - Well Developed] : well developed [Well Groomed] : well groomed [Normal Appearance] : normal appearance [General Appearance - Well Nourished] : well nourished [No Deformities] : no deformities [General Appearance - In No Acute Distress] : no acute distress [Exaggerated Use Of Accessory Muscles For Inspiration] : no accessory muscle use [Respiration, Rhythm And Depth] : normal respiratory rhythm and effort [Heart Sounds] : normal S1 and S2 [Edema] : no peripheral edema present [Abnormal Walk] : normal gait [Nail Clubbing] : no clubbing of the fingernails [Cyanosis, Localized] : no localized cyanosis [Skin Color & Pigmentation] : normal skin color and pigmentation [Skin Turgor] : normal skin turgor [] : no rash [Affect] : the affect was normal [Oriented To Time, Place, And Person] : oriented to person, place, and time [Mood] : the mood was normal [No Anxiety] : not feeling anxious [Memory Recent] : recent memory was not impaired [FreeTextEntry1] : Healed scar right upper quadrant.  Nondistended.

## 2020-06-02 NOTE — REASON FOR VISIT
[FreeTextEntry1] : Mr. Browne is a 69 -year-old man who walked into the office today from urology office for bradycardia.  He is asymptomatic for it.\par \par He had urinary retention and underwent urinary catheterization with Dr. Bah who noticed heart rate of around 50 and sent the patient over.\par \par He recently underwent cholecystectomy at [facility].  During hospitalization, he was given magnesium and potassium supplements.  The patient he is edabychlor-HCTZ  for blood pressure control as outpatient.\par \par He denies chest pain, sob, palpitaitons, dizziness, syncope, orthopnea and PND. \par  \par As you know he is a history of hypertension, nonobstructive CAD by CTA putting 50% LAD lesion.  History of hypercholesterolemia.  Not on statins.  In the past, LFTs were abnormal?  His LDL was 164 in 2016.  I do not have any recent lipid profile.\par \par Reviewed labs/tests\par \par Echocardiogram 7-19-16 60% with normal ventricular function, mild diastolic dysfunction, mild LAE, aneurysmal interatrial septum, descending aorta 4.0, trace to mild MR/TR\par \par Carotid ultrasound 7-19-16 mild nonobstructive disease bilaterally\par \par CT chest 6-11-15 calcium score 1256, LAD 50%, left circumflex  less than 50% and RCA less than 50%\par \par EKG 11-15-18 sinus bradycardia with LAD, LAHB, IVCD, PRP and nonspecific ST segment changes\par \par Labs 11-16-18 White blood cell 5.0, hemoglobin 14, hematocrit 42.4, platelets 208, d-dimer less than 200, sodium 142, potassium 4.0, BUN 16, creatinine 0.8, , , magnesium 2.0, amylase 72, lipase 42\par \par Abdominal ultrasound 11-16-18 cholelithiasis with borderline wall thickening of the gallbladder raising suspicion for cholecystitis\par \par HIDA scan 11-16-18 unremarkable

## 2020-06-02 NOTE — REVIEW OF SYSTEMS
[Chest Pain] : no chest pain [Shortness Of Breath] : no shortness of breath [Dizziness] : no dizziness [FreeTextEntry1] : Mild soreness right upper quadrant

## 2020-06-04 PROCEDURE — 93224 XTRNL ECG REC UP TO 48 HRS: CPT

## 2020-06-09 ENCOUNTER — APPOINTMENT (OUTPATIENT)
Dept: CARDIOLOGY | Facility: CLINIC | Age: 70
End: 2020-06-09
Payer: MEDICARE

## 2020-06-09 VITALS
HEIGHT: 71 IN | TEMPERATURE: 99.6 F | DIASTOLIC BLOOD PRESSURE: 62 MMHG | BODY MASS INDEX: 27.3 KG/M2 | OXYGEN SATURATION: 96 % | SYSTOLIC BLOOD PRESSURE: 132 MMHG | WEIGHT: 195 LBS | HEART RATE: 63 BPM

## 2020-06-09 DIAGNOSIS — E78.00 PURE HYPERCHOLESTEROLEMIA, UNSPECIFIED: ICD-10-CM

## 2020-06-09 PROCEDURE — 99214 OFFICE O/P EST MOD 30 MIN: CPT

## 2020-06-09 RX ORDER — POTASSIUM CHLORIDE 600 MG/1
8 TABLET, FILM COATED, EXTENDED RELEASE ORAL
Qty: 45 | Refills: 1 | Status: ACTIVE | COMMUNITY
Start: 2020-06-09 | End: 1900-01-01

## 2020-06-09 NOTE — ASSESSMENT
[FreeTextEntry1] : Plan:\par \par -Heart rate has improved.  He has frequent PVCs. >28% for 24 hours, mostly asymptomatic.  I have given him potassium supplementation.  Continue current antihypertensive medication that includes HCTZ.  Magnesium level is fine.  EP consultation should be obtained.  Limited echo for LVEF estimation.  In January 2020, his echocardiogram was unremarkable.\par -Hypertension: Continue current medications.  When he stopped his ARB–HCTZ, his blood pressures went up to 170/90 mmHg.\par -CAD: History of 50% LAD lesion, less than 50% RCA and circumflex disease on a CT scan.  And asymptomatic for CAD. Coronary perfusion with Lexiscan stress test can be considered if there is no other etiology of frequent PVCs and they do not respond to potassium supplementation.  Patient with recent history of bradycardia, difficult to give beta-blockers.\par -Hypercholesterolemia: The patient feels that his recent lipid profile was adequate.  We will try to obtain a copy of this.  Unless his LFTs are abnormal or there is other contraindication of statins, it is indicated.  Patient understands this\par \par Follow-up after EP consultation\par \par Thank you for this referral and allowing me to participate in the care of this patient.  If can be of any further help or  if you have any questions, please do not hesitate to contact me\par \par \par Sincerely,\par \par Duane Currie MD, FACC, ARIEL\par

## 2020-06-09 NOTE — REVIEW OF SYSTEMS
[Shortness Of Breath] : no shortness of breath [Chest Pain] : no chest pain [Dizziness] : no dizziness [FreeTextEntry1] : Mild soreness right upper quadrant

## 2020-06-09 NOTE — PHYSICAL EXAM
[General Appearance - Well Developed] : well developed [Normal Appearance] : normal appearance [Well Groomed] : well groomed [General Appearance - Well Nourished] : well nourished [General Appearance - In No Acute Distress] : no acute distress [No Deformities] : no deformities [Exaggerated Use Of Accessory Muscles For Inspiration] : no accessory muscle use [Respiration, Rhythm And Depth] : normal respiratory rhythm and effort [Heart Sounds] : normal S1 and S2 [Edema] : no peripheral edema present [FreeTextEntry1] : soft kenzie.  Frequent PVCs.  Regular S1 and S2 [Abnormal Walk] : normal gait [Cyanosis, Localized] : no localized cyanosis [Nail Clubbing] : no clubbing of the fingernails [Skin Color & Pigmentation] : normal skin color and pigmentation [Skin Turgor] : normal skin turgor [Oriented To Time, Place, And Person] : oriented to person, place, and time [] : no rash [Affect] : the affect was normal [Mood] : the mood was normal [Memory Recent] : recent memory was not impaired [No Anxiety] : not feeling anxious

## 2020-06-09 NOTE — REASON FOR VISIT
[FreeTextEntry1] : Mr. Browne is a 69 -year-old man who was noted to have asymptomatic bradycardia in Dr. Bah  office\par \par He had urinary retention and underwent urinary catheterization with Dr. Bah who noticed heart rate of around 50 and sent the patient over.  EKG showed PVCs.  Holter recording showed more than 24K PVCs ( > 28%); mostly asymptomatic.  Magnesium was normal.  Potassium was borderline low at 3.7.  Normal TSH.\par \par Also, he recently underwent cholecystectomy at [facility].  During hospitalization, he was given magnesium and potassium supplements.  The patient is on  edabychlor-HCTZ  for blood pressure control as outpatient.\par \par He denies chest pain, sob, palpitaitons, dizziness, syncope, orthopnea and PND. \par  \par As you know he is a history of hypertension, nonobstructive CAD by CTA putting 50% LAD lesion.  History of hypercholesterolemia.  Not on statins.  In the past, LFTs were abnormal?  His LDL was 164 in 2016.  I do not have any recent lipid profile.\par \par Reviewed labs/tests\par \par Echocardiogram 7-19-16 60% with normal ventricular function, mild diastolic dysfunction, mild LAE, aneurysmal interatrial septum, descending aorta 4.0, trace to mild MR/TR\par \par Carotid ultrasound 7-19-16 mild nonobstructive disease bilaterally\par \par CT chest 6-11-15 calcium score 1256, LAD 50%, left circumflex  less than 50% and RCA less than 50%\par \par EKG 11-15-18 sinus bradycardia with LAD, LAHB, IVCD, PRP and nonspecific ST segment changes\par \par Labs 11-16-18 White blood cell 5.0, hemoglobin 14, hematocrit 42.4, platelets 208, d-dimer less than 200, sodium 142, potassium 4.0, BUN 16, creatinine 0.8, , , magnesium 2.0, amylase 72, lipase 42\par \par Abdominal ultrasound 11-16-18 cholelithiasis with borderline wall thickening of the gallbladder raising suspicion for cholecystitis\par \par HIDA scan 11-16-18 unremarkable\par \par Holter recording May 2020: Frequent PVCs; more than 24K PVCs ( > 28%); mostly asymptomatic

## 2020-06-11 ENCOUNTER — APPOINTMENT (OUTPATIENT)
Dept: SURGERY | Facility: CLINIC | Age: 70
End: 2020-06-11
Payer: MEDICARE

## 2020-06-11 VITALS
OXYGEN SATURATION: 99 % | DIASTOLIC BLOOD PRESSURE: 71 MMHG | BODY MASS INDEX: 28 KG/M2 | HEART RATE: 56 BPM | HEIGHT: 71 IN | SYSTOLIC BLOOD PRESSURE: 120 MMHG | WEIGHT: 200 LBS | TEMPERATURE: 98.2 F

## 2020-06-11 PROCEDURE — 99024 POSTOP FOLLOW-UP VISIT: CPT

## 2020-06-11 NOTE — PHYSICAL EXAM
[JVD] : no jugular venous distention  [No Rash or Lesion] : No rash or lesion [Oriented to Person] : oriented to person [Alert] : alert [Oriented to Place] : oriented to place [Calm] : calm [de-identified] : No acute distress [Oriented to Time] : oriented to time [de-identified] : Regular rate [de-identified] : soft, nontender. no rebound or guarding. incisions c/d/i [de-identified] : normal range of motion [de-identified] : No respiratory distress

## 2020-06-11 NOTE — ASSESSMENT
[FreeTextEntry1] : 69 year old man with gallstone pancreatitis s/p lap cholecystectomy, doing well\par

## 2020-06-11 NOTE — HISTORY OF PRESENT ILLNESS
[de-identified] : Mr. OLMSTEAD is a 69 year old man who presented with gallstone pancreatitis s/p laparoscopic cholecystectomy who comes in today for postop visit. He is doing well. Denies pain. Denies fever/chills. No redness or pain at incision sites. Tolerating diet. Normal bowel movements.\par \par Pathology reviewed

## 2020-06-17 ENCOUNTER — APPOINTMENT (OUTPATIENT)
Dept: CARDIOLOGY | Facility: CLINIC | Age: 70
End: 2020-06-17
Payer: MEDICARE

## 2020-06-17 PROCEDURE — 93308 TTE F-UP OR LMTD: CPT

## 2020-06-18 ENCOUNTER — APPOINTMENT (OUTPATIENT)
Dept: CARDIOLOGY | Facility: CLINIC | Age: 70
End: 2020-06-18
Payer: MEDICARE

## 2020-06-18 PROCEDURE — 99442: CPT | Mod: 95

## 2020-07-01 ENCOUNTER — APPOINTMENT (OUTPATIENT)
Dept: ELECTROPHYSIOLOGY | Facility: CLINIC | Age: 70
End: 2020-07-01
Payer: MEDICARE

## 2020-07-01 ENCOUNTER — NON-APPOINTMENT (OUTPATIENT)
Age: 70
End: 2020-07-01

## 2020-07-01 VITALS
HEART RATE: 68 BPM | OXYGEN SATURATION: 98 % | DIASTOLIC BLOOD PRESSURE: 94 MMHG | BODY MASS INDEX: 28.42 KG/M2 | HEIGHT: 71 IN | SYSTOLIC BLOOD PRESSURE: 152 MMHG | WEIGHT: 203 LBS

## 2020-07-01 DIAGNOSIS — I10 ESSENTIAL (PRIMARY) HYPERTENSION: ICD-10-CM

## 2020-07-01 DIAGNOSIS — I49.3 VENTRICULAR PREMATURE DEPOLARIZATION: ICD-10-CM

## 2020-07-01 PROCEDURE — 93000 ELECTROCARDIOGRAM COMPLETE: CPT

## 2020-07-01 PROCEDURE — 99204 OFFICE O/P NEW MOD 45 MIN: CPT

## 2020-07-01 RX ORDER — TRAMADOL HYDROCHLORIDE 50 MG/1
50 TABLET, COATED ORAL
Qty: 12 | Refills: 0 | Status: DISCONTINUED | COMMUNITY
Start: 2020-05-05 | End: 2020-07-01

## 2020-07-01 RX ORDER — PANTOPRAZOLE 40 MG/1
40 TABLET, DELAYED RELEASE ORAL
Qty: 30 | Refills: 0 | Status: DISCONTINUED | COMMUNITY
Start: 2020-04-30 | End: 2020-07-01

## 2020-07-08 ENCOUNTER — APPOINTMENT (OUTPATIENT)
Dept: CARDIOLOGY | Facility: CLINIC | Age: 70
End: 2020-07-08

## 2020-07-09 ENCOUNTER — APPOINTMENT (OUTPATIENT)
Dept: CARDIOLOGY | Facility: CLINIC | Age: 70
End: 2020-07-09
Payer: MEDICARE

## 2020-07-09 DIAGNOSIS — I25.10 ATHEROSCLEROTIC HEART DISEASE OF NATIVE CORONARY ARTERY W/OUT ANGINA PECTORIS: ICD-10-CM

## 2020-07-09 PROCEDURE — 93015 CV STRESS TEST SUPVJ I&R: CPT

## 2020-07-12 PROBLEM — I49.3 FREQUENT PVCS: Status: ACTIVE | Noted: 2020-06-09

## 2020-07-12 PROBLEM — I10 BENIGN ESSENTIAL HYPERTENSION: Status: ACTIVE | Noted: 2018-12-11

## 2020-07-12 NOTE — PHYSICAL EXAM
[Normal Appearance] : normal appearance [General Appearance - Well Developed] : well developed [No Oral Pallor] : no oral pallor [General Appearance - In No Acute Distress] : no acute distress [Normal Conjunctiva] : the conjunctiva exhibited no abnormalities [Normal Jugular Venous V Waves Present] : normal jugular venous V waves present [Heart Sounds] : normal S1 and S2 [Murmurs] : no murmurs present [Heart Rate And Rhythm] : heart rate and rhythm were normal [Arterial Pulses Normal] : the arterial pulses were normal [Respiration, Rhythm And Depth] : normal respiratory rhythm and effort [Edema] : no peripheral edema present [FreeTextEntry1] : Healed scar right upper quadrant.  Nondistended. [Abnormal Walk] : normal gait [Cyanosis, Localized] : no localized cyanosis [Nail Clubbing] : no clubbing of the fingernails [Affect] : the affect was normal [Impaired Insight] : insight and judgment were intact [] : no rash [Mood] : the mood was normal [Memory Recent] : recent memory was not impaired [No Anxiety] : not feeling anxious

## 2020-07-12 NOTE — HISTORY OF PRESENT ILLNESS
[FreeTextEntry1] : Patient is a 69-year-old man who is seen in evaluation for PVCs.\par \par The patient is not aware of the PVCs and is never had symptoms related to it.  He denied chest pain, shortness of breath, dizziness, lightheadedness, syncope, presyncope, orthopnea, PND or palpitations.\par \par He has a prior history of hypertension treated with meds and nonobstructive coronary artery disease by CTA.\par \par Echocardiogram: 6/17/2020: Ejection fraction 63%, mild left atrial enlargement with left atrial volume index 37 cc/m² mild diastolic dysfunction stage I, normal right ventricular systolic function, no pericardial effusion and normal pulmonary pressures.\par \par EKG from 5/29/2020 showed PVC with a right bundle morphology tall R wave in V2 to V6 and upright in leads II 3 and F, positive  in lead I and negative in aVR.\par \par Holter monitor from 5/29/2020 showed sinus rhythm with frequent PVCs, occasional ventricular couplets and ventricular bigeminy.  He had symptoms of cold sweats nausea dizziness tiredness which seem to correlated with the PVC.  On the Holter the PVC was mostly of similar morphology.\par  NOT AWARE OF THE pvc\par NO CAFFIENE SINCE FEB 2020

## 2020-07-12 NOTE — DISCUSSION/SUMMARY
[FreeTextEntry1] : Patient is a 69-year-old man with frequent PVCs noted on prior EKGs.  He had also frequent PVCs on Holter monitor not clear whether he was symptomatic.  He does not feel the PVCs.\par \par Previous echocardiogram had revealed normal ventricular function.  A stress test is pending.  He had previously had nonobstructive coronary disease on chest CTA.\par The PVC morphology is inferior axis and right bundle with positive concordance.  There is also positive in lead I.  This suggest a basal septal origin.  We will obtain a treadmill stress test to assess the effects of exercise of the PVC.  Would recommend a low-dose of a beta-blocker and reassess.  The patient has discontinued caffeine intake for the last several months.  We will do a follow-up evaluation after a stress test and consideration for low-dose beta-blocker.  If he still has a high PVC burden especially with symptoms we would discuss catheter ablation that time.\par \par The patient verbalized understanding of the recommendations and follow-up information.\par

## 2020-07-12 NOTE — REVIEW OF SYSTEMS
[Shortness Of Breath] : no shortness of breath [Feeling Fatigued] : feeling fatigued [Chest Pain] : no chest pain [Dyspnea on exertion] : not dyspnea during exertion [Dizziness] : no dizziness [Easy Bruising] : no tendency for easy bruising [Easy Bleeding] : no tendency for easy bleeding [Negative] : Eyes

## 2021-11-18 ENCOUNTER — EMERGENCY (EMERGENCY)
Facility: HOSPITAL | Age: 71
LOS: 1 days | End: 2021-11-18
Admitting: EMERGENCY MEDICINE
Payer: MEDICARE

## 2021-11-18 DIAGNOSIS — K64.9 UNSPECIFIED HEMORRHOIDS: Chronic | ICD-10-CM

## 2021-11-18 DIAGNOSIS — M51.9 UNSPECIFIED THORACIC, THORACOLUMBAR AND LUMBOSACRAL INTERVERTEBRAL DISC DISORDER: Chronic | ICD-10-CM

## 2021-11-18 PROCEDURE — 99283 EMERGENCY DEPT VISIT LOW MDM: CPT

## 2021-11-19 ENCOUNTER — APPOINTMENT (OUTPATIENT)
Dept: OPHTHALMOLOGY | Facility: CLINIC | Age: 71
End: 2021-11-19
Payer: MEDICARE

## 2021-11-19 ENCOUNTER — NON-APPOINTMENT (OUTPATIENT)
Age: 71
End: 2021-11-19

## 2021-11-19 PROCEDURE — 99203 OFFICE O/P NEW LOW 30 MIN: CPT

## 2021-12-10 ENCOUNTER — APPOINTMENT (OUTPATIENT)
Dept: OPHTHALMOLOGY | Facility: CLINIC | Age: 71
End: 2021-12-10
Payer: MEDICARE

## 2021-12-10 ENCOUNTER — NON-APPOINTMENT (OUTPATIENT)
Age: 71
End: 2021-12-10

## 2021-12-10 PROCEDURE — 99213 OFFICE O/P EST LOW 20 MIN: CPT

## 2022-07-05 ENCOUNTER — APPOINTMENT (OUTPATIENT)
Dept: CT IMAGING | Facility: CLINIC | Age: 72
End: 2022-07-05

## 2022-07-05 PROCEDURE — 74178 CT ABD&PLV WO CNTR FLWD CNTR: CPT | Mod: MH

## 2023-01-31 NOTE — ED ADULT NURSE NOTE - ED CARDIAC RATE
Transition of Care Plan: TBD pending medical progression  *may need therapy evaluation prior to discharge      RUR: 12% low     PCP F/U: Dr. Alejo Located within [facility]     Disposition: TBD pending medical progression     Transportation: family     Main Contact: Daughter: Shandra Lauth: [phone]: Patient off the floor. Will continue to follow for medical progression.      Catalina Eugene RN, CRM normal

## 2023-04-19 ENCOUNTER — APPOINTMENT (OUTPATIENT)
Dept: NEUROSURGERY | Facility: CLINIC | Age: 73
End: 2023-04-19
Payer: MEDICARE

## 2023-04-19 VITALS
SYSTOLIC BLOOD PRESSURE: 146 MMHG | DIASTOLIC BLOOD PRESSURE: 86 MMHG | HEIGHT: 71 IN | WEIGHT: 215 LBS | BODY MASS INDEX: 30.1 KG/M2

## 2023-04-19 DIAGNOSIS — Z98.890 OTHER SPECIFIED POSTPROCEDURAL STATES: ICD-10-CM

## 2023-04-19 DIAGNOSIS — M54.50 LOW BACK PAIN, UNSPECIFIED: ICD-10-CM

## 2023-04-19 PROCEDURE — 99204 OFFICE O/P NEW MOD 45 MIN: CPT

## 2023-04-19 NOTE — REASON FOR VISIT
[New Patient Visit] : a new patient visit [FreeTextEntry1] : Pt is here with complaints of lower back pain into R leg.

## 2023-04-19 NOTE — ASSESSMENT
[FreeTextEntry1] : 72 year old sony low back pain\par past history lumbar spine surgery\par Will attempt trial of physical therapy have provided a script to the patient\par recommend continue NSAIDs\par \par if not improved can obtain MRI\par \par

## 2023-04-19 NOTE — HISTORY OF PRESENT ILLNESS
[FreeTextEntry1] : Low back pain  [de-identified] : This is a 72-year-old male with a past history of a lumbar spine surgery 20 years ago which she does not recall where by whom or what it was except for L5-S1.  He states 2 weeks ago he was moving in his kitchen and developed low back pain.  Since then he has had some pain in his right leg as well.  The pain is somewhat improved with Advil.  Has been wearing a brace.  Denies any severe pain rating into the extremities denies any numbness tingling weakness of bladder bowel dysfunction.  Denies any relieving bleeding or aggravating factors.  He has not had any therapy.  He has no imaging

## 2024-01-03 NOTE — ED PROVIDER NOTE - NEURO NEGATIVE STATEMENT, MLM
no loss of consciousness, no gait abnormality, no headache, no sensory deficits, and no weakness.
1 = Total assistance

## 2024-02-09 ENCOUNTER — APPOINTMENT (OUTPATIENT)
Dept: OPHTHALMOLOGY | Facility: CLINIC | Age: 74
End: 2024-02-09
Payer: MEDICARE

## 2024-02-09 ENCOUNTER — APPOINTMENT (OUTPATIENT)
Dept: OPHTHALMOLOGY | Facility: CLINIC | Age: 74
End: 2024-02-09
Payer: SELF-PAY

## 2024-02-09 ENCOUNTER — NON-APPOINTMENT (OUTPATIENT)
Age: 74
End: 2024-02-09

## 2024-02-09 PROCEDURE — 92014 COMPRE OPH EXAM EST PT 1/>: CPT

## 2024-02-09 PROCEDURE — 92015 DETERMINE REFRACTIVE STATE: CPT

## 2024-02-23 ENCOUNTER — APPOINTMENT (OUTPATIENT)
Dept: OPHTHALMOLOGY | Facility: CLINIC | Age: 74
End: 2024-02-23

## 2024-08-07 NOTE — PROGRESS NOTE ADULT - ATTENDING COMMENTS
Controlled with current regime
Patient seen and examined. Doing much better and tolerating diet. Labs today fine, WBC was fine yesterday. Abdomen soft, some distension and minimal tenderness in epigastric area. No pain in LLQ which is where he feels discomfort. Plan for discharge home today. Will need follow up with surgery for interval cholecystectomy and also needs follow up with urology. Screening colonoscopy in future.
Patient seen and examined. Had severe abdominal pain overnight mostly in LLQ. No nausea or emesis. Complains of gas pain. No bowel function in a few days. On bowel regimen. On exam, distended but not tender- no rebound or guarding. No pain in epigastric area or even in LLQ area. Low grade fever overnight but BP and HR remain normal. Await CBC this am, if elevated, would obtain CT scan for evaluation. Already on bowel regimen to facilitate BM. No prior colonoscopy.
Patient seen and examined with surgery team. Above note reviewed and edited where appropriate.     Epigastric pain resolved, however complaining of intermittent severe LLQ pain. Pt reports no BM  AFVSS  LLQ tenderness to deep palpation. No rebound or guarding.   Serial abdominal exams  Bowel regimen. If pain/tenderness does not resolve after BM, will obtain CTAP

## 2024-12-05 ENCOUNTER — APPOINTMENT (OUTPATIENT)
Dept: ULTRASOUND IMAGING | Facility: CLINIC | Age: 74
End: 2024-12-05
Payer: MEDICARE

## 2024-12-05 ENCOUNTER — APPOINTMENT (OUTPATIENT)
Dept: CT IMAGING | Facility: CLINIC | Age: 74
End: 2024-12-05
Payer: SELF-PAY

## 2024-12-05 PROCEDURE — 75571 CT HRT W/O DYE W/CA TEST: CPT | Mod: MH

## 2024-12-05 PROCEDURE — 93971 EXTREMITY STUDY: CPT | Mod: RT

## 2024-12-19 ENCOUNTER — APPOINTMENT (OUTPATIENT)
Dept: CARDIOLOGY | Facility: CLINIC | Age: 74
End: 2024-12-19
Payer: MEDICARE

## 2024-12-19 VITALS — DIASTOLIC BLOOD PRESSURE: 74 MMHG | SYSTOLIC BLOOD PRESSURE: 144 MMHG

## 2024-12-19 VITALS
HEIGHT: 71 IN | BODY MASS INDEX: 30.8 KG/M2 | HEART RATE: 73 BPM | OXYGEN SATURATION: 97 % | SYSTOLIC BLOOD PRESSURE: 148 MMHG | WEIGHT: 220 LBS | DIASTOLIC BLOOD PRESSURE: 76 MMHG

## 2024-12-19 DIAGNOSIS — Z98.890 OTHER SPECIFIED POSTPROCEDURAL STATES: ICD-10-CM

## 2024-12-19 DIAGNOSIS — I25.10 ATHEROSCLEROTIC HEART DISEASE OF NATIVE CORONARY ARTERY W/OUT ANGINA PECTORIS: ICD-10-CM

## 2024-12-19 DIAGNOSIS — I49.3 VENTRICULAR PREMATURE DEPOLARIZATION: ICD-10-CM

## 2024-12-19 DIAGNOSIS — I10 ESSENTIAL (PRIMARY) HYPERTENSION: ICD-10-CM

## 2024-12-19 DIAGNOSIS — E78.00 PURE HYPERCHOLESTEROLEMIA, UNSPECIFIED: ICD-10-CM

## 2024-12-19 PROCEDURE — 93000 ELECTROCARDIOGRAM COMPLETE: CPT

## 2024-12-19 PROCEDURE — G2211 COMPLEX E/M VISIT ADD ON: CPT

## 2024-12-19 PROCEDURE — 99204 OFFICE O/P NEW MOD 45 MIN: CPT

## 2024-12-19 RX ORDER — ATORVASTATIN CALCIUM 40 MG/1
40 TABLET, FILM COATED ORAL
Qty: 90 | Refills: 1 | Status: ACTIVE | COMMUNITY
Start: 2024-12-19 | End: 1900-01-01

## 2024-12-19 RX ORDER — METOPROLOL SUCCINATE 25 MG/1
25 TABLET, EXTENDED RELEASE ORAL
Qty: 90 | Refills: 1 | Status: ACTIVE | COMMUNITY
Start: 2024-12-19 | End: 1900-01-01

## 2024-12-19 RX ORDER — HYDROCHLOROTHIAZIDE 12.5 MG/1
12.5 TABLET ORAL
Qty: 90 | Refills: 0 | Status: ACTIVE | COMMUNITY
Start: 2024-12-19

## 2024-12-19 RX ORDER — AZILSARTAN KAMEDOXOMIL 40 MG/1
40 TABLET ORAL DAILY
Qty: 90 | Refills: 1 | Status: ACTIVE | COMMUNITY
Start: 2024-12-19

## 2024-12-19 RX ORDER — EZETIMIBE 10 MG/1
10 TABLET ORAL
Qty: 90 | Refills: 1 | Status: ACTIVE | COMMUNITY
Start: 2024-12-19 | End: 1900-01-01

## 2024-12-23 NOTE — CONSULT NOTE ADULT - I WAS PHYSICALLY PRESENT FOR THE KEY PORTIONS OF THE EVALUATION AND MANAGEMENT (E/M) SERVICE PROVIDED.  I AGREE WITH THE ABOVE HISTORY, PHYSICAL, AND PLAN WHICH I HAVE REVIEWED AND EDITED WHERE APPROPRIATE
: [id number]- Thai    Called parent of patient at this time with Thai . Parent declined need for  at this time.    The information from Dr. Burt's office was relayed. Parent confirmed she will forward report through her portal to Dr. Cyr today.   Statement Selected

## 2024-12-31 ENCOUNTER — APPOINTMENT (OUTPATIENT)
Dept: CARDIOLOGY | Facility: CLINIC | Age: 74
End: 2024-12-31
Payer: MEDICARE

## 2024-12-31 LAB — HBA1C MFR BLD HPLC: 6.3

## 2024-12-31 PROCEDURE — 93306 TTE W/DOPPLER COMPLETE: CPT

## 2024-12-31 PROCEDURE — 93242 EXT ECG>48HR<7D RECORDING: CPT

## 2025-01-02 ENCOUNTER — NON-APPOINTMENT (OUTPATIENT)
Age: 75
End: 2025-01-02

## 2025-01-02 DIAGNOSIS — R93.1 ABNORMAL FINDINGS ON DIAGNOSTIC IMAGING OF HEART AND CORONARY CIRCULATION: ICD-10-CM

## 2025-01-02 RX ORDER — ASPIRIN ENTERIC COATED TABLETS 81 MG 81 MG/1
81 TABLET, DELAYED RELEASE ORAL DAILY
Qty: 30 | Refills: 0 | Status: ACTIVE | COMMUNITY
Start: 2025-01-02

## 2025-01-06 LAB
ANION GAP SERPL CALC-SCNC: 11 MMOL/L
BUN SERPL-MCNC: 20 MG/DL
CALCIUM SERPL-MCNC: 9.9 MG/DL
CHLORIDE SERPL-SCNC: 102 MMOL/L
CO2 SERPL-SCNC: 28 MMOL/L
CREAT SERPL-MCNC: 1.07 MG/DL
EGFR: 72 ML/MIN/1.73M2
GLUCOSE SERPL-MCNC: 114 MG/DL
HCT VFR BLD CALC: 45.3 %
HGB BLD-MCNC: 14.7 G/DL
MCHC RBC-ENTMCNC: 28.7 PG
MCHC RBC-ENTMCNC: 32.5 G/DL
MCV RBC AUTO: 88.5 FL
PLATELET # BLD AUTO: 255 K/UL
POTASSIUM SERPL-SCNC: 5.4 MMOL/L
RBC # BLD: 5.12 M/UL
RBC # FLD: 13.1 %
SODIUM SERPL-SCNC: 141 MMOL/L
WBC # FLD AUTO: 7.34 K/UL

## 2025-01-07 ENCOUNTER — RESULT REVIEW (OUTPATIENT)
Age: 75
End: 2025-01-07

## 2025-01-07 ENCOUNTER — INPATIENT (INPATIENT)
Facility: HOSPITAL | Age: 75
LOS: 7 days | Discharge: HOME | DRG: 998 | End: 2025-01-15
Attending: THORACIC SURGERY (CARDIOTHORACIC VASCULAR SURGERY) | Admitting: THORACIC SURGERY (CARDIOTHORACIC VASCULAR SURGERY)
Payer: MEDICARE

## 2025-01-07 VITALS
DIASTOLIC BLOOD PRESSURE: 88 MMHG | HEART RATE: 55 BPM | HEIGHT: 71 IN | OXYGEN SATURATION: 94 % | TEMPERATURE: 98 F | SYSTOLIC BLOOD PRESSURE: 179 MMHG | WEIGHT: 220.02 LBS | RESPIRATION RATE: 18 BRPM

## 2025-01-07 DIAGNOSIS — Z90.49 ACQUIRED ABSENCE OF OTHER SPECIFIED PARTS OF DIGESTIVE TRACT: Chronic | ICD-10-CM

## 2025-01-07 DIAGNOSIS — I10 ESSENTIAL (PRIMARY) HYPERTENSION: ICD-10-CM

## 2025-01-07 DIAGNOSIS — I25.10 ATHEROSCLEROTIC HEART DISEASE OF NATIVE CORONARY ARTERY WITHOUT ANGINA PECTORIS: ICD-10-CM

## 2025-01-07 DIAGNOSIS — I25.1 ATHEROSCLEROTIC HEART DISEASE OF NATIVE CORONARY ARTERY: ICD-10-CM

## 2025-01-07 DIAGNOSIS — K64.9 UNSPECIFIED HEMORRHOIDS: Chronic | ICD-10-CM

## 2025-01-07 DIAGNOSIS — M51.9 UNSPECIFIED THORACIC, THORACOLUMBAR AND LUMBOSACRAL INTERVERTEBRAL DISC DISORDER: Chronic | ICD-10-CM

## 2025-01-07 LAB
A1C WITH ESTIMATED AVERAGE GLUCOSE RESULT: 6.6 % — HIGH (ref 4–5.6)
ALBUMIN SERPL ELPH-MCNC: 4.2 G/DL — SIGNIFICANT CHANGE UP (ref 3.3–5.2)
ALP SERPL-CCNC: 58 U/L — SIGNIFICANT CHANGE UP (ref 40–120)
ALT FLD-CCNC: 23 U/L — SIGNIFICANT CHANGE UP
ANION GAP SERPL CALC-SCNC: 12 MMOL/L — SIGNIFICANT CHANGE UP (ref 5–17)
APPEARANCE UR: CLEAR — SIGNIFICANT CHANGE UP
APTT BLD: 32.9 SEC — SIGNIFICANT CHANGE UP (ref 24.5–35.6)
AST SERPL-CCNC: 23 U/L — SIGNIFICANT CHANGE UP
BASOPHILS # BLD AUTO: 0.03 K/UL — SIGNIFICANT CHANGE UP (ref 0–0.2)
BASOPHILS NFR BLD AUTO: 0.5 % — SIGNIFICANT CHANGE UP (ref 0–2)
BILIRUB SERPL-MCNC: 0.3 MG/DL — LOW (ref 0.4–2)
BILIRUB UR-MCNC: NEGATIVE — SIGNIFICANT CHANGE UP
BLD GP AB SCN SERPL QL: SIGNIFICANT CHANGE UP
BUN SERPL-MCNC: 19.6 MG/DL — SIGNIFICANT CHANGE UP (ref 8–20)
CALCIUM SERPL-MCNC: 9.2 MG/DL — SIGNIFICANT CHANGE UP (ref 8.4–10.5)
CHLORIDE SERPL-SCNC: 103 MMOL/L — SIGNIFICANT CHANGE UP (ref 96–108)
CO2 SERPL-SCNC: 26 MMOL/L — SIGNIFICANT CHANGE UP (ref 22–29)
COLOR SPEC: YELLOW — SIGNIFICANT CHANGE UP
CREAT SERPL-MCNC: 0.88 MG/DL — SIGNIFICANT CHANGE UP (ref 0.5–1.3)
DIFF PNL FLD: NEGATIVE — SIGNIFICANT CHANGE UP
EGFR: 89 ML/MIN/1.73M2 — SIGNIFICANT CHANGE UP
EOSINOPHIL # BLD AUTO: 0.3 K/UL — SIGNIFICANT CHANGE UP (ref 0–0.5)
EOSINOPHIL NFR BLD AUTO: 4.7 % — SIGNIFICANT CHANGE UP (ref 0–6)
ESTIMATED AVERAGE GLUCOSE: 143 MG/DL — HIGH (ref 68–114)
GLUCOSE SERPL-MCNC: 105 MG/DL — HIGH (ref 70–99)
GLUCOSE UR QL: NEGATIVE MG/DL — SIGNIFICANT CHANGE UP
HCT VFR BLD CALC: 42.5 % — SIGNIFICANT CHANGE UP (ref 39–50)
HGB BLD-MCNC: 14.1 G/DL — SIGNIFICANT CHANGE UP (ref 13–17)
IMM GRANULOCYTES NFR BLD AUTO: 0.3 % — SIGNIFICANT CHANGE UP (ref 0–0.9)
INR BLD: 0.94 RATIO — SIGNIFICANT CHANGE UP (ref 0.85–1.16)
KETONES UR-MCNC: NEGATIVE MG/DL — SIGNIFICANT CHANGE UP
LEUKOCYTE ESTERASE UR-ACNC: NEGATIVE — SIGNIFICANT CHANGE UP
LYMPHOCYTES # BLD AUTO: 1.97 K/UL — SIGNIFICANT CHANGE UP (ref 1–3.3)
LYMPHOCYTES # BLD AUTO: 30.9 % — SIGNIFICANT CHANGE UP (ref 13–44)
MCHC RBC-ENTMCNC: 28.8 PG — SIGNIFICANT CHANGE UP (ref 27–34)
MCHC RBC-ENTMCNC: 33.2 G/DL — SIGNIFICANT CHANGE UP (ref 32–36)
MCV RBC AUTO: 86.7 FL — SIGNIFICANT CHANGE UP (ref 80–100)
MONOCYTES # BLD AUTO: 0.58 K/UL — SIGNIFICANT CHANGE UP (ref 0–0.9)
MONOCYTES NFR BLD AUTO: 9.1 % — SIGNIFICANT CHANGE UP (ref 2–14)
NEUTROPHILS # BLD AUTO: 3.47 K/UL — SIGNIFICANT CHANGE UP (ref 1.8–7.4)
NEUTROPHILS NFR BLD AUTO: 54.5 % — SIGNIFICANT CHANGE UP (ref 43–77)
NITRITE UR-MCNC: NEGATIVE — SIGNIFICANT CHANGE UP
NT-PROBNP SERPL-SCNC: 100 PG/ML — SIGNIFICANT CHANGE UP (ref 0–300)
PA ADP PRP-ACNC: 203 PRU — SIGNIFICANT CHANGE UP (ref 182–335)
PH UR: 6.5 — SIGNIFICANT CHANGE UP (ref 5–8)
PLATELET # BLD AUTO: 197 K/UL — SIGNIFICANT CHANGE UP (ref 150–400)
POTASSIUM SERPL-MCNC: 4.5 MMOL/L — SIGNIFICANT CHANGE UP (ref 3.5–5.3)
POTASSIUM SERPL-SCNC: 4.5 MMOL/L — SIGNIFICANT CHANGE UP (ref 3.5–5.3)
PREALB SERPL-MCNC: 24 MG/DL — SIGNIFICANT CHANGE UP (ref 18–38)
PROT SERPL-MCNC: 7.1 G/DL — SIGNIFICANT CHANGE UP (ref 6.6–8.7)
PROT UR-MCNC: SIGNIFICANT CHANGE UP MG/DL
PROTHROM AB SERPL-ACNC: 10.9 SEC — SIGNIFICANT CHANGE UP (ref 9.9–13.4)
RBC # BLD: 4.9 M/UL — SIGNIFICANT CHANGE UP (ref 4.2–5.8)
RBC # FLD: 13.2 % — SIGNIFICANT CHANGE UP (ref 10.3–14.5)
SODIUM SERPL-SCNC: 141 MMOL/L — SIGNIFICANT CHANGE UP (ref 135–145)
SP GR SPEC: 1.03 — SIGNIFICANT CHANGE UP (ref 1–1.03)
T3 SERPL-MCNC: 134 NG/DL — SIGNIFICANT CHANGE UP (ref 80–200)
T4 AB SER-ACNC: 6.5 UG/DL — SIGNIFICANT CHANGE UP (ref 4.5–12)
TSH SERPL-MCNC: 3.55 UIU/ML — SIGNIFICANT CHANGE UP (ref 0.27–4.2)
UROBILINOGEN FLD QL: 0.2 MG/DL — SIGNIFICANT CHANGE UP (ref 0.2–1)
WBC # BLD: 6.37 K/UL — SIGNIFICANT CHANGE UP (ref 3.8–10.5)
WBC # FLD AUTO: 6.37 K/UL — SIGNIFICANT CHANGE UP (ref 3.8–10.5)

## 2025-01-07 PROCEDURE — 93306 TTE W/DOPPLER COMPLETE: CPT | Mod: 26

## 2025-01-07 PROCEDURE — 99222 1ST HOSP IP/OBS MODERATE 55: CPT

## 2025-01-07 PROCEDURE — 93880 EXTRACRANIAL BILAT STUDY: CPT | Mod: 26

## 2025-01-07 PROCEDURE — 93971 EXTREMITY STUDY: CPT | Mod: 26,RT

## 2025-01-07 PROCEDURE — 71045 X-RAY EXAM CHEST 1 VIEW: CPT | Mod: 26

## 2025-01-07 RX ORDER — METOPROLOL TARTRATE 50 MG
12.5 TABLET ORAL
Refills: 0 | Status: DISCONTINUED | OUTPATIENT
Start: 2025-01-07 | End: 2025-01-10

## 2025-01-07 RX ORDER — EZETIMIBE 10 MG/1
10 TABLET ORAL DAILY
Refills: 0 | Status: DISCONTINUED | OUTPATIENT
Start: 2025-01-07 | End: 2025-01-10

## 2025-01-07 RX ORDER — SODIUM CHLORIDE 9 MG/ML
3 INJECTION, SOLUTION INTRAMUSCULAR; INTRAVENOUS; SUBCUTANEOUS EVERY 8 HOURS
Refills: 0 | Status: DISCONTINUED | OUTPATIENT
Start: 2025-01-07 | End: 2025-01-10

## 2025-01-07 RX ORDER — EZETIMIBE 10 MG/1
1 TABLET ORAL
Refills: 0 | DISCHARGE

## 2025-01-07 RX ORDER — ATORVASTATIN CALCIUM 40 MG/1
40 TABLET, FILM COATED ORAL AT BEDTIME
Refills: 0 | Status: DISCONTINUED | OUTPATIENT
Start: 2025-01-07 | End: 2025-01-10

## 2025-01-07 RX ORDER — PANTOPRAZOLE 40 MG/1
40 TABLET, DELAYED RELEASE ORAL
Refills: 0 | Status: DISCONTINUED | OUTPATIENT
Start: 2025-01-07 | End: 2025-01-10

## 2025-01-07 RX ADMIN — SODIUM CHLORIDE 3 MILLILITER(S): 9 INJECTION, SOLUTION INTRAMUSCULAR; INTRAVENOUS; SUBCUTANEOUS at 22:39

## 2025-01-07 RX ADMIN — ATORVASTATIN CALCIUM 40 MILLIGRAM(S): 40 TABLET, FILM COATED ORAL at 22:38

## 2025-01-07 NOTE — H&P ADULT - ASSESSMENT
73y/o male with PMHx HTN, asymptomatic bradycardia, h/o frequent PVCs presented to [facility] for [place] LHC. Pt reports having outpatient CT calcium scan which was elevated. Pt was referred to [facility] for LHC today, revealing TVD - pLAD 90%, oCx 75%, and %. EF normal at 60%. Pt transferred to [facility] for CABG work up.     Pt seen and examined at bedside. In no acute distress, resting comfortably in hospital bed. Pt reports being completely asymptomatic prior to CT calcium score, ordered by outpatient provider. States he followed up with his cardiologist, Dr. Currie, who sent him to [facility] for LHC. Reports being very active on a daily basis. Denies any current fever, chills, dizziness, lightheadedness, HA, chest pain, palpitations, SOB, abdominal pain, N/V/D, LE edema, urinary symptoms.  75y/o male with PMHx HTN, asymptomatic bradycardia, h/o frequent PVCs [place] [facility] [place] Pt reports having outpatient CT calcium scan which was elevated. Pt was referred to [facility] [place] today, revealing TVD - pLAD 90%, oCx 75%, and %. EF normal at 60%. Pt transferred to [facility] for CABG work up.     Pt seen and examined at bedside. In no acute distress, resting comfortably in hospital bed. Pt reports being completely asymptomatic prior to CT calcium score. Denies any current fever, chills, dizziness, lightheadedness, HA, chest pain, palpitations, SOB, abdominal pain, N/V/D, LE edema, urinary symptoms. Pt does endorse RLE tenderness on his inner calf for the past 1.5 months. States he had a RLE US 2 weeks ago which was negative for a dvt.  75y/o male with PMHx HTN, asymptomatic bradycardia, h/o frequent PVCs presented to [facility] for [place] LHC. Pt reports having outpatient CT calcium scan which was elevated. Pt was referred to [facility] for LHC today, revealing TVD - pLAD 90%, oCx 75%, and %. EF normal at 60%. Pt transferred to [facility] for CABG work up.

## 2025-01-07 NOTE — H&P ADULT - NSICDXFAMILYHX_GEN_ALL_CORE_FT
FAMILY HISTORY:  Family history of cancer    Father  Still living? Unknown  FH: HTN (hypertension), Age at diagnosis: Age Unknown

## 2025-01-07 NOTE — H&P ADULT - HISTORY OF PRESENT ILLNESS
75y/o male with PMHx HTN, asymptomatic bradycardia, h/o frequent PVCs [place] [facility] [place] LHC. Pt reports having outpatient CT calcium scan which was elevated. Pt was referred to [facility] [place] LHC today, revealing TVD - pLAD 90%, oCx 75%, and %. EF normal at 60%. Pt transferred to [facility] for CABG work up.     Pt seen and examined at bedside. In no acute distress, resting comfortably in hospital bed. Pt reports being completely asymptomatic prior to CT calcium score. Denies any current fever, chills, dizziness, lightheadedness, HA, chest pain, palpitations, SOB, abdominal pain, N/V/D, LE edema, urinary symptoms.  73y/o male with PMHx HTN, asymptomatic bradycardia, h/o frequent PVCs [place] [facility] [place]. Pt reports having outpatient CT calcium scan which was elevated. Pt was [place] [facility] [place], revealing TVD - pLAD 90%, oCx 75%, and %. EF normal at 60%. Pt transferred to [facility] for CABG work up.     Pt seen and examined at bedside. In no acute distress, resting comfortably in hospital bed. Pt reports being completely asymptomatic prior to CT calcium score. Denies any current fever, chills, dizziness, lightheadedness, HA, chest pain, palpitations, SOB, abdominal pain, N/V/D, LE edema, urinary symptoms. Pt does endorse RLE tenderness on his inner calf for the past 1.5 months. States he had a RLE US 2 weeks ago which was negative for a dvt.

## 2025-01-07 NOTE — H&P ADULT - NSHPSOCIALHISTORY_GEN_ALL_CORE
Lives at home with wife, 2 story house with 2 steps to get in  Independent with ADLs and ambulation  Retired - former

## 2025-01-07 NOTE — H&P ADULT - NSHPPHYSICALEXAM_GEN_ALL_CORE
General: NAD, resting comfortably in hospital bed.   HEENT:  NCAT, PERRL, EOMI. No conjuctival edema or icterus, no thrush.  Neck: supple, trachea midline  Pulm: CTA, good air entry, equal bilaterally, no rales/rhonchi/wheezing, no accessory muscle use noted  CV: regular rate, regular rhythm, +S1S2, no murmur or rub noted  Abd: soft, NT, ND, + BS  Ext: CLARK x 4, no edema, no cyanosis or clubbing, 2+ DP b/l, distal motor/neuro/circ intact. Healed surgical scar of RLE inner calf, mild TTP over scar.   Skin: warm, dry, well perfused   Neuro: A+O x 3, non-focal, speech clear and intact General: NAD, resting comfortably in hospital bed.   HEENT:  NCAT, PERRL, EOMI. No conjuctival edema or icterus, no thrush.  Neck: supple, trachea midline  Pulm: CTA, good air entry, equal bilaterally, no rales/rhonchi/wheezing, no accessory muscle use noted  CV: regular rate, regular rhythm, +S1S2, no murmur or rub noted  Abd: soft, NT, ND, + BS  Ext: CLARK x 4, no edema, no cyanosis or clubbing, 2+ DP b/l, distal motor/neuro/circ intact. Healed surgical scar of RLE inner calf, mild TTP over scar. No erythema, swelling.   Skin: warm, dry, well perfused   Neuro: A+O x 3, non-focal, speech clear and intact

## 2025-01-07 NOTE — H&P ADULT - PROBLEM SELECTOR PLAN 2
Hold ARB in periop setting   BB  Monitor BP Hold ARB in periop setting   lopressor 12.5 bid  c/w hctz  Monitor BP

## 2025-01-07 NOTE — H&P ADULT - NSICDXPASTSURGICALHX_GEN_ALL_CORE_FT
PAST SURGICAL HISTORY:  Hemorrhoid 2012  Hemorrhoidectomy    Lumbar disc disease lumbar disc surgery 15 years ago    S/P cholecystectomy

## 2025-01-07 NOTE — H&P ADULT - PROBLEM SELECTOR PLAN 1
Pt asymptomatic presented [place] [facility] [place] LHC after elevated CT calcium score. Transferred to [facility] for further CABG eval.   LHC today revealing pLAD 90%, oCx 75%, dRCA 100%, EF 60%.   Preop work up ordered including carotid US to assess for carotid stenosis, PFTs to eval lung function, TTE to eval EF / WMA / Valve function, and lab work including TSH, prealbumin, Hgb A1C, P2Y12, BNP, T&S, MRSA/MSSA, and UA.   BB and statin   No ASA per Dr. Baltazar   Case d/w Dr. Baltazar   Surgical plan to be determined, pending preop testing results.

## 2025-01-08 LAB
ANION GAP SERPL CALC-SCNC: 12 MMOL/L — SIGNIFICANT CHANGE UP (ref 5–17)
BUN SERPL-MCNC: 23.6 MG/DL — HIGH (ref 8–20)
CALCIUM SERPL-MCNC: 8.9 MG/DL — SIGNIFICANT CHANGE UP (ref 8.4–10.5)
CHLORIDE SERPL-SCNC: 103 MMOL/L — SIGNIFICANT CHANGE UP (ref 96–108)
CHOLEST SERPL-MCNC: 101 MG/DL — SIGNIFICANT CHANGE UP
CO2 SERPL-SCNC: 24 MMOL/L — SIGNIFICANT CHANGE UP (ref 22–29)
CREAT SERPL-MCNC: 0.96 MG/DL — SIGNIFICANT CHANGE UP (ref 0.5–1.3)
EGFR: 82 ML/MIN/1.73M2 — SIGNIFICANT CHANGE UP
GLUCOSE SERPL-MCNC: 107 MG/DL — HIGH (ref 70–99)
HCT VFR BLD CALC: 42.2 % — SIGNIFICANT CHANGE UP (ref 39–50)
HDLC SERPL-MCNC: 55 MG/DL — SIGNIFICANT CHANGE UP
HGB BLD-MCNC: 14 G/DL — SIGNIFICANT CHANGE UP (ref 13–17)
LIPID PNL WITH DIRECT LDL SERPL: 36 MG/DL — SIGNIFICANT CHANGE UP
MAGNESIUM SERPL-MCNC: 2.4 MG/DL — SIGNIFICANT CHANGE UP (ref 1.6–2.6)
MCHC RBC-ENTMCNC: 29.3 PG — SIGNIFICANT CHANGE UP (ref 27–34)
MCHC RBC-ENTMCNC: 33.2 G/DL — SIGNIFICANT CHANGE UP (ref 32–36)
MCV RBC AUTO: 88.3 FL — SIGNIFICANT CHANGE UP (ref 80–100)
MRSA PCR RESULT.: SIGNIFICANT CHANGE UP
NON HDL CHOLESTEROL: 46 MG/DL — SIGNIFICANT CHANGE UP
PLATELET # BLD AUTO: 214 K/UL — SIGNIFICANT CHANGE UP (ref 150–400)
POTASSIUM SERPL-MCNC: 4.5 MMOL/L — SIGNIFICANT CHANGE UP (ref 3.5–5.3)
POTASSIUM SERPL-SCNC: 4.5 MMOL/L — SIGNIFICANT CHANGE UP (ref 3.5–5.3)
RBC # BLD: 4.78 M/UL — SIGNIFICANT CHANGE UP (ref 4.2–5.8)
RBC # FLD: 13.2 % — SIGNIFICANT CHANGE UP (ref 10.3–14.5)
S AUREUS DNA NOSE QL NAA+PROBE: SIGNIFICANT CHANGE UP
SODIUM SERPL-SCNC: 139 MMOL/L — SIGNIFICANT CHANGE UP (ref 135–145)
T4 FREE SERPL-MCNC: 0.9 NG/DL — SIGNIFICANT CHANGE UP (ref 0.9–1.7)
TRIGL SERPL-MCNC: 49 MG/DL — SIGNIFICANT CHANGE UP
WBC # BLD: 7.14 K/UL — SIGNIFICANT CHANGE UP (ref 3.8–10.5)
WBC # FLD AUTO: 7.14 K/UL — SIGNIFICANT CHANGE UP (ref 3.8–10.5)

## 2025-01-08 PROCEDURE — 99232 SBSQ HOSP IP/OBS MODERATE 35: CPT

## 2025-01-08 RX ADMIN — EZETIMIBE 10 MILLIGRAM(S): 10 TABLET ORAL at 09:24

## 2025-01-08 RX ADMIN — ATORVASTATIN CALCIUM 40 MILLIGRAM(S): 40 TABLET, FILM COATED ORAL at 21:58

## 2025-01-08 RX ADMIN — Medication 12.5 MILLIGRAM(S): at 17:15

## 2025-01-08 RX ADMIN — Medication 12.5 MILLIGRAM(S): at 05:17

## 2025-01-08 RX ADMIN — SODIUM CHLORIDE 3 MILLILITER(S): 9 INJECTION, SOLUTION INTRAMUSCULAR; INTRAVENOUS; SUBCUTANEOUS at 21:59

## 2025-01-08 RX ADMIN — SODIUM CHLORIDE 3 MILLILITER(S): 9 INJECTION, SOLUTION INTRAMUSCULAR; INTRAVENOUS; SUBCUTANEOUS at 05:15

## 2025-01-08 RX ADMIN — SODIUM CHLORIDE 3 MILLILITER(S): 9 INJECTION, SOLUTION INTRAMUSCULAR; INTRAVENOUS; SUBCUTANEOUS at 17:17

## 2025-01-08 RX ADMIN — PANTOPRAZOLE 40 MILLIGRAM(S): 40 TABLET, DELAYED RELEASE ORAL at 05:17

## 2025-01-08 NOTE — CONSULT NOTE ADULT - ASSESSMENT
ASSESSMENT: 73y/o male with PMHx HTN, asymptomatic bradycardia, h/o frequent PVCs [place] [facility] [place] Pt reports having outpatient CT calcium scan which was elevated. Pt was [place] [facility] [place], revealing TVD - pLAD 90%, oCx 75%, and %. EF normal at 60%. Pt transferred to [facility] for CABG work up. patient complaining of RLE pain for past month states he had surgery x2 in this leg when he was child     PLAN:    - recommend CT scan of RLE   - Plan discussed with Attending, Dr. Nevarez        ASSESSMENT: 73y/o male with PMHx HTN, asymptomatic bradycardia, h/o frequent PVCs presented to [facility] for elective LHC. Pt reports having outpatient CT calcium scan which was elevated. Pt was referred to [facility] for LHC today, revealing TVD - pLAD 90%, oCx 75%, and %. EF normal at 60%. Pt transferred to [facility] for CABG work up. patient complaining of RLE pain for past month states he had surgery x2 in this leg when he was child     PLAN:    - recommend CT scan IV cvontrast of RLE   - Plan discussed with Attending, Dr. Nevarez

## 2025-01-08 NOTE — PROGRESS NOTE ADULT - ASSESSMENT
73y/o male with PMHx HTN, asymptomatic bradycardia, h/o frequent PVCs presented to [facility] [place] LHC. Pt reports having outpatient CT calcium scan which was elevated. Pt was referred to [facility] for LHC today, revealing TVD - pLAD 90%, oCx 75%, and %. EF normal at 60%. Pt transferred to [facility] for CABG work up.

## 2025-01-08 NOTE — PROGRESS NOTE ADULT - SUBJECTIVE AND OBJECTIVE BOX
Brief summary:  74yMale seen and evaluated bedside. Endorsing no acute complaints. Denies any chest pain, palpitations, orthopnea, dyspnea on exertion, shortness of breath, wheezing, abd pain, nausea, vomiting, constipation, lightheadedness, headaches, fevers, or chills.     PAST MEDICAL & SURGICAL HISTORY:  Hypertension      Bigeminy      Exostosis  left ear canal      BPH (benign prostatic hypertrophy)      Hard of hearing  Exostosis left ear  in May 2015      Lumbar disc disease      Hemorrhoid  2012  Hemorrhoidectomy      Lumbar disc disease  lumbar disc surgery 15 years ago      S/P cholecystectomy          Medications:  atorvastatin 40 milliGRAM(s) Oral at bedtime  ezetimibe 10 milliGRAM(s) Oral daily  hydrochlorothiazide 12.5 milliGRAM(s) Oral daily  metoprolol tartrate 12.5 milliGRAM(s) Oral two times a day  pantoprazole    Tablet 40 milliGRAM(s) Oral before breakfast  sodium chloride 0.9% lock flush 3 milliLiter(s) IV Push every 8 hours      MEDICATIONS  (PRN):      Daily Review:    Height (cm): 180.3 (01-07 @ 18:21)  Weight (kg): 99.8 (01-07 @ 18:21)  BMI (kg/m2): 30.7 (01-07 @ 18:21)  BSA (m2): 2.2 (01-07 @ 18:21)                            14.1   6.37  )-----------( 197      ( 07 Jan 2025 19:20 )             42.5   01-07    141  |  103  |  19.6  ----------------------------<  105[H]  4.5   |  26.0  |  0.88    Ca    9.2      07 Jan 2025 19:20    TPro  7.1  /  Alb  4.2  /  TBili  0.3[L]  /  DBili  x   /  AST  23  /  ALT  23  /  AlkPhos  58  01-07      PT/INR - ( 07 Jan 2025 19:20 )   PT: 10.9 sec;   INR: 0.94 ratio         PTT - ( 07 Jan 2025 19:20 )  PTT:32.9 sec    T(C): 36.4 (01-08-25 @ 00:38), Max: 36.8 (01-07-25 @ 18:21)  HR: 56 (01-08-25 @ 00:38) (51 - 56)  BP: 160/85 (01-08-25 @ 00:38) (160/85 - 179/88)  RR: 18 (01-08-25 @ 00:38) (18 - 18)  SpO2: 95% (01-08-25 @ 00:38) (94% - 95%)  Wt(kg): --    CAPILLARY BLOOD GLUCOSE          I&O's Summary    07 Jan 2025 07:01  -  08 Jan 2025 01:52  --------------------------------------------------------  IN: 0 mL / OUT: 300 mL / NET: -300 mL        Physical Exam  General: Well appearing, laying in bed on an incline, NAD  Neurological: AOx3, no focal neurological deficits  Cardiovascular: Regular Rate/Rhythm, S1/2 auscultated, No extra heart sounds  Respiratory: CTA b/l over all lung fields, No adventitious breath sounds  Gastrointestinal: Bowel sounds present in all 4 quadrants, Abdomen is soft, non-tender, non-distended  Extremities: No peripheral edema noted, 5/5 strength and full range of motion in all 4 extremities b/l  Vascular: 2+ peripheral pulses b/l in upper and lower extremities, Radial, DP

## 2025-01-08 NOTE — CONSULT NOTE ADULT - SUBJECTIVE AND OBJECTIVE BOX
surgery was consulted for persistent RLE pain for past month     HPI: " 73y/o male with PMHx HTN, asymptomatic bradycardia, h/o frequent PVCs [place] [facility] [place] Pt reports having outpatient CT calcium scan which was elevated. [place] [facility] [place], revealing TVD - pLAD 90%, oCx 75%, and %. EF normal at 60%. Pt transferred to [facility] for CABG work up.  "     ROS: 10-system review is otherwise negative except HPI above.      PAST MEDICAL & SURGICAL HISTORY:  Hypertension      Bigeminy      Exostosis  left ear canal      BPH (benign prostatic hypertrophy)      Hard of hearing  Exostosis left ear  in May 2015      Lumbar disc disease      Hemorrhoid  2012  Hemorrhoidectomy      Lumbar disc disease  lumbar disc surgery 15 years ago      S/P cholecystectomy        FAMILY HISTORY:  Family history of cancer    FH: HTN (hypertension) (Father)      Family history not pertinent as reviewed with the patient.    SOCIAL HISTORY:  Denies any toxic habits    ALLERGIES: NKA No Known Allergies      Home Medications:  acetaminophen 325 mg oral tablet: 2 tab(s) orally every 6 hours (30 Apr 2020 08:56)  aspirin 81 mg oral delayed release tablet: 1 tab(s) orally once a day (07 Jan 2025 19:39)  atorvastatin 40 mg oral tablet: 1 tab(s) orally once a day (at bedtime) (07 Jan 2025 19:38)  Edarbi 40 mg oral tablet: 1 tab(s) orally once a day (07 Jan 2025 19:39)  Edarbyclor 40 mg-12.5 mg oral tablet: 1 tab(s) orally once a day (29 Apr 2020 06:41)  hydroCHLOROthiazide 12.5 mg oral tablet: 1 tab(s) orally once a day (07 Jan 2025 19:39)  metoprolol succinate 25 mg oral capsule, extended release: 1 cap(s) orally once a day (07 Jan 2025 19:38)  simethicone 80 mg oral tablet, chewable: 1 tab(s) orally every 12 hours, As needed, Gas (05 May 2020 10:44)  traMADol 50 mg oral tablet: 0.5 tab(s) orally every 4 hours, As needed, Moderate Pain (4 - 6) (05 May 2020 10:44)  Zetia 10 mg oral tablet: 1 tab(s) orally once a day (07 Jan 2025 19:38)      --------------------------------------------------------------------------------------------    PHYSICAL EXAM:   General: NAD, Lying in bed comfortably  Neuro: A+Ox3  Resp: Non labored breathing on RA  GI/Abd: Soft, NT/ND, no rebound/guarding, no masses palpated  Vascular: All 4 extremities warm and well perfused.   Musculoskeletal: All 4 extremities moving spontaneously, no limitations, no spinal tenderness. RLE edema +1  --------------------------------------------------------------------------------------------    LABS                 14.0   7.14   )----------(  214       ( 08 Jan 2025 05:10 )               42.2      139    |  103    |  23.6   ----------------------------<  107        ( 08 Jan 2025 05:10 )  4.5     |  24.0   |  0.96     Ca    8.9        ( 08 Jan 2025 05:10 )  Mg     2.4       ( 08 Jan 2025 05:10 )            CAPILLARY BLOOD GLUCOSE        Urinalysis Basic - ( 08 Jan 2025 05:10 )    Color: x / Appearance: x / SG: x / pH: x  Gluc: 107 mg/dL / Ketone: x  / Bili: x / Urobili: x   Blood: x / Protein: x / Nitrite: x   Leuk Esterase: x / RBC: x / WBC x   Sq Epi: x / Non Sq Epi: x / Bacteria: x

## 2025-01-09 LAB
ANION GAP SERPL CALC-SCNC: 12 MMOL/L — SIGNIFICANT CHANGE UP (ref 5–17)
BLD GP AB SCN SERPL QL: SIGNIFICANT CHANGE UP
BUN SERPL-MCNC: 25.3 MG/DL — HIGH (ref 8–20)
CALCIUM SERPL-MCNC: 9.2 MG/DL — SIGNIFICANT CHANGE UP (ref 8.4–10.5)
CHLORIDE SERPL-SCNC: 101 MMOL/L — SIGNIFICANT CHANGE UP (ref 96–108)
CO2 SERPL-SCNC: 26 MMOL/L — SIGNIFICANT CHANGE UP (ref 22–29)
CREAT SERPL-MCNC: 0.98 MG/DL — SIGNIFICANT CHANGE UP (ref 0.5–1.3)
EGFR: 80 ML/MIN/1.73M2 — SIGNIFICANT CHANGE UP
GLUCOSE SERPL-MCNC: 106 MG/DL — HIGH (ref 70–99)
HCT VFR BLD CALC: 42 % — SIGNIFICANT CHANGE UP (ref 39–50)
HGB BLD-MCNC: 14.1 G/DL — SIGNIFICANT CHANGE UP (ref 13–17)
MAGNESIUM SERPL-MCNC: 2.2 MG/DL — SIGNIFICANT CHANGE UP (ref 1.8–2.6)
MCHC RBC-ENTMCNC: 29 PG — SIGNIFICANT CHANGE UP (ref 27–34)
MCHC RBC-ENTMCNC: 33.6 G/DL — SIGNIFICANT CHANGE UP (ref 32–36)
MCV RBC AUTO: 86.4 FL — SIGNIFICANT CHANGE UP (ref 80–100)
PA ADP PRP-ACNC: 220 PRU — SIGNIFICANT CHANGE UP (ref 182–335)
PLATELET # BLD AUTO: 209 K/UL — SIGNIFICANT CHANGE UP (ref 150–400)
POTASSIUM SERPL-MCNC: 4.2 MMOL/L — SIGNIFICANT CHANGE UP (ref 3.5–5.3)
POTASSIUM SERPL-SCNC: 4.2 MMOL/L — SIGNIFICANT CHANGE UP (ref 3.5–5.3)
RBC # BLD: 4.86 M/UL — SIGNIFICANT CHANGE UP (ref 4.2–5.8)
RBC # FLD: 13.1 % — SIGNIFICANT CHANGE UP (ref 10.3–14.5)
SODIUM SERPL-SCNC: 139 MMOL/L — SIGNIFICANT CHANGE UP (ref 135–145)
WBC # BLD: 7.41 K/UL — SIGNIFICANT CHANGE UP (ref 3.8–10.5)
WBC # FLD AUTO: 7.41 K/UL — SIGNIFICANT CHANGE UP (ref 3.8–10.5)

## 2025-01-09 PROCEDURE — 73700 CT LOWER EXTREMITY W/O DYE: CPT | Mod: 26,RT

## 2025-01-09 PROCEDURE — 71045 X-RAY EXAM CHEST 1 VIEW: CPT | Mod: 26

## 2025-01-09 PROCEDURE — 99232 SBSQ HOSP IP/OBS MODERATE 35: CPT | Mod: 57

## 2025-01-09 PROCEDURE — 99232 SBSQ HOSP IP/OBS MODERATE 35: CPT

## 2025-01-09 RX ORDER — CHLORHEXIDINE GLUCONATE 1.2 MG/ML
15 RINSE ORAL EVERY 12 HOURS
Refills: 0 | Status: DISCONTINUED | OUTPATIENT
Start: 2025-01-09 | End: 2025-01-10

## 2025-01-09 RX ORDER — CHLORHEXIDINE GLUCONATE 1.2 MG/ML
1 RINSE ORAL
Refills: 0 | Status: DISCONTINUED | OUTPATIENT
Start: 2025-01-09 | End: 2025-01-10

## 2025-01-09 RX ORDER — VANCOMYCIN HYDROCHLORIDE 5 G/100ML
1500 INJECTION, POWDER, LYOPHILIZED, FOR SOLUTION INTRAVENOUS ONCE
Refills: 0 | Status: COMPLETED | OUTPATIENT
Start: 2025-01-10 | End: 2025-01-10

## 2025-01-09 RX ORDER — CEFUROXIME AXETIL 250 MG
1500 TABLET ORAL ONCE
Refills: 0 | Status: DISCONTINUED | OUTPATIENT
Start: 2025-01-10 | End: 2025-01-10

## 2025-01-09 RX ADMIN — SODIUM CHLORIDE 3 MILLILITER(S): 9 INJECTION, SOLUTION INTRAMUSCULAR; INTRAVENOUS; SUBCUTANEOUS at 13:35

## 2025-01-09 RX ADMIN — ATORVASTATIN CALCIUM 40 MILLIGRAM(S): 40 TABLET, FILM COATED ORAL at 21:50

## 2025-01-09 RX ADMIN — CHLORHEXIDINE GLUCONATE 15 MILLILITER(S): 1.2 RINSE ORAL at 16:57

## 2025-01-09 RX ADMIN — CHLORHEXIDINE GLUCONATE 15 MILLILITER(S): 1.2 RINSE ORAL at 12:03

## 2025-01-09 RX ADMIN — SODIUM CHLORIDE 3 MILLILITER(S): 9 INJECTION, SOLUTION INTRAMUSCULAR; INTRAVENOUS; SUBCUTANEOUS at 05:27

## 2025-01-09 RX ADMIN — PANTOPRAZOLE 40 MILLIGRAM(S): 40 TABLET, DELAYED RELEASE ORAL at 05:24

## 2025-01-09 RX ADMIN — EZETIMIBE 10 MILLIGRAM(S): 10 TABLET ORAL at 08:34

## 2025-01-09 RX ADMIN — Medication 12.5 MILLIGRAM(S): at 16:57

## 2025-01-09 RX ADMIN — CHLORHEXIDINE GLUCONATE 1 APPLICATION(S): 1.2 RINSE ORAL at 12:01

## 2025-01-09 RX ADMIN — SODIUM CHLORIDE 3 MILLILITER(S): 9 INJECTION, SOLUTION INTRAMUSCULAR; INTRAVENOUS; SUBCUTANEOUS at 21:55

## 2025-01-09 RX ADMIN — Medication 12.5 MILLIGRAM(S): at 05:24

## 2025-01-09 RX ADMIN — CHLORHEXIDINE GLUCONATE 1 APPLICATION(S): 1.2 RINSE ORAL at 16:55

## 2025-01-09 NOTE — PROGRESS NOTE ADULT - SUBJECTIVE AND OBJECTIVE BOX
SIGNIFICANT RECENT/PAST 24 HR EVENTS:  No acute events reported overnight.     SUBJECTIVE:  Pt currently lying in bed in NAD. Denies fevers, chills, HA, dizziness, CP, SOB, abd pain, N/V/D, numbness/tingling in extremities, or any other acute complaints.     PAST MEDICAL & SURGICAL HISTORY:  Hypertension    Bigeminy    Exostosis    BPH (benign prostatic hypertrophy)    Hard of hearing    Lumbar disc disease      Hemorrhoid    Lumbar disc disease    S/P cholecystectomy        DAILY REVIEW:  Telemetry:  Vitals   ICU Vital Signs Last 24 Hrs  T(C): 36.7 (09 Jan 2025 00:15), Max: 36.8 (08 Jan 2025 15:57)  T(F): 98.1 (09 Jan 2025 00:15), Max: 98.3 (08 Jan 2025 15:57)  HR: 57 (09 Jan 2025 00:15) (54 - 60)  BP: 134/63 (09 Jan 2025 00:15) (134/63 - 177/76)  BP(mean): --  ABP: --  ABP(mean): --  RR: 18 (09 Jan 2025 00:15) (16 - 18)  SpO2: 98% (09 Jan 2025 00:15) (95% - 98%)    O2 Parameters below as of 09 Jan 2025 00:15  Patient On (Oxygen Delivery Method): room air            I&O's Detail    07 Jan 2025 07:01  -  08 Jan 2025 07:00  --------------------------------------------------------  IN:  Total IN: 0 mL    OUT:    Voided (mL): 500 mL  Total OUT: 500 mL    Total NET: -500 mL      08 Jan 2025 07:01  -  09 Jan 2025 04:02  --------------------------------------------------------  IN:  Total IN: 0 mL    OUT:    Voided (mL): 300 mL  Total OUT: 300 mL    Total NET: -300 mL          Daily     Daily   Admit Wt: Drug Dosing Weight  Height (cm): 180.3 (07 Jan 2025 18:21)  Weight (kg): 99.8 (07 Jan 2025 18:21)  BMI (kg/m2): 30.7 (07 Jan 2025 18:21)  BSA (m2): 2.2 (07 Jan 2025 18:21)    LABS:                        14.0   7.14  )-----------( 214      ( 08 Jan 2025 05:10 )             42.2     01-08    139  |  103  |  23.6[H]  ----------------------------<  107[H]  4.5   |  24.0  |  0.96    Ca    8.9      08 Jan 2025 05:10  Mg     2.4     01-08    TPro  7.1  /  Alb  4.2  /  TBili  0.3[L]  /  DBili  x   /  AST  23  /  ALT  23  /  AlkPhos  58  01-07    LIVER FUNCTIONS - ( 07 Jan 2025 19:20 )  Alb: 4.2 g/dL / Pro: 7.1 g/dL / ALK PHOS: 58 U/L / ALT: 23 U/L / AST: 23 U/L / GGT: x           PT/INR - ( 07 Jan 2025 19:20 )   PT: 10.9 sec;   INR: 0.94 ratio         PTT - ( 07 Jan 2025 19:20 )  PTT:32.9 sec        Urinalysis Basic - ( 08 Jan 2025 05:10 )    Color: x / Appearance: x / SG: x / pH: x  Gluc: 107 mg/dL / Ketone: x  / Bili: x / Urobili: x   Blood: x / Protein: x / Nitrite: x   Leuk Esterase: x / RBC: x / WBC x   Sq Epi: x / Non Sq Epi: x / Bacteria: x              MEDICATIONS  MEDICATIONS  (STANDING):  atorvastatin 40 milliGRAM(s) Oral at bedtime  ezetimibe 10 milliGRAM(s) Oral daily  hydrochlorothiazide 12.5 milliGRAM(s) Oral daily  metoprolol tartrate 12.5 milliGRAM(s) Oral two times a day  pantoprazole    Tablet 40 milliGRAM(s) Oral before breakfast  sodium chloride 0.9% lock flush 3 milliLiter(s) IV Push every 8 hours    MEDICATIONS  (PRN):      ALLERGIES:  No Known Allergies      DIAGNOSTICS:  All laboratory results, radiology and medications reviewed.    PHYSICAL EXAM:  Constitutional: NAD  Neck: supple, trachea midline. No JVD   Respiratory: Breath sounds diminished b/l lower fields to auscultation, no accessory muscle use noted. No wheezing, rales, or rhonchi noted b/l   Cardiovascular: Regular rate, regular rhythm, normal S1, S2; no murmurs or rub   Gastrointestinal: Soft, non-tender, non-distended, hypoactive bowel sounds   Extremities: CLARK x 4, trace LE peripheral edema, no cyanosis, no clubbing    Vascular: Equal and normal pulses: 2+ peripheral pulses throughout  Neurological: A+O x 3; speech clear and intact; no gross sensory/motor deficits  Psychiatric: calm, normal mood, normal affect   Skin: warm, dry, well perfused, no rashes

## 2025-01-09 NOTE — PROGRESS NOTE ADULT - ASSESSMENT
73y/o male with PMHx HTN, asymptomatic bradycardia, h/o frequent PVCs presented to [facility] for elective LHC. Pt reports having outpatient CT calcium scan which was elevated. Pt was referred to [facility] for LHC today, revealing TVD - pLAD 90%, oCx 75%, and %. EF normal at 60%. Pt transferred to [facility] for CABG work up.

## 2025-01-09 NOTE — PROGRESS NOTE ADULT - SUBJECTIVE AND OBJECTIVE BOX
Subjective: Patient seen and examined at bedside, no overnight events. States his anterior shin still hurts.       MEDICATIONS  (STANDING):  atorvastatin 40 milliGRAM(s) Oral at bedtime  chlorhexidine 0.12% Liquid 15 milliLiter(s) Swish and Spit every 12 hours  chlorhexidine 4% Liquid 1 Application(s) Topical two times a day  ezetimibe 10 milliGRAM(s) Oral daily  hydrochlorothiazide 12.5 milliGRAM(s) Oral daily  metoprolol tartrate 12.5 milliGRAM(s) Oral two times a day  pantoprazole    Tablet 40 milliGRAM(s) Oral before breakfast  sodium chloride 0.9% lock flush 3 milliLiter(s) IV Push every 8 hours    MEDICATIONS  (PRN):      Vital Signs Last 24 Hrs  T(C): 36.4 (09 Jan 2025 12:07), Max: 37 (09 Jan 2025 07:37)  T(F): 97.6 (09 Jan 2025 12:07), Max: 98.6 (09 Jan 2025 07:37)  HR: 62 (09 Jan 2025 12:07) (50 - 62)  BP: 152/67 (09 Jan 2025 12:07) (134/63 - 176/78)  BP(mean): --  RR: 18 (09 Jan 2025 12:07) (18 - 18)  SpO2: 96% (09 Jan 2025 12:07) (92% - 98%)    Parameters below as of 09 Jan 2025 12:07  Patient On (Oxygen Delivery Method): room air        Physical Exam:    General: NAD, Lying in bed comfortably  Neuro: A+Ox3  Resp: Non labored breathing on RA  GI/Abd: Soft, NT/ND, no rebound/guarding, no masses palpated  Vascular: All 4 extremities warm and well perfused.   Musculoskeletal: All 4 extremities moving spontaneously, no limitations, no spinal tenderness. RLE edema +1        LABS:                        14.1   7.41  )-----------( 209      ( 09 Jan 2025 04:22 )             42.0     01-09    139  |  101  |  25.3[H]  ----------------------------<  106[H]  4.2   |  26.0  |  0.98    Ca    9.2      09 Jan 2025 04:22  Mg     2.2     01-09    TPro  7.1  /  Alb  4.2  /  TBili  0.3[L]  /  DBili  x   /  AST  23  /  ALT  23  /  AlkPhos  58  01-07    PT/INR - ( 07 Jan 2025 19:20 )   PT: 10.9 sec;   INR: 0.94 ratio         PTT - ( 07 Jan 2025 19:20 )  PTT:32.9 sec  Urinalysis Basic - ( 09 Jan 2025 04:22 )    Color: x / Appearance: x / SG: x / pH: x  Gluc: 106 mg/dL / Ketone: x  / Bili: x / Urobili: x   Blood: x / Protein: x / Nitrite: x   Leuk Esterase: x / RBC: x / WBC x   Sq Epi: x / Non Sq Epi: x / Bacteria: x        A: 73y/o male with PMHx HTN, asymptomatic bradycardia, h/o frequent PVCs presented to [facility] [place] for [place] LHC. Pt reports having outpatient CT calcium scan which was elevated. Pt was referred to [facility] for LHC today, revealing TVD - pLAD 90%, oCx 75%, and %. EF normal at 60%. Pt transferred to [facility] for CABG work up. patient complaining of RLE pain for past month states he had surgery x2 in this leg when he was child. CT findings unremarkable.     Plan:   - no acute surgical intervention at this time due to unremarkable CT  - ACS will sign off, please reconsult if needed

## 2025-01-10 ENCOUNTER — APPOINTMENT (OUTPATIENT)
Dept: CARDIOTHORACIC SURGERY | Facility: HOSPITAL | Age: 75
End: 2025-01-10

## 2025-01-10 LAB
ALBUMIN SERPL ELPH-MCNC: 3.2 G/DL — LOW (ref 3.3–5.2)
ALP SERPL-CCNC: 46 U/L — SIGNIFICANT CHANGE UP (ref 40–120)
ALT FLD-CCNC: 32 U/L — SIGNIFICANT CHANGE UP
ANION GAP SERPL CALC-SCNC: 14 MMOL/L — SIGNIFICANT CHANGE UP (ref 5–17)
ANION GAP SERPL CALC-SCNC: 17 MMOL/L — SIGNIFICANT CHANGE UP (ref 5–17)
APTT BLD: 28.4 SEC — SIGNIFICANT CHANGE UP (ref 24.5–35.6)
AST SERPL-CCNC: 63 U/L — HIGH
BASE EXCESS BLDA CALC-SCNC: -0.1 MMOL/L — SIGNIFICANT CHANGE UP (ref -2–3)
BASE EXCESS BLDA CALC-SCNC: -2.1 MMOL/L — LOW (ref -2–3)
BASE EXCESS BLDA CALC-SCNC: -2.1 MMOL/L — LOW (ref -2–3)
BASE EXCESS BLDA CALC-SCNC: -3.1 MMOL/L — LOW (ref -2–3)
BASE EXCESS BLDA CALC-SCNC: -3.3 MMOL/L — LOW (ref -2–3)
BASE EXCESS BLDA CALC-SCNC: -5.6 MMOL/L — LOW (ref -2–3)
BASE EXCESS BLDA CALC-SCNC: 0.1 MMOL/L — SIGNIFICANT CHANGE UP (ref -2–3)
BASE EXCESS BLDA CALC-SCNC: 3.1 MMOL/L — HIGH (ref -2–3)
BASOPHILS # BLD AUTO: 0 K/UL — SIGNIFICANT CHANGE UP (ref 0–0.2)
BASOPHILS NFR BLD AUTO: 0 % — SIGNIFICANT CHANGE UP (ref 0–2)
BILIRUB SERPL-MCNC: 0.9 MG/DL — SIGNIFICANT CHANGE UP (ref 0.4–2)
BUN SERPL-MCNC: 25.8 MG/DL — HIGH (ref 8–20)
BUN SERPL-MCNC: 27.2 MG/DL — HIGH (ref 8–20)
CA-I BLDA-SCNC: 0.98 MMOL/L — LOW (ref 1.15–1.33)
CA-I BLDA-SCNC: 0.99 MMOL/L — LOW (ref 1.15–1.33)
CA-I BLDA-SCNC: 1.03 MMOL/L — LOW (ref 1.15–1.33)
CA-I BLDA-SCNC: 1.14 MMOL/L — LOW (ref 1.15–1.33)
CA-I BLDA-SCNC: 1.21 MMOL/L — SIGNIFICANT CHANGE UP (ref 1.15–1.33)
CA-I BLDA-SCNC: 1.22 MMOL/L — SIGNIFICANT CHANGE UP (ref 1.15–1.33)
CA-I BLDA-SCNC: 1.3 MMOL/L — SIGNIFICANT CHANGE UP (ref 1.15–1.33)
CA-I BLDA-SCNC: 1.57 MMOL/L — HIGH (ref 1.15–1.33)
CALCIUM SERPL-MCNC: 9 MG/DL — SIGNIFICANT CHANGE UP (ref 8.4–10.5)
CALCIUM SERPL-MCNC: 9.3 MG/DL — SIGNIFICANT CHANGE UP (ref 8.4–10.5)
CHLORIDE BLDA-SCNC: 102 MMOL/L — SIGNIFICANT CHANGE UP (ref 96–108)
CHLORIDE BLDA-SCNC: 103 MMOL/L — SIGNIFICANT CHANGE UP (ref 96–108)
CHLORIDE BLDA-SCNC: 104 MMOL/L — SIGNIFICANT CHANGE UP (ref 96–108)
CHLORIDE BLDA-SCNC: 105 MMOL/L — SIGNIFICANT CHANGE UP (ref 96–108)
CHLORIDE BLDA-SCNC: 105 MMOL/L — SIGNIFICANT CHANGE UP (ref 96–108)
CHLORIDE BLDA-SCNC: 107 MMOL/L — SIGNIFICANT CHANGE UP (ref 96–108)
CHLORIDE SERPL-SCNC: 101 MMOL/L — SIGNIFICANT CHANGE UP (ref 96–108)
CHLORIDE SERPL-SCNC: 104 MMOL/L — SIGNIFICANT CHANGE UP (ref 96–108)
CK MB CFR SERPL CALC: 40.8 NG/ML — HIGH (ref 0–6.7)
CK SERPL-CCNC: 309 U/L — HIGH (ref 30–200)
CO2 SERPL-SCNC: 23 MMOL/L — SIGNIFICANT CHANGE UP (ref 22–29)
CO2 SERPL-SCNC: 25 MMOL/L — SIGNIFICANT CHANGE UP (ref 22–29)
COHGB MFR BLDA: 1.2 % — SIGNIFICANT CHANGE UP
COHGB MFR BLDA: 1.3 % — SIGNIFICANT CHANGE UP
COHGB MFR BLDA: 1.4 % — SIGNIFICANT CHANGE UP
COHGB MFR BLDA: 1.5 % — SIGNIFICANT CHANGE UP
COHGB MFR BLDA: 1.5 % — SIGNIFICANT CHANGE UP
COHGB MFR BLDA: 1.6 % — SIGNIFICANT CHANGE UP
COHGB MFR BLDA: 1.6 % — SIGNIFICANT CHANGE UP
COHGB MFR BLDA: 1.8 % — SIGNIFICANT CHANGE UP
CREAT SERPL-MCNC: 1.04 MG/DL — SIGNIFICANT CHANGE UP (ref 0.5–1.3)
CREAT SERPL-MCNC: 1.05 MG/DL — SIGNIFICANT CHANGE UP (ref 0.5–1.3)
EGFR: 74 ML/MIN/1.73M2 — SIGNIFICANT CHANGE UP
EGFR: 74 ML/MIN/1.73M2 — SIGNIFICANT CHANGE UP
EOSINOPHIL # BLD AUTO: 0 K/UL — SIGNIFICANT CHANGE UP (ref 0–0.5)
EOSINOPHIL NFR BLD AUTO: 0 % — SIGNIFICANT CHANGE UP (ref 0–6)
FIBRINOGEN PPP-MCNC: 315 MG/DL — SIGNIFICANT CHANGE UP (ref 200–450)
GAS PNL BLDA: SIGNIFICANT CHANGE UP
GLUCOSE BLDA-MCNC: 105 MG/DL — HIGH (ref 70–99)
GLUCOSE BLDA-MCNC: 147 MG/DL — HIGH (ref 70–99)
GLUCOSE BLDA-MCNC: 154 MG/DL — HIGH (ref 70–99)
GLUCOSE BLDA-MCNC: 167 MG/DL — HIGH (ref 70–99)
GLUCOSE BLDA-MCNC: 173 MG/DL — HIGH (ref 70–99)
GLUCOSE BLDA-MCNC: 178 MG/DL — HIGH (ref 70–99)
GLUCOSE BLDA-MCNC: 180 MG/DL — HIGH (ref 70–99)
GLUCOSE BLDA-MCNC: 199 MG/DL — HIGH (ref 70–99)
GLUCOSE SERPL-MCNC: 107 MG/DL — HIGH (ref 70–99)
GLUCOSE SERPL-MCNC: 179 MG/DL — HIGH (ref 70–99)
HCO3 BLDA-SCNC: 21 MMOL/L — SIGNIFICANT CHANGE UP (ref 21–28)
HCO3 BLDA-SCNC: 22 MMOL/L — SIGNIFICANT CHANGE UP (ref 21–28)
HCO3 BLDA-SCNC: 23 MMOL/L — SIGNIFICANT CHANGE UP (ref 21–28)
HCO3 BLDA-SCNC: 24 MMOL/L — SIGNIFICANT CHANGE UP (ref 21–28)
HCO3 BLDA-SCNC: 24 MMOL/L — SIGNIFICANT CHANGE UP (ref 21–28)
HCO3 BLDA-SCNC: 26 MMOL/L — SIGNIFICANT CHANGE UP (ref 21–28)
HCO3 BLDA-SCNC: 26 MMOL/L — SIGNIFICANT CHANGE UP (ref 21–28)
HCO3 BLDA-SCNC: 27 MMOL/L — SIGNIFICANT CHANGE UP (ref 21–28)
HCT VFR BLD CALC: 36.2 % — LOW (ref 39–50)
HCT VFR BLD CALC: 40.4 % — SIGNIFICANT CHANGE UP (ref 39–50)
HCT VFR BLDA CALC: 32 % — SIGNIFICANT CHANGE UP
HCT VFR BLDA CALC: 33 % — SIGNIFICANT CHANGE UP
HCT VFR BLDA CALC: 34 % — SIGNIFICANT CHANGE UP
HCT VFR BLDA CALC: 34 % — SIGNIFICANT CHANGE UP
HCT VFR BLDA CALC: 35 % — SIGNIFICANT CHANGE UP
HCT VFR BLDA CALC: 37 % — SIGNIFICANT CHANGE UP
HCT VFR BLDA CALC: 40 % — SIGNIFICANT CHANGE UP
HCT VFR BLDA CALC: 45 % — SIGNIFICANT CHANGE UP
HGB BLD-MCNC: 12.4 G/DL — LOW (ref 13–17)
HGB BLD-MCNC: 13.6 G/DL — SIGNIFICANT CHANGE UP (ref 13–17)
HGB BLDA-MCNC: 10.5 G/DL — LOW (ref 12.6–17.4)
HGB BLDA-MCNC: 11.1 G/DL — LOW (ref 12.6–17.4)
HGB BLDA-MCNC: 11.2 G/DL — LOW (ref 12.6–17.4)
HGB BLDA-MCNC: 11.4 G/DL — LOW (ref 12.6–17.4)
HGB BLDA-MCNC: 11.7 G/DL — LOW (ref 12.6–17.4)
HGB BLDA-MCNC: 12.2 G/DL — LOW (ref 12.6–17.4)
HGB BLDA-MCNC: 13.4 G/DL — SIGNIFICANT CHANGE UP (ref 12.6–17.4)
HGB BLDA-MCNC: 15 G/DL — SIGNIFICANT CHANGE UP (ref 12.6–17.4)
INR BLD: 1.12 RATIO — SIGNIFICANT CHANGE UP (ref 0.85–1.16)
LACTATE BLDA-MCNC: 1.3 MMOL/L — SIGNIFICANT CHANGE UP (ref 0.5–2)
LACTATE BLDA-MCNC: 1.5 MMOL/L — SIGNIFICANT CHANGE UP (ref 0.5–2)
LACTATE BLDA-MCNC: 2.2 MMOL/L — HIGH (ref 0.5–2)
LACTATE BLDA-MCNC: 2.8 MMOL/L — HIGH (ref 0.5–2)
LACTATE BLDA-MCNC: 3.1 MMOL/L — HIGH (ref 0.5–2)
LACTATE BLDA-MCNC: 3.2 MMOL/L — HIGH (ref 0.5–2)
LACTATE BLDA-MCNC: 3.6 MMOL/L — HIGH (ref 0.5–2)
LACTATE BLDA-MCNC: 3.8 MMOL/L — HIGH (ref 0.5–2)
LYMPHOCYTES # BLD AUTO: 0 % — LOW (ref 13–44)
LYMPHOCYTES # BLD AUTO: 0 K/UL — LOW (ref 1–3.3)
MAGNESIUM SERPL-MCNC: 2 MG/DL — SIGNIFICANT CHANGE UP (ref 1.8–2.6)
MANUAL SMEAR VERIFICATION: SIGNIFICANT CHANGE UP
MCHC RBC-ENTMCNC: 29 PG — SIGNIFICANT CHANGE UP (ref 27–34)
MCHC RBC-ENTMCNC: 29.7 PG — SIGNIFICANT CHANGE UP (ref 27–34)
MCHC RBC-ENTMCNC: 33.7 G/DL — SIGNIFICANT CHANGE UP (ref 32–36)
MCHC RBC-ENTMCNC: 34.3 G/DL — SIGNIFICANT CHANGE UP (ref 32–36)
MCV RBC AUTO: 86.1 FL — SIGNIFICANT CHANGE UP (ref 80–100)
MCV RBC AUTO: 86.8 FL — SIGNIFICANT CHANGE UP (ref 80–100)
METHGB MFR BLDA: 1 % — SIGNIFICANT CHANGE UP
METHGB MFR BLDA: 1.1 % — SIGNIFICANT CHANGE UP
METHGB MFR BLDA: 1.2 % — SIGNIFICANT CHANGE UP
METHGB MFR BLDA: 1.3 % — SIGNIFICANT CHANGE UP
MONOCYTES # BLD AUTO: 0.16 K/UL — SIGNIFICANT CHANGE UP (ref 0–0.9)
MONOCYTES NFR BLD AUTO: 0.9 % — LOW (ref 2–14)
NEUTROPHILS # BLD AUTO: 18 K/UL — HIGH (ref 1.8–7.4)
NEUTROPHILS NFR BLD AUTO: 99.1 % — HIGH (ref 43–77)
OXYHGB MFR BLDA: 97 % — HIGH (ref 90–95)
OXYHGB MFR BLDA: 98 % — HIGH (ref 90–95)
PCO2 BLDA: 38 MMHG — SIGNIFICANT CHANGE UP (ref 35–48)
PCO2 BLDA: 39 MMHG — SIGNIFICANT CHANGE UP (ref 35–48)
PCO2 BLDA: 44 MMHG — SIGNIFICANT CHANGE UP (ref 35–48)
PCO2 BLDA: 45 MMHG — SIGNIFICANT CHANGE UP (ref 35–48)
PCO2 BLDA: 46 MMHG — SIGNIFICANT CHANGE UP (ref 35–48)
PCO2 BLDA: 48 MMHG — SIGNIFICANT CHANGE UP (ref 35–48)
PCO2 BLDA: 51 MMHG — HIGH (ref 35–48)
PCO2 BLDA: 52 MMHG — HIGH (ref 35–48)
PH BLDA: 7.28 — LOW (ref 7.35–7.45)
PH BLDA: 7.29 — LOW (ref 7.35–7.45)
PH BLDA: 7.31 — LOW (ref 7.35–7.45)
PH BLDA: 7.31 — LOW (ref 7.35–7.45)
PH BLDA: 7.34 — LOW (ref 7.35–7.45)
PH BLDA: 7.34 — LOW (ref 7.35–7.45)
PH BLDA: 7.37 — SIGNIFICANT CHANGE UP (ref 7.35–7.45)
PH BLDA: 7.45 — SIGNIFICANT CHANGE UP (ref 7.35–7.45)
PLAT MORPH BLD: NORMAL — SIGNIFICANT CHANGE UP
PLATELET # BLD AUTO: 163 K/UL — SIGNIFICANT CHANGE UP (ref 150–400)
PLATELET # BLD AUTO: 219 K/UL — SIGNIFICANT CHANGE UP (ref 150–400)
PO2 BLDA: 139 MMHG — HIGH (ref 83–108)
PO2 BLDA: 285 MMHG — HIGH (ref 83–108)
PO2 BLDA: 307 MMHG — HIGH (ref 83–108)
PO2 BLDA: 396 MMHG — HIGH (ref 83–108)
PO2 BLDA: 401 MMHG — HIGH (ref 83–108)
PO2 BLDA: 440 MMHG — HIGH (ref 83–108)
PO2 BLDA: 466 MMHG — HIGH (ref 83–108)
PO2 BLDA: 466 MMHG — HIGH (ref 83–108)
POIKILOCYTOSIS BLD QL AUTO: SLIGHT — SIGNIFICANT CHANGE UP
POTASSIUM BLDA-SCNC: 3.8 MMOL/L — SIGNIFICANT CHANGE UP (ref 3.5–5.1)
POTASSIUM BLDA-SCNC: 3.9 MMOL/L — SIGNIFICANT CHANGE UP (ref 3.5–5.1)
POTASSIUM BLDA-SCNC: 4 MMOL/L — SIGNIFICANT CHANGE UP (ref 3.5–5.1)
POTASSIUM BLDA-SCNC: 4.6 MMOL/L — SIGNIFICANT CHANGE UP (ref 3.5–5.1)
POTASSIUM BLDA-SCNC: 4.8 MMOL/L — SIGNIFICANT CHANGE UP (ref 3.5–5.1)
POTASSIUM BLDA-SCNC: 5 MMOL/L — SIGNIFICANT CHANGE UP (ref 3.5–5.1)
POTASSIUM BLDA-SCNC: 5.5 MMOL/L — HIGH (ref 3.5–5.1)
POTASSIUM BLDA-SCNC: 5.8 MMOL/L — HIGH (ref 3.5–5.1)
POTASSIUM SERPL-MCNC: 4.2 MMOL/L — SIGNIFICANT CHANGE UP (ref 3.5–5.3)
POTASSIUM SERPL-MCNC: 4.3 MMOL/L — SIGNIFICANT CHANGE UP (ref 3.5–5.3)
POTASSIUM SERPL-SCNC: 4.2 MMOL/L — SIGNIFICANT CHANGE UP (ref 3.5–5.3)
POTASSIUM SERPL-SCNC: 4.3 MMOL/L — SIGNIFICANT CHANGE UP (ref 3.5–5.3)
PROT SERPL-MCNC: 5.4 G/DL — LOW (ref 6.6–8.7)
PROTHROM AB SERPL-ACNC: 12.6 SEC — SIGNIFICANT CHANGE UP (ref 9.9–13.4)
RBC # BLD: 4.17 M/UL — LOW (ref 4.2–5.8)
RBC # BLD: 4.69 M/UL — SIGNIFICANT CHANGE UP (ref 4.2–5.8)
RBC # FLD: 12.9 % — SIGNIFICANT CHANGE UP (ref 10.3–14.5)
RBC # FLD: 13.1 % — SIGNIFICANT CHANGE UP (ref 10.3–14.5)
RBC BLD AUTO: ABNORMAL
SAO2 % BLDA: 100 % — HIGH (ref 94–98)
SAO2 % BLDA: 99.9 % — HIGH (ref 94–98)
SODIUM BLDA-SCNC: 133 MMOL/L — LOW (ref 136–145)
SODIUM BLDA-SCNC: 135 MMOL/L — LOW (ref 136–145)
SODIUM BLDA-SCNC: 136 MMOL/L — SIGNIFICANT CHANGE UP (ref 136–145)
SODIUM SERPL-SCNC: 140 MMOL/L — SIGNIFICANT CHANGE UP (ref 135–145)
SODIUM SERPL-SCNC: 143 MMOL/L — SIGNIFICANT CHANGE UP (ref 135–145)
TROPONIN T, HIGH SENSITIVITY RESULT: 982 NG/L — HIGH (ref 0–51)
WBC # BLD: 18.16 K/UL — HIGH (ref 3.8–10.5)
WBC # BLD: 7.2 K/UL — SIGNIFICANT CHANGE UP (ref 3.8–10.5)
WBC # FLD AUTO: 18.16 K/UL — HIGH (ref 3.8–10.5)
WBC # FLD AUTO: 7.2 K/UL — SIGNIFICANT CHANGE UP (ref 3.8–10.5)

## 2025-01-10 PROCEDURE — 33508 ENDOSCOPIC VEIN HARVEST: CPT | Mod: 59

## 2025-01-10 PROCEDURE — 71045 X-RAY EXAM CHEST 1 VIEW: CPT | Mod: 26

## 2025-01-10 PROCEDURE — 33518 CABG ARTERY-VEIN TWO: CPT | Mod: AS

## 2025-01-10 PROCEDURE — 93010 ELECTROCARDIOGRAM REPORT: CPT

## 2025-01-10 PROCEDURE — 33533 CABG ARTERIAL SINGLE: CPT

## 2025-01-10 PROCEDURE — 76998 US GUIDE INTRAOP: CPT | Mod: 26,NC,AS,59

## 2025-01-10 PROCEDURE — 76998 US GUIDE INTRAOP: CPT | Mod: 26,59

## 2025-01-10 PROCEDURE — 33518 CABG ARTERY-VEIN TWO: CPT

## 2025-01-10 PROCEDURE — 99291 CRITICAL CARE FIRST HOUR: CPT

## 2025-01-10 PROCEDURE — 33533 CABG ARTERIAL SINGLE: CPT | Mod: AS

## 2025-01-10 DEVICE — CATH INFUS SL 7FR 20CM: Type: IMPLANTABLE DEVICE | Status: FUNCTIONAL

## 2025-01-10 DEVICE — CANNULA VESSEL 3MM BLUNT TIP CLEAR 1-WAY VALVE: Type: IMPLANTABLE DEVICE | Status: FUNCTIONAL

## 2025-01-10 DEVICE — CANNULA VENOUS 2 STAGE OPEN LIGHTHOUSE TIP 32-40FR X 1/2" NON-VENTED: Type: IMPLANTABLE DEVICE | Status: FUNCTIONAL

## 2025-01-10 DEVICE — CHEST DRAIN PLEUR-EVAC 32FR STRAIGHT: Type: IMPLANTABLE DEVICE | Status: FUNCTIONAL

## 2025-01-10 DEVICE — CANNULA ARTERIAL SOFT-FLOW 24FR EXTENDED VENTED: Type: IMPLANTABLE DEVICE | Status: FUNCTIONAL

## 2025-01-10 DEVICE — OCCLUDER INTERNAL VESSEL FLO-RESTER 1 X 12MM: Type: IMPLANTABLE DEVICE | Status: FUNCTIONAL

## 2025-01-10 DEVICE — KIT CVC 2LUM MAC 9FR CHG: Type: IMPLANTABLE DEVICE | Status: FUNCTIONAL

## 2025-01-10 DEVICE — PACING WIRE WHITE M-25 WINGED WIRE 37MM X 89MM: Type: IMPLANTABLE DEVICE | Status: FUNCTIONAL

## 2025-01-10 DEVICE — PACING WIRE ORANGE M-25 WINGED WIRE 37MM X 89MM: Type: IMPLANTABLE DEVICE | Status: FUNCTIONAL

## 2025-01-10 DEVICE — SURGIFLO MATRIX WITH THROMBIN KIT: Type: IMPLANTABLE DEVICE | Status: FUNCTIONAL

## 2025-01-10 DEVICE — CHEST DRAIN PLEUR-EVAC 28FR RIGHT ANGLE: Type: IMPLANTABLE DEVICE | Status: FUNCTIONAL

## 2025-01-10 DEVICE — KIT A-LINE 1LUM 18G X 16CM: Type: IMPLANTABLE DEVICE | Status: FUNCTIONAL

## 2025-01-10 DEVICE — CHEST DRAIN THORACIC ARGYLE PVC 32FR RIGHT ANGLE: Type: IMPLANTABLE DEVICE | Status: FUNCTIONAL

## 2025-01-10 DEVICE — CANNULA RETROGRADE CARDIOPLEGIA SELF-INFLATING 14FR PRE-SHAPED STYLET/HANDLE: Type: IMPLANTABLE DEVICE | Status: FUNCTIONAL

## 2025-01-10 DEVICE — CANNULA AORTIC ROOT 14G X 14CM FLANGED: Type: IMPLANTABLE DEVICE | Status: FUNCTIONAL

## 2025-01-10 DEVICE — HEARTSTRING III PROXIMAL SEAL SYSTEM: Type: IMPLANTABLE DEVICE | Status: FUNCTIONAL

## 2025-01-10 DEVICE — KIT A-LINE 1LUM 20G X 12CM SAFE KIT: Type: IMPLANTABLE DEVICE | Status: FUNCTIONAL

## 2025-01-10 RX ORDER — VANCOMYCIN HYDROCHLORIDE 5 G/100ML
1000 INJECTION, POWDER, LYOPHILIZED, FOR SOLUTION INTRAVENOUS EVERY 12 HOURS
Refills: 0 | Status: COMPLETED | OUTPATIENT
Start: 2025-01-11 | End: 2025-01-12

## 2025-01-10 RX ORDER — ASCORBIC ACID 1000 MG
500 TABLET ORAL
Refills: 0 | Status: DISCONTINUED | OUTPATIENT
Start: 2025-01-10 | End: 2025-01-15

## 2025-01-10 RX ORDER — SODIUM CHLORIDE 9 MG/ML
1000 INJECTION, SOLUTION INTRAMUSCULAR; INTRAVENOUS; SUBCUTANEOUS
Refills: 0 | Status: DISCONTINUED | OUTPATIENT
Start: 2025-01-10 | End: 2025-01-15

## 2025-01-10 RX ORDER — SODIUM CHLORIDE 9 MG/ML
10 INJECTION, SOLUTION INTRAMUSCULAR; INTRAVENOUS; SUBCUTANEOUS
Refills: 0 | Status: DISCONTINUED | OUTPATIENT
Start: 2025-01-10 | End: 2025-01-15

## 2025-01-10 RX ORDER — DEXTROSE MONOHYDRATE 25 G/50ML
50 INJECTION, SOLUTION INTRAVENOUS
Refills: 0 | Status: DISCONTINUED | OUTPATIENT
Start: 2025-01-10 | End: 2025-01-15

## 2025-01-10 RX ORDER — HYDROMORPHONE HCL 4 MG
0.5 TABLET ORAL EVERY 6 HOURS
Refills: 0 | Status: DISCONTINUED | OUTPATIENT
Start: 2025-01-10 | End: 2025-01-11

## 2025-01-10 RX ORDER — CHLORHEXIDINE GLUCONATE 1.2 MG/ML
15 RINSE ORAL EVERY 12 HOURS
Refills: 0 | Status: DISCONTINUED | OUTPATIENT
Start: 2025-01-10 | End: 2025-01-15

## 2025-01-10 RX ORDER — POTASSIUM CHLORIDE 600 MG/1
10 TABLET, FILM COATED, EXTENDED RELEASE ORAL
Refills: 0 | Status: DISCONTINUED | OUTPATIENT
Start: 2025-01-10 | End: 2025-01-10

## 2025-01-10 RX ORDER — ACETAMINOPHEN 80 MG/.8ML
1000 SOLUTION/ DROPS ORAL EVERY 6 HOURS
Refills: 0 | Status: COMPLETED | OUTPATIENT
Start: 2025-01-11 | End: 2025-01-11

## 2025-01-10 RX ORDER — PANTOPRAZOLE 40 MG/1
40 TABLET, DELAYED RELEASE ORAL DAILY
Refills: 0 | Status: DISCONTINUED | OUTPATIENT
Start: 2025-01-11 | End: 2025-01-15

## 2025-01-10 RX ORDER — POLYETHYLENE GLYCOL 3350 17 G/DOSE
17 POWDER (GRAM) ORAL DAILY
Refills: 0 | Status: DISCONTINUED | OUTPATIENT
Start: 2025-01-11 | End: 2025-01-15

## 2025-01-10 RX ORDER — ASPIRIN 81 MG
325 TABLET, DELAYED RELEASE (ENTERIC COATED) ORAL DAILY
Refills: 0 | Status: DISCONTINUED | OUTPATIENT
Start: 2025-01-11 | End: 2025-01-15

## 2025-01-10 RX ORDER — NICARDIPINE HYDROCHLORIDE 2.5 MG/ML
5 INJECTION INTRAVENOUS
Qty: 40 | Refills: 0 | Status: DISCONTINUED | OUTPATIENT
Start: 2025-01-10 | End: 2025-01-11

## 2025-01-10 RX ORDER — INSULIN HUMAN 100 [IU]/ML
2 INJECTION, SOLUTION SUBCUTANEOUS
Qty: 100 | Refills: 0 | Status: DISCONTINUED | OUTPATIENT
Start: 2025-01-10 | End: 2025-01-12

## 2025-01-10 RX ORDER — SENNOSIDES 8.6 MG/1
2 TABLET, FILM COATED ORAL AT BEDTIME
Refills: 0 | Status: DISCONTINUED | OUTPATIENT
Start: 2025-01-11 | End: 2025-01-15

## 2025-01-10 RX ORDER — ASPIRIN 81 MG
325 TABLET, DELAYED RELEASE (ENTERIC COATED) ORAL ONCE
Refills: 0 | Status: COMPLETED | OUTPATIENT
Start: 2025-01-11 | End: 2025-01-11

## 2025-01-10 RX ORDER — CHLORHEXIDINE GLUCONATE 1.2 MG/ML
1 RINSE ORAL
Refills: 0 | Status: DISCONTINUED | OUTPATIENT
Start: 2025-01-10 | End: 2025-01-15

## 2025-01-10 RX ORDER — AMIODARONE HYDROCHLORIDE 200 MG/1
400 TABLET ORAL
Refills: 0 | Status: DISCONTINUED | OUTPATIENT
Start: 2025-01-10 | End: 2025-01-11

## 2025-01-10 RX ORDER — DEXTROSE MONOHYDRATE 25 G/50ML
25 INJECTION, SOLUTION INTRAVENOUS
Refills: 0 | Status: DISCONTINUED | OUTPATIENT
Start: 2025-01-10 | End: 2025-01-15

## 2025-01-10 RX ORDER — GABAPENTIN 300 MG/1
100 CAPSULE ORAL EVERY 8 HOURS
Refills: 0 | Status: COMPLETED | OUTPATIENT
Start: 2025-01-10 | End: 2025-01-15

## 2025-01-10 RX ORDER — POTASSIUM CHLORIDE 600 MG/1
10 TABLET, FILM COATED, EXTENDED RELEASE ORAL
Refills: 0 | Status: COMPLETED | OUTPATIENT
Start: 2025-01-10 | End: 2025-01-10

## 2025-01-10 RX ORDER — MEPERIDINE HYDROCHLORIDE 50 MG/ML
25 INJECTION, SOLUTION INTRAMUSCULAR; INTRAVENOUS; SUBCUTANEOUS ONCE
Refills: 0 | Status: DISCONTINUED | OUTPATIENT
Start: 2025-01-10 | End: 2025-01-10

## 2025-01-10 RX ORDER — PROPOFOL 10 MG/ML
10 INJECTION, EMULSION INTRAVENOUS
Qty: 1000 | Refills: 0 | Status: DISCONTINUED | OUTPATIENT
Start: 2025-01-10 | End: 2025-01-10

## 2025-01-10 RX ORDER — CHLORHEXIDINE GLUCONATE 1.2 MG/ML
1 RINSE ORAL DAILY
Refills: 0 | Status: DISCONTINUED | OUTPATIENT
Start: 2025-01-10 | End: 2025-01-15

## 2025-01-10 RX ORDER — ACETAMINOPHEN 80 MG/.8ML
650 SOLUTION/ DROPS ORAL EVERY 6 HOURS
Refills: 0 | Status: DISCONTINUED | OUTPATIENT
Start: 2025-01-13 | End: 2025-01-15

## 2025-01-10 RX ORDER — PANTOPRAZOLE 40 MG/1
40 TABLET, DELAYED RELEASE ORAL ONCE
Refills: 0 | Status: COMPLETED | OUTPATIENT
Start: 2025-01-10 | End: 2025-01-10

## 2025-01-10 RX ORDER — CEFUROXIME AXETIL 250 MG
1500 TABLET ORAL EVERY 8 HOURS
Refills: 0 | Status: COMPLETED | OUTPATIENT
Start: 2025-01-10 | End: 2025-01-12

## 2025-01-10 RX ORDER — BISACODYL 5 MG
10 TABLET, DELAYED RELEASE (ENTERIC COATED) ORAL ONCE
Refills: 0 | Status: DISCONTINUED | OUTPATIENT
Start: 2025-01-12 | End: 2025-01-15

## 2025-01-10 RX ORDER — OXYCODONE HCL 15 MG
5 TABLET ORAL EVERY 4 HOURS
Refills: 0 | Status: DISCONTINUED | OUTPATIENT
Start: 2025-01-10 | End: 2025-01-15

## 2025-01-10 RX ORDER — OXYCODONE HCL 15 MG
10 TABLET ORAL EVERY 4 HOURS
Refills: 0 | Status: DISCONTINUED | OUTPATIENT
Start: 2025-01-10 | End: 2025-01-15

## 2025-01-10 RX ORDER — NOREPINEPHRINE BITARTRATE 1 MG/ML
0.05 INJECTION INTRAVENOUS
Qty: 8 | Refills: 0 | Status: DISCONTINUED | OUTPATIENT
Start: 2025-01-10 | End: 2025-01-11

## 2025-01-10 RX ORDER — ACETAMINOPHEN 80 MG/.8ML
975 SOLUTION/ DROPS ORAL EVERY 6 HOURS
Refills: 0 | Status: COMPLETED | OUTPATIENT
Start: 2025-01-11 | End: 2025-01-13

## 2025-01-10 RX ADMIN — NOREPINEPHRINE BITARTRATE 9.36 MICROGRAM(S)/KG/MIN: 1 INJECTION INTRAVENOUS at 21:20

## 2025-01-10 RX ADMIN — POTASSIUM CHLORIDE 100 MILLIEQUIVALENT(S): 600 TABLET, FILM COATED, EXTENDED RELEASE ORAL at 22:17

## 2025-01-10 RX ADMIN — Medication 100 MILLIGRAM(S): at 22:28

## 2025-01-10 RX ADMIN — Medication 12.5 MILLIGRAM(S): at 05:07

## 2025-01-10 RX ADMIN — Medication 125 MILLILITER(S): at 23:17

## 2025-01-10 RX ADMIN — CHLORHEXIDINE GLUCONATE 15 MILLILITER(S): 1.2 RINSE ORAL at 05:07

## 2025-01-10 RX ADMIN — POTASSIUM CHLORIDE 100 MILLIEQUIVALENT(S): 600 TABLET, FILM COATED, EXTENDED RELEASE ORAL at 21:33

## 2025-01-10 RX ADMIN — Medication 0.5 MILLIGRAM(S): at 23:50

## 2025-01-10 RX ADMIN — POTASSIUM CHLORIDE 100 MILLIEQUIVALENT(S): 600 TABLET, FILM COATED, EXTENDED RELEASE ORAL at 21:20

## 2025-01-10 RX ADMIN — SODIUM CHLORIDE 3 MILLILITER(S): 9 INJECTION, SOLUTION INTRAMUSCULAR; INTRAVENOUS; SUBCUTANEOUS at 05:05

## 2025-01-10 RX ADMIN — Medication 250 MILLILITER(S): at 23:19

## 2025-01-10 RX ADMIN — ACETAMINOPHEN 400 MILLIGRAM(S): 80 SOLUTION/ DROPS ORAL at 22:27

## 2025-01-10 RX ADMIN — POTASSIUM CHLORIDE 100 MILLIEQUIVALENT(S): 600 TABLET, FILM COATED, EXTENDED RELEASE ORAL at 22:27

## 2025-01-10 RX ADMIN — INSULIN HUMAN 2 UNIT(S)/HR: 100 INJECTION, SOLUTION SUBCUTANEOUS at 21:20

## 2025-01-10 RX ADMIN — PANTOPRAZOLE 40 MILLIGRAM(S): 40 TABLET, DELAYED RELEASE ORAL at 22:24

## 2025-01-10 RX ADMIN — CHLORHEXIDINE GLUCONATE 1 APPLICATION(S): 1.2 RINSE ORAL at 05:04

## 2025-01-10 RX ADMIN — PANTOPRAZOLE 40 MILLIGRAM(S): 40 TABLET, DELAYED RELEASE ORAL at 05:07

## 2025-01-10 NOTE — PROGRESS NOTE ADULT - SUBJECTIVE AND OBJECTIVE BOX
CRITICAL CARE ATTENDING - CTICU    MEDICATIONS  (STANDING):  aMIOdarone    Tablet 400 milliGRAM(s) Oral two times a day  ascorbic acid 500 milliGRAM(s) Oral two times a day  cefuroxime  IVPB 1500 milliGRAM(s) IV Intermittent every 8 hours  chlorhexidine 0.12% Liquid 15 milliLiter(s) Oral Mucosa every 12 hours  chlorhexidine 2% Cloths 1 Application(s) Topical daily  chlorhexidine 4% Liquid 1 Application(s) Topical <User Schedule>  dextrose 50% Injectable 50 milliLiter(s) IV Push every 15 minutes  dextrose 50% Injectable 25 milliLiter(s) IV Push every 15 minutes  gabapentin 100 milliGRAM(s) Oral every 8 hours  insulin regular Infusion 2 Unit(s)/Hr (2 mL/Hr) IV Continuous <Continuous>  meperidine     Injectable 25 milliGRAM(s) IV Push once  niCARdipine Infusion 5 mG/Hr (25 mL/Hr) IV Continuous <Continuous>  norepinephrine Infusion 0.05 MICROgram(s)/kG/Min (9.36 mL/Hr) IV Continuous <Continuous>  pantoprazole  Injectable 40 milliGRAM(s) IV Push once  potassium chloride  10 mEq/50 mL IVPB 10 milliEquivalent(s) IV Intermittent every 1 hour  potassium chloride  10 mEq/50 mL IVPB 10 milliEquivalent(s) IV Intermittent every 1 hour  potassium chloride  10 mEq/50 mL IVPB 10 milliEquivalent(s) IV Intermittent every 1 hour  propofol Infusion 10 MICROgram(s)/kG/Min (5.99 mL/Hr) IV Continuous <Continuous>  sodium chloride 0.9%. 1000 milliLiter(s) (10 mL/Hr) IV Continuous <Continuous>  sodium chloride 0.9%. 1000 milliLiter(s) (5 mL/Hr) IV Continuous <Continuous>  vancomycin  IVPB 1500 milliGRAM(s) IV Intermittent once                                    13.6   7.20  )-----------( 219      ( 10 Sae 2025 04:40 )             40.4       01-10    140  |  101  |  25.8[H]  ----------------------------<  107[H]  4.2   |  25.0  |  1.05    Ca    9.3      10 Sae 2025 04:40  Mg     2.0     01-10            Mode: AC/ CMV (Assist Control/ Continuous Mandatory Ventilation)  RR (machine): 12  TV (machine): 600  FiO2: 100  PEEP: 6  MAP: 10  PIP: 25      Daily     Daily Weight in k.2 (10 Sae 2025 06:31)       @ 07:01  -  01-10 @ 07:00  --------------------------------------------------------  IN: 640 mL / OUT: 0 mL / NET: 640 mL        Critically Ill patient  : [ ] preoperative ,   [x ] post operative    Requires :  [ x] Arterial Line   [x ] Central Line  [ ] PA catheter  [ ] IABP  [ ] ECMO  [ ] LVAD  [ x] Ventilator  [x ] pacemaker - TPM  [ ] Impella.                      [x ] ABG's     [ x] Pulse Oxymetry Monitoring  Bedside evaluation , monitoring , treatment of hemodynamics , fluids , IVP/ IVCD meds.        Diagnosis:     Op day - CABG X 3 L     Hypotension     Hypovolemia     Prerenal Azotemia     Hemodynamic lability,  instability. Requires IVCD [ x] vasopressors [ ] inotropes  [ ] vasodilator  [x ]IVSS fluid  to maintain MAP, perfusion, C.I.     Respiratory insuffiencey     Remains intubated post op     Requires Supplemental Oxygen Therapy     Ventilator Management:  [x ]AC-rest post op    [ x]CPAP-PS Wean  tonight    [ ]Trach Collar     [ ]Extubate    [ ] T-Piece  [ ]peep>5     Rapid weaning / extubation candidate    Temporary pacemaker (TPM) interrogation and setting.     Bradycardia    Chest Tube Drainage / Management     Obesity ? OHS / GOGO ?    May require extubation to BIPAP    IVCD Insulin     Requires bedside physical therapy, mobilization and total half-way care.                         Discussed with CT surgeon, Physician's Assistant - Nurse Practitioner- Critical care medicine team.   Discussed at  AM / PM rounds.   Chart, labs , films reviewed.    Cumulative Critical Care Time Given Today : 35 min

## 2025-01-11 ENCOUNTER — RESULT REVIEW (OUTPATIENT)
Age: 75
End: 2025-01-11

## 2025-01-11 LAB
ALBUMIN SERPL ELPH-MCNC: 4 G/DL — SIGNIFICANT CHANGE UP (ref 3.3–5.2)
ALP SERPL-CCNC: 34 U/L — LOW (ref 40–120)
ALT FLD-CCNC: 34 U/L — SIGNIFICANT CHANGE UP
ANION GAP SERPL CALC-SCNC: 12 MMOL/L — SIGNIFICANT CHANGE UP (ref 5–17)
ANION GAP SERPL CALC-SCNC: 14 MMOL/L — SIGNIFICANT CHANGE UP (ref 5–17)
ANION GAP SERPL CALC-SCNC: 19 MMOL/L — HIGH (ref 5–17)
APTT BLD: 31.4 SEC — SIGNIFICANT CHANGE UP (ref 24.5–35.6)
AST SERPL-CCNC: 68 U/L — HIGH
BASE EXCESS BLDA CALC-SCNC: -3.7 MMOL/L — LOW (ref -2–3)
BASE EXCESS BLDA CALC-SCNC: 2.1 MMOL/L — SIGNIFICANT CHANGE UP (ref -2–3)
BILIRUB SERPL-MCNC: 0.7 MG/DL — SIGNIFICANT CHANGE UP (ref 0.4–2)
BLOOD GAS COMMENTS ARTERIAL: SIGNIFICANT CHANGE UP
BUN SERPL-MCNC: 29.3 MG/DL — HIGH (ref 8–20)
BUN SERPL-MCNC: 29.3 MG/DL — HIGH (ref 8–20)
BUN SERPL-MCNC: 29.7 MG/DL — HIGH (ref 8–20)
CALCIUM SERPL-MCNC: 8.5 MG/DL — SIGNIFICANT CHANGE UP (ref 8.4–10.5)
CALCIUM SERPL-MCNC: 8.7 MG/DL — SIGNIFICANT CHANGE UP (ref 8.4–10.5)
CALCIUM SERPL-MCNC: 8.8 MG/DL — SIGNIFICANT CHANGE UP (ref 8.4–10.5)
CHLORIDE SERPL-SCNC: 101 MMOL/L — SIGNIFICANT CHANGE UP (ref 96–108)
CHLORIDE SERPL-SCNC: 102 MMOL/L — SIGNIFICANT CHANGE UP (ref 96–108)
CHLORIDE SERPL-SCNC: 105 MMOL/L — SIGNIFICANT CHANGE UP (ref 96–108)
CK MB CFR SERPL CALC: 45.1 NG/ML — HIGH (ref 0–6.7)
CK MB CFR SERPL CALC: 45.1 NG/ML — HIGH (ref 0–6.7)
CK SERPL-CCNC: 1094 U/L — HIGH (ref 30–200)
CK SERPL-CCNC: 691 U/L — HIGH (ref 30–200)
CO2 SERPL-SCNC: 20 MMOL/L — LOW (ref 22–29)
CO2 SERPL-SCNC: 23 MMOL/L — SIGNIFICANT CHANGE UP (ref 22–29)
CO2 SERPL-SCNC: 24 MMOL/L — SIGNIFICANT CHANGE UP (ref 22–29)
CREAT SERPL-MCNC: 1.04 MG/DL — SIGNIFICANT CHANGE UP (ref 0.5–1.3)
CREAT SERPL-MCNC: 1.09 MG/DL — SIGNIFICANT CHANGE UP (ref 0.5–1.3)
CREAT SERPL-MCNC: 1.43 MG/DL — HIGH (ref 0.5–1.3)
EGFR: 51 ML/MIN/1.73M2 — LOW
EGFR: 70 ML/MIN/1.73M2 — SIGNIFICANT CHANGE UP
EGFR: 74 ML/MIN/1.73M2 — SIGNIFICANT CHANGE UP
GAS PNL BLDA: SIGNIFICANT CHANGE UP
GLUCOSE BLDC GLUCOMTR-MCNC: 119 MG/DL — HIGH (ref 70–99)
GLUCOSE BLDC GLUCOMTR-MCNC: 129 MG/DL — HIGH (ref 70–99)
GLUCOSE BLDC GLUCOMTR-MCNC: 138 MG/DL — HIGH (ref 70–99)
GLUCOSE BLDC GLUCOMTR-MCNC: 148 MG/DL — HIGH (ref 70–99)
GLUCOSE BLDC GLUCOMTR-MCNC: 170 MG/DL — HIGH (ref 70–99)
GLUCOSE BLDC GLUCOMTR-MCNC: 178 MG/DL — HIGH (ref 70–99)
GLUCOSE BLDC GLUCOMTR-MCNC: 180 MG/DL — HIGH (ref 70–99)
GLUCOSE BLDC GLUCOMTR-MCNC: 184 MG/DL — HIGH (ref 70–99)
GLUCOSE BLDC GLUCOMTR-MCNC: 202 MG/DL — HIGH (ref 70–99)
GLUCOSE BLDC GLUCOMTR-MCNC: 203 MG/DL — HIGH (ref 70–99)
GLUCOSE BLDC GLUCOMTR-MCNC: 211 MG/DL — HIGH (ref 70–99)
GLUCOSE BLDC GLUCOMTR-MCNC: 216 MG/DL — HIGH (ref 70–99)
GLUCOSE BLDC GLUCOMTR-MCNC: 228 MG/DL — HIGH (ref 70–99)
GLUCOSE SERPL-MCNC: 118 MG/DL — HIGH (ref 70–99)
GLUCOSE SERPL-MCNC: 153 MG/DL — HIGH (ref 70–99)
GLUCOSE SERPL-MCNC: 212 MG/DL — HIGH (ref 70–99)
HCO3 BLDA-SCNC: 22 MMOL/L — SIGNIFICANT CHANGE UP (ref 21–28)
HCO3 BLDA-SCNC: 27 MMOL/L — SIGNIFICANT CHANGE UP (ref 21–28)
HCT VFR BLD CALC: 30.7 % — LOW (ref 39–50)
HGB BLD-MCNC: 10.3 G/DL — LOW (ref 13–17)
HOROWITZ INDEX BLDA+IHG-RTO: 32 — SIGNIFICANT CHANGE UP
INR BLD: 1.15 RATIO — SIGNIFICANT CHANGE UP (ref 0.85–1.16)
INR BLD: 1.16 RATIO — SIGNIFICANT CHANGE UP (ref 0.85–1.16)
LACTATE SERPL-SCNC: 1.1 MMOL/L — SIGNIFICANT CHANGE UP (ref 0.5–2)
LACTATE SERPL-SCNC: 4.8 MMOL/L — CRITICAL HIGH (ref 0.5–2)
LDH SERPL L TO P-CCNC: 322 U/L — HIGH (ref 98–192)
MAGNESIUM SERPL-MCNC: 2.9 MG/DL — HIGH (ref 1.6–2.6)
MCHC RBC-ENTMCNC: 29.4 PG — SIGNIFICANT CHANGE UP (ref 27–34)
MCHC RBC-ENTMCNC: 33.6 G/DL — SIGNIFICANT CHANGE UP (ref 32–36)
MCV RBC AUTO: 87.7 FL — SIGNIFICANT CHANGE UP (ref 80–100)
PCO2 BLDA: 41 MMHG — SIGNIFICANT CHANGE UP (ref 35–48)
PCO2 BLDA: 41 MMHG — SIGNIFICANT CHANGE UP (ref 35–48)
PH BLDA: 7.34 — LOW (ref 7.35–7.45)
PH BLDA: 7.42 — SIGNIFICANT CHANGE UP (ref 7.35–7.45)
PLATELET # BLD AUTO: 142 K/UL — LOW (ref 150–400)
PO2 BLDA: 100 MMHG — SIGNIFICANT CHANGE UP (ref 83–108)
PO2 BLDA: 72 MMHG — LOW (ref 83–108)
POTASSIUM SERPL-MCNC: 4.1 MMOL/L — SIGNIFICANT CHANGE UP (ref 3.5–5.3)
POTASSIUM SERPL-MCNC: 4.1 MMOL/L — SIGNIFICANT CHANGE UP (ref 3.5–5.3)
POTASSIUM SERPL-MCNC: 4.2 MMOL/L — SIGNIFICANT CHANGE UP (ref 3.5–5.3)
POTASSIUM SERPL-SCNC: 4.1 MMOL/L — SIGNIFICANT CHANGE UP (ref 3.5–5.3)
POTASSIUM SERPL-SCNC: 4.1 MMOL/L — SIGNIFICANT CHANGE UP (ref 3.5–5.3)
POTASSIUM SERPL-SCNC: 4.2 MMOL/L — SIGNIFICANT CHANGE UP (ref 3.5–5.3)
PROT SERPL-MCNC: 5.9 G/DL — LOW (ref 6.6–8.7)
PROTHROM AB SERPL-ACNC: 13.3 SEC — SIGNIFICANT CHANGE UP (ref 9.9–13.4)
PROTHROM AB SERPL-ACNC: 13.4 SEC — SIGNIFICANT CHANGE UP (ref 9.9–13.4)
RBC # BLD: 3.5 M/UL — LOW (ref 4.2–5.8)
RBC # FLD: 13 % — SIGNIFICANT CHANGE UP (ref 10.3–14.5)
SAO2 % BLDA: 97.7 % — SIGNIFICANT CHANGE UP (ref 94–98)
SAO2 % BLDA: 99 % — HIGH (ref 94–98)
SODIUM SERPL-SCNC: 138 MMOL/L — SIGNIFICANT CHANGE UP (ref 135–145)
SODIUM SERPL-SCNC: 138 MMOL/L — SIGNIFICANT CHANGE UP (ref 135–145)
SODIUM SERPL-SCNC: 144 MMOL/L — SIGNIFICANT CHANGE UP (ref 135–145)
TROPONIN T, HIGH SENSITIVITY RESULT: 1069 NG/L — HIGH (ref 0–51)
TROPONIN T, HIGH SENSITIVITY RESULT: 1441 NG/L — HIGH (ref 0–51)
WBC # BLD: 13.27 K/UL — HIGH (ref 3.8–10.5)
WBC # FLD AUTO: 13.27 K/UL — HIGH (ref 3.8–10.5)

## 2025-01-11 PROCEDURE — 99291 CRITICAL CARE FIRST HOUR: CPT

## 2025-01-11 PROCEDURE — 99292 CRITICAL CARE ADDL 30 MIN: CPT

## 2025-01-11 PROCEDURE — 93306 TTE W/DOPPLER COMPLETE: CPT | Mod: 26

## 2025-01-11 PROCEDURE — 93010 ELECTROCARDIOGRAM REPORT: CPT | Mod: 76

## 2025-01-11 PROCEDURE — 71045 X-RAY EXAM CHEST 1 VIEW: CPT | Mod: 26

## 2025-01-11 RX ORDER — EZETIMIBE 10 MG/1
10 TABLET ORAL DAILY
Refills: 0 | Status: DISCONTINUED | OUTPATIENT
Start: 2025-01-11 | End: 2025-01-15

## 2025-01-11 RX ORDER — CLOPIDOGREL BISULFATE 75 MG/1
75 TABLET, FILM COATED ORAL DAILY
Refills: 0 | Status: DISCONTINUED | OUTPATIENT
Start: 2025-01-11 | End: 2025-01-15

## 2025-01-11 RX ORDER — METOPROLOL TARTRATE 50 MG
12.5 TABLET ORAL
Refills: 0 | Status: DISCONTINUED | OUTPATIENT
Start: 2025-01-11 | End: 2025-01-15

## 2025-01-11 RX ORDER — INSULIN LISPRO 100/ML
VIAL (ML) SUBCUTANEOUS AT BEDTIME
Refills: 0 | Status: DISCONTINUED | OUTPATIENT
Start: 2025-01-11 | End: 2025-01-15

## 2025-01-11 RX ORDER — SODIUM CHLORIDE 9 MG/ML
1000 INJECTION, SOLUTION INTRAVENOUS
Refills: 0 | Status: DISCONTINUED | OUTPATIENT
Start: 2025-01-11 | End: 2025-01-15

## 2025-01-11 RX ORDER — INSULIN LISPRO 100/ML
4 VIAL (ML) SUBCUTANEOUS
Refills: 0 | Status: DISCONTINUED | OUTPATIENT
Start: 2025-01-11 | End: 2025-01-11

## 2025-01-11 RX ORDER — ATORVASTATIN CALCIUM 40 MG/1
80 TABLET, FILM COATED ORAL AT BEDTIME
Refills: 0 | Status: DISCONTINUED | OUTPATIENT
Start: 2025-01-11 | End: 2025-01-15

## 2025-01-11 RX ORDER — INSULIN GLARGINE-YFGN 100 [IU]/ML
12 INJECTION, SOLUTION SUBCUTANEOUS AT BEDTIME
Refills: 0 | Status: DISCONTINUED | OUTPATIENT
Start: 2025-01-11 | End: 2025-01-15

## 2025-01-11 RX ORDER — ENOXAPARIN SODIUM 60 MG/.6ML
40 INJECTION INTRAVENOUS; SUBCUTANEOUS EVERY 24 HOURS
Refills: 0 | Status: DISCONTINUED | OUTPATIENT
Start: 2025-01-11 | End: 2025-01-15

## 2025-01-11 RX ORDER — GLUCAGON INJECTION, SOLUTION 0.5 MG/.1ML
1 INJECTION, SOLUTION SUBCUTANEOUS ONCE
Refills: 0 | Status: DISCONTINUED | OUTPATIENT
Start: 2025-01-11 | End: 2025-01-15

## 2025-01-11 RX ORDER — DEXTROSE MONOHYDRATE 25 G/50ML
15 INJECTION, SOLUTION INTRAVENOUS ONCE
Refills: 0 | Status: DISCONTINUED | OUTPATIENT
Start: 2025-01-11 | End: 2025-01-15

## 2025-01-11 RX ORDER — KETOROLAC TROMETHAMINE 30 MG/ML
15 INJECTION INTRAMUSCULAR; INTRAVENOUS EVERY 8 HOURS
Refills: 0 | Status: DISCONTINUED | OUTPATIENT
Start: 2025-01-11 | End: 2025-01-11

## 2025-01-11 RX ORDER — INSULIN LISPRO 100/ML
VIAL (ML) SUBCUTANEOUS
Refills: 0 | Status: DISCONTINUED | OUTPATIENT
Start: 2025-01-11 | End: 2025-01-15

## 2025-01-11 RX ORDER — INSULIN LISPRO 100/ML
3 VIAL (ML) SUBCUTANEOUS
Refills: 0 | Status: DISCONTINUED | OUTPATIENT
Start: 2025-01-11 | End: 2025-01-12

## 2025-01-11 RX ORDER — NICARDIPINE HYDROCHLORIDE 2.5 MG/ML
5 INJECTION INTRAVENOUS
Qty: 40 | Refills: 0 | Status: DISCONTINUED | OUTPATIENT
Start: 2025-01-11 | End: 2025-01-12

## 2025-01-11 RX ADMIN — SENNOSIDES 2 TABLET(S): 8.6 TABLET, FILM COATED ORAL at 22:26

## 2025-01-11 RX ADMIN — Medication 12.5 MILLIGRAM(S): at 13:01

## 2025-01-11 RX ADMIN — Medication 325 MILLIGRAM(S): at 13:00

## 2025-01-11 RX ADMIN — Medication 10 MILLIGRAM(S): at 22:26

## 2025-01-11 RX ADMIN — Medication 10 MILLIGRAM(S): at 23:26

## 2025-01-11 RX ADMIN — Medication 125 MILLILITER(S): at 06:58

## 2025-01-11 RX ADMIN — GABAPENTIN 100 MILLIGRAM(S): 300 CAPSULE ORAL at 22:25

## 2025-01-11 RX ADMIN — Medication 500 MILLIGRAM(S): at 05:49

## 2025-01-11 RX ADMIN — Medication 100 MILLIGRAM(S): at 14:38

## 2025-01-11 RX ADMIN — EZETIMIBE 10 MILLIGRAM(S): 10 TABLET ORAL at 22:25

## 2025-01-11 RX ADMIN — Medication 4 UNIT(S): at 16:47

## 2025-01-11 RX ADMIN — CLOPIDOGREL BISULFATE 75 MILLIGRAM(S): 75 TABLET, FILM COATED ORAL at 13:02

## 2025-01-11 RX ADMIN — Medication 100 MILLIGRAM(S): at 07:00

## 2025-01-11 RX ADMIN — Medication 12.5 MILLIGRAM(S): at 17:39

## 2025-01-11 RX ADMIN — PANTOPRAZOLE 40 MILLIGRAM(S): 40 TABLET, DELAYED RELEASE ORAL at 13:01

## 2025-01-11 RX ADMIN — GABAPENTIN 100 MILLIGRAM(S): 300 CAPSULE ORAL at 16:33

## 2025-01-11 RX ADMIN — KETOROLAC TROMETHAMINE 15 MILLIGRAM(S): 30 INJECTION INTRAMUSCULAR; INTRAVENOUS at 02:50

## 2025-01-11 RX ADMIN — ACETAMINOPHEN 400 MILLIGRAM(S): 80 SOLUTION/ DROPS ORAL at 13:01

## 2025-01-11 RX ADMIN — ACETAMINOPHEN 400 MILLIGRAM(S): 80 SOLUTION/ DROPS ORAL at 17:39

## 2025-01-11 RX ADMIN — NICARDIPINE HYDROCHLORIDE 25 MG/HR: 2.5 INJECTION INTRAVENOUS at 18:09

## 2025-01-11 RX ADMIN — Medication 325 MILLIGRAM(S): at 01:27

## 2025-01-11 RX ADMIN — GABAPENTIN 100 MILLIGRAM(S): 300 CAPSULE ORAL at 05:49

## 2025-01-11 RX ADMIN — INSULIN GLARGINE-YFGN 12 UNIT(S): 100 INJECTION, SOLUTION SUBCUTANEOUS at 23:58

## 2025-01-11 RX ADMIN — ENOXAPARIN SODIUM 40 MILLIGRAM(S): 60 INJECTION INTRAVENOUS; SUBCUTANEOUS at 13:01

## 2025-01-11 RX ADMIN — ACETAMINOPHEN 1000 MILLIGRAM(S): 80 SOLUTION/ DROPS ORAL at 19:04

## 2025-01-11 RX ADMIN — CHLORHEXIDINE GLUCONATE 15 MILLILITER(S): 1.2 RINSE ORAL at 05:50

## 2025-01-11 RX ADMIN — CHLORHEXIDINE GLUCONATE 15 MILLILITER(S): 1.2 RINSE ORAL at 16:37

## 2025-01-11 RX ADMIN — ACETAMINOPHEN 1000 MILLIGRAM(S): 80 SOLUTION/ DROPS ORAL at 14:00

## 2025-01-11 RX ADMIN — ATORVASTATIN CALCIUM 80 MILLIGRAM(S): 40 TABLET, FILM COATED ORAL at 22:24

## 2025-01-11 RX ADMIN — ACETAMINOPHEN 400 MILLIGRAM(S): 80 SOLUTION/ DROPS ORAL at 05:50

## 2025-01-11 RX ADMIN — Medication 0.5 MILLIGRAM(S): at 06:51

## 2025-01-11 RX ADMIN — CHLORHEXIDINE GLUCONATE 1 APPLICATION(S): 1.2 RINSE ORAL at 13:28

## 2025-01-11 RX ADMIN — Medication 17 GRAM(S): at 13:01

## 2025-01-11 RX ADMIN — Medication 500 MILLIGRAM(S): at 16:36

## 2025-01-11 RX ADMIN — Medication 250 MILLILITER(S): at 03:07

## 2025-01-11 RX ADMIN — VANCOMYCIN HYDROCHLORIDE 250 MILLIGRAM(S): 5 INJECTION, POWDER, LYOPHILIZED, FOR SOLUTION INTRAVENOUS at 02:48

## 2025-01-11 RX ADMIN — Medication 0.5 MILLIGRAM(S): at 00:50

## 2025-01-11 RX ADMIN — Medication 0.5 MILLIGRAM(S): at 05:51

## 2025-01-11 RX ADMIN — Medication 4 UNIT(S): at 13:00

## 2025-01-11 RX ADMIN — INSULIN HUMAN 2 UNIT(S)/HR: 100 INJECTION, SOLUTION SUBCUTANEOUS at 18:09

## 2025-01-11 RX ADMIN — VANCOMYCIN HYDROCHLORIDE 250 MILLIGRAM(S): 5 INJECTION, POWDER, LYOPHILIZED, FOR SOLUTION INTRAVENOUS at 14:38

## 2025-01-11 RX ADMIN — KETOROLAC TROMETHAMINE 15 MILLIGRAM(S): 30 INJECTION INTRAMUSCULAR; INTRAVENOUS at 03:46

## 2025-01-11 RX ADMIN — CHLORHEXIDINE GLUCONATE 1 APPLICATION(S): 1.2 RINSE ORAL at 05:51

## 2025-01-11 NOTE — PHYSICAL THERAPY INITIAL EVALUATION ADULT - GENERAL OBSERVATIONS, REHAB EVAL
Pt received lying in bed on [address], [place] for OOB to chair per PA with femoral arterial line, RN present t/o session. (+) huynh, (+) tele//BP, (+) chest tubes x 3 to LWS, (+) TLC right IJ, (+) external pacer, (+) supplemental O2 via NC, NAD. Agreeable to PT evaluation.

## 2025-01-11 NOTE — PROGRESS NOTE ADULT - SUBJECTIVE AND OBJECTIVE BOX
ROYAL AYSHA  MRN-[id number]    HPI:  75y/o male with PMHx HTN, asymptomatic bradycardia, h/o frequent PVCs presented to [facility] for elective LHC. Pt reports having outpatient CT calcium scan which was elevated. Pt was referred to [facility] for LHC today, revealing TVD - pLAD 90%, oCx 75%, and %. EF normal at 60%. Pt transferred to [facility] for CABG work up.     Pt seen and examined at bedside. In no acute distress, resting comfortably in hospital bed. Pt reports being completely asymptomatic prior to CT calcium score. Denies any current fever, chills, dizziness, lightheadedness, HA, chest pain, palpitations, SOB, abdominal pain, N/V/D, LE edema, urinary symptoms. Pt does endorse RLE tenderness on his inner calf for the past 1.5 months. States he had a RLE US 2 weeks ago which was negative for a dvt.  (07 Jan 2025 19:04)    ICU Vital Signs Last 24 Hrs  T(C): 37 (11 Jan 2025 07:00), Max: 37 (11 Jan 2025 07:00)  T(F): 98.6 (11 Jan 2025 07:00), Max: 98.6 (11 Jan 2025 07:00)  HR: 63 (11 Jan 2025 07:00) (49 - 66)  BP: 145/68 (11 Jan 2025 06:00) (104/58 - 172/91)  BP(mean): 89 (11 Jan 2025 06:00) (71 - 90)  ABP: 137/57 (11 Jan 2025 07:00) (93/49 - 178/52)  ABP(mean): 83 (11 Jan 2025 07:00) (63 - 90)  RR: 16 (11 Jan 2025 07:00) (0 - 21)  SpO2: 96% (11 Jan 2025 07:00) (90% - 100%)    O2 Parameters below as of 11 Jan 2025 07:00  Patient On (Oxygen Delivery Method): nasal cannula  O2 Flow (L/min): 3          Physical Exam:  Gen: A&O   CNS: non focal 	  Neck: no JVD  RES : clear , no wheezing              CVS: Regular  rhythm. Normal S1/S2  Abd: Soft, non-distended. Bowel sounds present.  Skin: No rash.  Ext:  no edema    ============================I/O===========================   I&O's Detail    10 Sae 2025 07:01  -  11 Jan 2025 07:00  --------------------------------------------------------  IN:    Albumin 5%  - 500 mL: 1500 mL    Insulin: 84.5 mL    IV PiggyBack: 100 mL    IV PiggyBack: 50 mL    IV PiggyBack: 250 mL    IV PiggyBack: 250 mL    Norepinephrine: 33.7 mL    Oral Fluid: 450 mL    sodium chloride 0.9%: 120 mL    sodium chloride 0.9%: 60 mL  Total IN: 2898.2 mL    OUT:    Chest Tube (mL): 120 mL    Chest Tube (mL): 340 mL    Chest Tube (mL): 200 mL    Indwelling Catheter - Urethral (mL): 1325 mL    NiCARdipine: 0 mL    Propofol: 0 mL  Total OUT: 1985 mL    Total NET: 913.2 mL        ============================ LABS =========================                        10.3   13.27 )-----------( 142      ( 11 Jan 2025 02:00 )             30.7     01-11    144  |  105  |  29.3[H]  ----------------------------<  212[H]  4.1   |  20.0[L]  |  1.43[H]    Ca    8.8      11 Jan 2025 02:00  Mg     2.9     01-11    TPro  5.4[L]  /  Alb  3.2[L]  /  TBili  0.9  /  DBili  x   /  AST  63[H]  /  ALT  32  /  AlkPhos  46  01-10    LIVER FUNCTIONS - ( 10 Sae 2025 20:15 )  Alb: 3.2 g/dL / Pro: 5.4 g/dL / ALK PHOS: 46 U/L / ALT: 32 U/L / AST: 63 U/L / GGT: x           PT/INR - ( 11 Jan 2025 02:00 )   PT: 13.3 sec;   INR: 1.15 ratio         PTT - ( 11 Jan 2025 02:00 )  PTT:31.4 sec  ABG - ( 11 Jan 2025 02:40 )  pH, Arterial: 7.360 pH, Blood: x     /  pCO2: 39    /  pO2: 82    / HCO3: 22    / Base Excess: -3.4  /  SaO2: 98.2              Urinalysis Basic - ( 11 Jan 2025 02:00 )    Color: x / Appearance: x / SG: x / pH: x  Gluc: 212 mg/dL / Ketone: x  / Bili: x / Urobili: x   Blood: x / Protein: x / Nitrite: x   Leuk Esterase: x / RBC: x / WBC x   Sq Epi: x / Non Sq Epi: x / Bacteria: x      ======================Micro/Rad/Cardio=================  Culture: Reviewed   CXR: Reviewed  Echo:Reviewed  ======================================================  PAST MEDICAL & SURGICAL HISTORY:  Hypertension      Bigeminy      Exostosis  left ear canal      BPH (benign prostatic hypertrophy)      Hard of hearing  Exostosis left ear  in May 2015      Lumbar disc disease      Hemorrhoid  2012  Hemorrhoidectomy      Lumbar disc disease  lumbar disc surgery 15 years ago      S/P cholecystectomy        ====================ASSESSMENT ==============  Hypertension  Bigeminy  CAD  S/p CABG, with KANNAN 10-Sae-2025         CABG X 3 LIMA -LAD , V-OM , V-PDA  BPH (benign prostatic hypertrophy)  Post op Hypovolemia  Post op respiratory insufficiency       Plan:  Invasive hemodynamic monitoring required for the following of MAP/SBP monitoring to ensure adequate CV support and to prevent decompensation    Beta blocker as tolerated by HR and SBP when able  Lipitor for chronic graft patency prophylaxis  Aspirin for acute graft closure prophylaxis  Amiodarone per ERP-400mg BID X 3days  Vitamin C per ERP for AF ppx  Respiratoty Status required the following of continuous pulse oximetry for support and to prevent decompensation   Chest PT and IS use with bedside nurse  Analgesic regimen to optimize function with Tylenol and Narcotics   Continue Diuresis  Continue daily Ferrous Sulfate and Folic acid  Continue GI ppx with Protonix and Senna  DVT ppx with Lovenox and SCD boots  Strict glucose control and management for SWI ppx , required adjustment of Insulin while following serial Glucose levels ad maintain euglycemia     ====================== NEUROLOGY=====================  acetaminophen     Tablet .. 975 milliGRAM(s) Oral every 6 hours  acetaminophen   IVPB .. 1000 milliGRAM(s) IV Intermittent every 6 hours  gabapentin 100 milliGRAM(s) Oral every 8 hours  HYDROmorphone  Injectable 0.5 milliGRAM(s) IV Push every 6 hours PRN Breakthrough Pain  oxyCODONE    IR 5 milliGRAM(s) Oral every 4 hours PRN Moderate Pain (4 - 6)  oxyCODONE    IR 10 milliGRAM(s) Oral every 4 hours PRN Severe Pain (7 - 10)    ==================== RESPIRATORY======================  Post op respiratory insufficiency  Mechanical Ventilation:  Mode: CPAP with PS  FiO2: 40  PEEP: 5  PS: 5  MAP: 8      ====================CARDIOVASCULAR==================  Post op Hypovolemia  norepinephrine Infusion 0.05 MICROgram(s)/kG/Min (9.36 mL/Hr) IV Continuous <Continuous>    ===================HEMATOLOGIC/ONC ===================  Monitor H&H/Plts    aspirin enteric coated 325 milliGRAM(s) Oral daily  enoxaparin Injectable 40 milliGRAM(s) SubCutaneous every 24 hours    ===================== RENAL =========================  Continue monitoring urine output, I&OS, BUN/Cr     ==================== GASTROINTESTINAL===================  ascorbic acid 500 milliGRAM(s) Oral two times a day  dextrose 5%. 1000 milliLiter(s) (50 mL/Hr) IV Continuous <Continuous>  dextrose 5%. 1000 milliLiter(s) (100 mL/Hr) IV Continuous <Continuous>  pantoprazole    Tablet 40 milliGRAM(s) Oral daily  polyethylene glycol 3350 17 Gram(s) Oral daily  senna 2 Tablet(s) Oral at bedtime  sodium chloride 0.9% lock flush 10 milliLiter(s) IV Push every 1 hour PRN Pre/post blood products, medications, blood draw, and to maintain line patency  sodium chloride 0.9%. 1000 milliLiter(s) (10 mL/Hr) IV Continuous <Continuous>  sodium chloride 0.9%. 1000 milliLiter(s) (5 mL/Hr) IV Continuous <Continuous>    =======================    ENDOCRINE  =====================  dextrose 50% Injectable 50 milliLiter(s) IV Push every 15 minutes  dextrose 50% Injectable 25 milliLiter(s) IV Push every 15 minutes  dextrose Oral Gel 15 Gram(s) Oral once PRN Blood Glucose LESS THAN 70 milliGRAM(s)/deciliter  glucagon  Injectable 1 milliGRAM(s) IntraMuscular once  insulin lispro Injectable (ADMELOG) 4 Unit(s) SubCutaneous three times a day before meals  insulin regular Infusion 2 Unit(s)/Hr (2 mL/Hr) IV Continuous <Continuous>    ========================INFECTIOUS DISEASE================  cefuroxime  IVPB 1500 milliGRAM(s) IV Intermittent every 8 hours  vancomycin  IVPB 1000 milliGRAM(s) IV Intermittent every 12 hours      -Close hemodynamic , ventilatory and drain monitoring and management per post op routine .  -Monitor Neurologic status ,   -Head of the bed should remain elevated to 45 degrees,  -Monitor adequacy of oxygenation and ventilation and attempt to wean oxygen ,  -Monitor for arrhythmias and monitor parameters for organ perfusion,  -Glycemic control is satisfactory,  -Nutritional goals will be met using po eventually , insure adequate caloric intake and monitor the same ,  -Electrolytes have been repleted as necessary , pain control has been achieved  and wound care has been carried out ,  -Stress ulcer and VTE prophylaxis will be achieved,  -Agressive PT and early mobility and ambulation goals will be met,  -The family was updated about the course and plan .      I have spent 135 minutes providing acute care for this critically ill patient     Patient requires continuous monitoring with bedside rhythm monitoring, pulse ox monitoring, and intermittent blood gas analysis. Care plan discussed with ICU care team. Patient remained critical and at risk for life threatening decompensation.            ROYAL AYSHA  MRN-[id number]    HPI:  73y/o male with PMHx HTN, asymptomatic bradycardia, h/o frequent PVCs presented to [facility] [place] [facility] for elective LHC. Pt reports having outpatient CT calcium scan which was elevated. Pt was referred to [facility] for LHC today, revealing TVD - pLAD 90%, oCx 75%, and %. EF normal at 60%. Pt transferred to [facility] for CABG work up.     Pt seen and examined at bedside. In no acute distress, resting comfortably in hospital bed. Pt reports being completely asymptomatic prior to CT calcium score. Denies any current fever, chills, dizziness, lightheadedness, HA, chest pain, palpitations, SOB, abdominal pain, N/V/D, LE edema, urinary symptoms. Pt does endorse RLE tenderness on his inner calf for the past 1.5 months. States he had a RLE US 2 weeks ago which was negative for a dvt.  (07 Jan 2025 19:04)    TTE W or WO Ultrasound Enhancing Agent (01.11.25 )   1. Septal motion is abnormal consistent with previous cardiac surgery. Left ventricular systolic function is normal with an ejection fraction of 64 % by Mccarthy's method of disks.   2. Normal right ventricular cavity size and normal wallthickness,.   3. Left atrium is normal in size.   4. The right atrium is normal in size.   5. Mild mitral valve leaflet calcification.   6. Small linear mobile echodensity visualized at tips of aortic leaflets. Appearance suggestive of Lambl's Excresences. Clinical correlation recommended.   7. Tricuspid aortic valve with normal leaflet excursion with normal systolic excursion.   8. Compared to the transthoracic echocardiogram performed on 1/7/2025, there have been no significant interval changes.      ICU Vital Signs Last 24 Hrs  T(C): 37 (11 Jan 2025 07:00), Max: 37 (11 Jan 2025 07:00)  T(F): 98.6 (11 Jan 2025 07:00), Max: 98.6 (11 Jan 2025 07:00)  HR: 63 (11 Jan 2025 07:00) (49 - 66)  BP: 145/68 (11 Jan 2025 06:00) (104/58 - 172/91)  BP(mean): 89 (11 Jan 2025 06:00) (71 - 90)  ABP: 137/57 (11 Jan 2025 07:00) (93/49 - 178/52)  ABP(mean): 83 (11 Jan 2025 07:00) (63 - 90)  RR: 16 (11 Jan 2025 07:00) (0 - 21)  SpO2: 96% (11 Jan 2025 07:00) (90% - 100%)    O2 Parameters below as of 11 Jan 2025 07:00  Patient On (Oxygen Delivery Method): nasal cannula  O2 Flow (L/min): 3          Physical Exam:  Gen: A&O   CNS: non focal 	  Neck: no JVD  RES : clear , no wheezing              CVS: Regular  rhythm. Normal S1/S2  Abd: Soft, non-distended. Bowel sounds present.  Skin: No rash.  Ext:  no edema    ============================I/O===========================   I&O's Detail    10 Sae 2025 07:01  -  11 Jan 2025 07:00  --------------------------------------------------------  IN:    Albumin 5%  - 500 mL: 1500 mL    Insulin: 84.5 mL    IV PiggyBack: 100 mL    IV PiggyBack: 50 mL    IV PiggyBack: 250 mL    IV PiggyBack: 250 mL    Norepinephrine: 33.7 mL    Oral Fluid: 450 mL    sodium chloride 0.9%: 120 mL    sodium chloride 0.9%: 60 mL  Total IN: 2898.2 mL    OUT:    Chest Tube (mL): 120 mL    Chest Tube (mL): 340 mL    Chest Tube (mL): 200 mL    Indwelling Catheter - Urethral (mL): 1325 mL    NiCARdipine: 0 mL    Propofol: 0 mL  Total OUT: 1985 mL    Total NET: 913.2 mL        ============================ LABS =========================                        10.3   13.27 )-----------( 142      ( 11 Jan 2025 02:00 )             30.7     01-11    144  |  105  |  29.3[H]  ----------------------------<  212[H]  4.1   |  20.0[L]  |  1.43[H]    Ca    8.8      11 Jan 2025 02:00  Mg     2.9     01-11    TPro  5.4[L]  /  Alb  3.2[L]  /  TBili  0.9  /  DBili  x   /  AST  63[H]  /  ALT  32  /  AlkPhos  46  01-10    LIVER FUNCTIONS - ( 10 Sae 2025 20:15 )  Alb: 3.2 g/dL / Pro: 5.4 g/dL / ALK PHOS: 46 U/L / ALT: 32 U/L / AST: 63 U/L / GGT: x           PT/INR - ( 11 Jan 2025 02:00 )   PT: 13.3 sec;   INR: 1.15 ratio         PTT - ( 11 Jan 2025 02:00 )  PTT:31.4 sec  ABG - ( 11 Jan 2025 02:40 )  pH, Arterial: 7.360 pH, Blood: x     /  pCO2: 39    /  pO2: 82    / HCO3: 22    / Base Excess: -3.4  /  SaO2: 98.2              Urinalysis Basic - ( 11 Jan 2025 02:00 )    Color: x / Appearance: x / SG: x / pH: x  Gluc: 212 mg/dL / Ketone: x  / Bili: x / Urobili: x   Blood: x / Protein: x / Nitrite: x   Leuk Esterase: x / RBC: x / WBC x   Sq Epi: x / Non Sq Epi: x / Bacteria: x      ======================Micro/Rad/Cardio=================  Culture: Reviewed   CXR: Reviewed  Echo:Reviewed  ======================================================  PAST MEDICAL & SURGICAL HISTORY:  Hypertension      Bigeminy      Exostosis  left ear canal      BPH (benign prostatic hypertrophy)      Hard of hearing  Exostosis left ear  in May 2015      Lumbar disc disease      Hemorrhoid  2012  Hemorrhoidectomy      Lumbar disc disease  lumbar disc surgery 15 years ago      S/P cholecystectomy        ====================ASSESSMENT ==============  Hypertension  Bigeminy  CAD  S/p CABG, with KANNAN 10-Sae-2025         CABG X 3 LIMA -LAD , V-OM , V-PDA  BPH (benign prostatic hypertrophy)  Post op Hypovolemia  Post op respiratory insufficiency       Plan:  TTE today  Invasive hemodynamic monitoring required for the following of MAP/SBP monitoring to ensure adequate CV support and to prevent decompensation    Beta blocker as tolerated by HR and SBP when able  Lipitor for chronic graft patency prophylaxis  Aspirin for acute graft closure prophylaxis  Amiodarone per ERP-400mg BID X 3days  Vitamin C per ERP for AF ppx  Respiratoty Status required the following of continuous pulse oximetry for support and to prevent decompensation   Chest PT and IS use with bedside nurse  Analgesic regimen to optimize function with Tylenol and Narcotics   Continue daily Ferrous Sulfate and Folic acid  Continue GI ppx with Protonix and Senna  DVT ppx with Lovenox and SCD boots  Strict glucose control and management for SWI ppx , required adjustment of Insulin while following serial Glucose levels ad maintain euglycemia     ====================== NEUROLOGY=====================  acetaminophen     Tablet .. 975 milliGRAM(s) Oral every 6 hours  acetaminophen   IVPB .. 1000 milliGRAM(s) IV Intermittent every 6 hours  gabapentin 100 milliGRAM(s) Oral every 8 hours  HYDROmorphone  Injectable 0.5 milliGRAM(s) IV Push every 6 hours PRN Breakthrough Pain  oxyCODONE    IR 5 milliGRAM(s) Oral every 4 hours PRN Moderate Pain (4 - 6)  oxyCODONE    IR 10 milliGRAM(s) Oral every 4 hours PRN Severe Pain (7 - 10)    ==================== RESPIRATORY======================  Post op respiratory insufficiency  ====================CARDIOVASCULAR==================  Post op Hypovolemia  norepinephrine Infusion 0.05 MICROgram(s)/kG/Min (9.36 mL/Hr) IV Continuous <Continuous>    ===================HEMATOLOGIC/ONC ===================  Monitor H&H/Plts    aspirin enteric coated 325 milliGRAM(s) Oral daily  enoxaparin Injectable 40 milliGRAM(s) SubCutaneous every 24 hours    ===================== RENAL =========================  Continue monitoring urine output, I&OS, BUN/Cr     ==================== GASTROINTESTINAL===================  ascorbic acid 500 milliGRAM(s) Oral two times a day  dextrose 5%. 1000 milliLiter(s) (50 mL/Hr) IV Continuous <Continuous>  dextrose 5%. 1000 milliLiter(s) (100 mL/Hr) IV Continuous <Continuous>  pantoprazole    Tablet 40 milliGRAM(s) Oral daily  polyethylene glycol 3350 17 Gram(s) Oral daily  senna 2 Tablet(s) Oral at bedtime  sodium chloride 0.9% lock flush 10 milliLiter(s) IV Push every 1 hour PRN Pre/post blood products, medications, blood draw, and to maintain line patency  sodium chloride 0.9%. 1000 milliLiter(s) (10 mL/Hr) IV Continuous <Continuous>  sodium chloride 0.9%. 1000 milliLiter(s) (5 mL/Hr) IV Continuous <Continuous>    =======================    ENDOCRINE  =====================  dextrose 50% Injectable 50 milliLiter(s) IV Push every 15 minutes  dextrose 50% Injectable 25 milliLiter(s) IV Push every 15 minutes  dextrose Oral Gel 15 Gram(s) Oral once PRN Blood Glucose LESS THAN 70 milliGRAM(s)/deciliter  glucagon  Injectable 1 milliGRAM(s) IntraMuscular once  insulin lispro Injectable (ADMELOG) 4 Unit(s) SubCutaneous three times a day before meals  insulin regular Infusion 2 Unit(s)/Hr (2 mL/Hr) IV Continuous <Continuous>    ========================INFECTIOUS DISEASE================  cefuroxime  IVPB 1500 milliGRAM(s) IV Intermittent every 8 hours  vancomycin  IVPB 1000 milliGRAM(s) IV Intermittent every 12 hours    -Monitor Neurologic status ,   -Head of the bed should remain elevated to 45 degrees,  -Monitor for arrhythmias and monitor parameters for organ perfusion,  -Glycemic control is satisfactory,  -Nutritional goals will be met using po eventually , insure adequate caloric intake and monitor the same ,  -Electrolytes have been repleted as necessary , pain control has been achieved  and wound care has been carried out ,  -Stress ulcer and VTE prophylaxis will be achieved,  -Agressive PT and early mobility and ambulation goals will be met,    I have spent 135 minutes providing acute care for this critically ill patient     Patient requires continuous monitoring with bedside rhythm monitoring, pulse ox monitoring, and intermittent blood gas analysis. Care plan discussed with ICU care team. Patient remained critical and at risk for life threatening decompensation.

## 2025-01-11 NOTE — PHYSICAL THERAPY INITIAL EVALUATION ADULT - PHYSICAL ASSIST/NONPHYSICAL ASSIST: SUPINE/SIT, REHAB EVAL
Cardiac Procedure Note   Patient: Mana Aparicio  MRN: [id number]  SSN: [id number]   YOB: 1993 Age: 27 y.o. Sex: female    Date of Procedure: 10/11/2019   Pre-procedure Diagnosis: preexcitation syndrome  Post-procedure Diagnosis: same  Procedure: EP Study and Ablation   :  Dr. Annemarie Harada, MD    Assistant(s):  None  Anesthesia: Moderate Sedation   Estimated Blood Loss: Less than 10 mL   Specimens Removed: None  Findings: right posteroseptal pathway conducted but at 240 ms blocked and there was no SVT induced  She is going to [employer] service so this was ablated but it did not completely go away.  Kept coming back preexcited but it is blocked at 540 ms after ablation  Will reassess with treadmill exercise at follow up  Complications: None   Implants:  None  Signed by:  Annemarie Harada, MD  10/11/2019  6:20 PM verbal cues/1 person + 1 person to manage equipment

## 2025-01-11 NOTE — PHYSICAL THERAPY INITIAL EVALUATION ADULT - ADDITIONAL COMMENTS
Pt reports living with his spouse in a private house. 2 steps to enter, no rails. Does have ramp access through back door with one small step into kitchen.  Full flight to bedroom. Independent at baseline, no device. 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 Does own a SAC.

## 2025-01-12 DIAGNOSIS — E11.9 TYPE 2 DIABETES MELLITUS WITHOUT COMPLICATIONS: ICD-10-CM

## 2025-01-12 LAB
ANION GAP SERPL CALC-SCNC: 11 MMOL/L — SIGNIFICANT CHANGE UP (ref 5–17)
BUN SERPL-MCNC: 28.8 MG/DL — HIGH (ref 8–20)
CALCIUM SERPL-MCNC: 8 MG/DL — LOW (ref 8.4–10.5)
CHLORIDE SERPL-SCNC: 104 MMOL/L — SIGNIFICANT CHANGE UP (ref 96–108)
CO2 SERPL-SCNC: 24 MMOL/L — SIGNIFICANT CHANGE UP (ref 22–29)
CREAT SERPL-MCNC: 0.94 MG/DL — SIGNIFICANT CHANGE UP (ref 0.5–1.3)
EGFR: 84 ML/MIN/1.73M2 — SIGNIFICANT CHANGE UP
GLUCOSE BLDC GLUCOMTR-MCNC: 131 MG/DL — HIGH (ref 70–99)
GLUCOSE BLDC GLUCOMTR-MCNC: 150 MG/DL — HIGH (ref 70–99)
GLUCOSE BLDC GLUCOMTR-MCNC: 156 MG/DL — HIGH (ref 70–99)
GLUCOSE BLDC GLUCOMTR-MCNC: 207 MG/DL — HIGH (ref 70–99)
GLUCOSE SERPL-MCNC: 169 MG/DL — HIGH (ref 70–99)
HCT VFR BLD CALC: 26.1 % — LOW (ref 39–50)
HGB BLD-MCNC: 9 G/DL — LOW (ref 13–17)
MAGNESIUM SERPL-MCNC: 2.5 MG/DL — SIGNIFICANT CHANGE UP (ref 1.6–2.6)
MCHC RBC-ENTMCNC: 29.4 PG — SIGNIFICANT CHANGE UP (ref 27–34)
MCHC RBC-ENTMCNC: 34.5 G/DL — SIGNIFICANT CHANGE UP (ref 32–36)
MCV RBC AUTO: 85.3 FL — SIGNIFICANT CHANGE UP (ref 80–100)
PLATELET # BLD AUTO: 124 K/UL — LOW (ref 150–400)
POTASSIUM SERPL-MCNC: 4.4 MMOL/L — SIGNIFICANT CHANGE UP (ref 3.5–5.3)
POTASSIUM SERPL-SCNC: 4.4 MMOL/L — SIGNIFICANT CHANGE UP (ref 3.5–5.3)
RBC # BLD: 3.06 M/UL — LOW (ref 4.2–5.8)
RBC # FLD: 13.6 % — SIGNIFICANT CHANGE UP (ref 10.3–14.5)
SODIUM SERPL-SCNC: 139 MMOL/L — SIGNIFICANT CHANGE UP (ref 135–145)
WBC # BLD: 16.21 K/UL — HIGH (ref 3.8–10.5)
WBC # FLD AUTO: 16.21 K/UL — HIGH (ref 3.8–10.5)

## 2025-01-12 PROCEDURE — 99232 SBSQ HOSP IP/OBS MODERATE 35: CPT

## 2025-01-12 PROCEDURE — 71045 X-RAY EXAM CHEST 1 VIEW: CPT | Mod: 26

## 2025-01-12 PROCEDURE — 99291 CRITICAL CARE FIRST HOUR: CPT

## 2025-01-12 PROCEDURE — 99223 1ST HOSP IP/OBS HIGH 75: CPT

## 2025-01-12 PROCEDURE — 93010 ELECTROCARDIOGRAM REPORT: CPT

## 2025-01-12 RX ORDER — INSULIN LISPRO 100/ML
4 VIAL (ML) SUBCUTANEOUS
Refills: 0 | Status: DISCONTINUED | OUTPATIENT
Start: 2025-01-12 | End: 2025-01-15

## 2025-01-12 RX ORDER — FUROSEMIDE 20 MG
40 TABLET ORAL DAILY
Refills: 0 | Status: DISCONTINUED | OUTPATIENT
Start: 2025-01-12 | End: 2025-01-15

## 2025-01-12 RX ADMIN — Medication 3 UNIT(S): at 16:07

## 2025-01-12 RX ADMIN — CHLORHEXIDINE GLUCONATE 15 MILLILITER(S): 1.2 RINSE ORAL at 17:20

## 2025-01-12 RX ADMIN — ATORVASTATIN CALCIUM 80 MILLIGRAM(S): 40 TABLET, FILM COATED ORAL at 21:54

## 2025-01-12 RX ADMIN — GABAPENTIN 100 MILLIGRAM(S): 300 CAPSULE ORAL at 21:54

## 2025-01-12 RX ADMIN — INSULIN GLARGINE-YFGN 12 UNIT(S): 100 INJECTION, SOLUTION SUBCUTANEOUS at 22:03

## 2025-01-12 RX ADMIN — CLOPIDOGREL BISULFATE 75 MILLIGRAM(S): 75 TABLET, FILM COATED ORAL at 12:31

## 2025-01-12 RX ADMIN — Medication 4: at 16:06

## 2025-01-12 RX ADMIN — Medication 100 MILLIGRAM(S): at 03:48

## 2025-01-12 RX ADMIN — ACETAMINOPHEN 975 MILLIGRAM(S): 80 SOLUTION/ DROPS ORAL at 04:37

## 2025-01-12 RX ADMIN — ACETAMINOPHEN 975 MILLIGRAM(S): 80 SOLUTION/ DROPS ORAL at 12:29

## 2025-01-12 RX ADMIN — Medication 40 MILLIGRAM(S): at 12:30

## 2025-01-12 RX ADMIN — Medication 10 MILLIGRAM(S): at 21:55

## 2025-01-12 RX ADMIN — SENNOSIDES 2 TABLET(S): 8.6 TABLET, FILM COATED ORAL at 21:55

## 2025-01-12 RX ADMIN — CHLORHEXIDINE GLUCONATE 1 APPLICATION(S): 1.2 RINSE ORAL at 04:39

## 2025-01-12 RX ADMIN — Medication 325 MILLIGRAM(S): at 12:30

## 2025-01-12 RX ADMIN — Medication 5 MILLIGRAM(S): at 16:01

## 2025-01-12 RX ADMIN — Medication 12.5 MILLIGRAM(S): at 04:36

## 2025-01-12 RX ADMIN — Medication 17 GRAM(S): at 12:31

## 2025-01-12 RX ADMIN — Medication 500 MILLIGRAM(S): at 18:30

## 2025-01-12 RX ADMIN — GABAPENTIN 100 MILLIGRAM(S): 300 CAPSULE ORAL at 13:57

## 2025-01-12 RX ADMIN — GABAPENTIN 100 MILLIGRAM(S): 300 CAPSULE ORAL at 04:35

## 2025-01-12 RX ADMIN — Medication 500 MILLIGRAM(S): at 04:35

## 2025-01-12 RX ADMIN — Medication 12.5 MILLIGRAM(S): at 17:19

## 2025-01-12 RX ADMIN — Medication 100 MILLIGRAM(S): at 08:27

## 2025-01-12 RX ADMIN — PANTOPRAZOLE 40 MILLIGRAM(S): 40 TABLET, DELAYED RELEASE ORAL at 12:31

## 2025-01-12 RX ADMIN — Medication 3 UNIT(S): at 12:32

## 2025-01-12 RX ADMIN — CHLORHEXIDINE GLUCONATE 1 APPLICATION(S): 1.2 RINSE ORAL at 13:52

## 2025-01-12 RX ADMIN — Medication 2: at 08:27

## 2025-01-12 RX ADMIN — VANCOMYCIN HYDROCHLORIDE 250 MILLIGRAM(S): 5 INJECTION, POWDER, LYOPHILIZED, FOR SOLUTION INTRAVENOUS at 03:49

## 2025-01-12 RX ADMIN — ENOXAPARIN SODIUM 40 MILLIGRAM(S): 60 INJECTION INTRAVENOUS; SUBCUTANEOUS at 12:33

## 2025-01-12 RX ADMIN — Medication 3 UNIT(S): at 08:28

## 2025-01-12 RX ADMIN — EZETIMIBE 10 MILLIGRAM(S): 10 TABLET ORAL at 12:31

## 2025-01-12 RX ADMIN — Medication 10 MILLIGRAM(S): at 22:55

## 2025-01-12 RX ADMIN — ACETAMINOPHEN 975 MILLIGRAM(S): 80 SOLUTION/ DROPS ORAL at 13:00

## 2025-01-12 RX ADMIN — Medication 5 MILLIGRAM(S): at 17:00

## 2025-01-12 RX ADMIN — ACETAMINOPHEN 975 MILLIGRAM(S): 80 SOLUTION/ DROPS ORAL at 17:20

## 2025-01-12 RX ADMIN — VANCOMYCIN HYDROCHLORIDE 250 MILLIGRAM(S): 5 INJECTION, POWDER, LYOPHILIZED, FOR SOLUTION INTRAVENOUS at 15:51

## 2025-01-12 NOTE — CONSULT NOTE ADULT - ASSESSMENT
74M w/ HTN, asymptomatic bradycardia, h/o frequent PVCs originally presented to [facility] [place] [facility] for elective LHC and was found to have MVD and subsequently transferred to [facility] for CABG. 1/9 s/p 3V CABG  Consult for diabetes mgmt, a1c 6.6    T2DM- mild hyperglycemia but mostly acceptable  -A1c 6.6  -check sugars AC and bedtime  -ensure diabetic diet  -continue lantus 12 units QHS  -change to admelog 5 units TID  -continue with insulin sliding scale  -suggest nutritionist consult  -discharge meds to be determined, probably basal insulin with orals vs multiple orals  -new to insulin, needs to be taught  -1/11 TTE with normal EF    CAD- s/p 3v CABG, care per primary team    HLD- continue statin   74M w/ HTN, asymptomatic bradycardia, h/o frequent PVCs originally presented to [facility] for elective LHC and was found to have MVD and subsequently transferred to [facility] for CABG. 1/9 s/p 3V CABG  Consult for diabetes mgmt, a1c 6.6    New T2DM- mild hyperglycemia but mostly acceptable  -A1c 6.6  -check sugars AC and bedtime  -ensure diabetic diet  -continue lantus 12 units QHS  -change to admelog 4 units TID  -continue with insulin sliding scale  -suggest nutritionist consult  -likely does not need insulin with discharge. discussed with patient. can probably be dced on oral meds since patient does not have   -needs cde for meter teach  -1/11 TTE with normal EF  -patient can opt to come follow with us in the clinic (does not need to but given card)    CAD- s/p 3v CABG, care per primary team    HLD- continue statin

## 2025-01-12 NOTE — PROGRESS NOTE ADULT - SUBJECTIVE AND OBJECTIVE BOX
Subjective - patient seen and evaluated bedside. Sitting comfortably in bed. Denies CP, SOB, HA, dizziness, n/v/d    Review of Systems: negative x 10 systems except as noted above    Brief summary:  74yMale POD# 2 CABGx3    Significant/[id number] events: no acute events      PAST MEDICAL & SURGICAL HISTORY:  Hypertension      Bigeminy      Exostosis  left ear canal      BPH (benign prostatic hypertrophy)      Hard of hearing  Exostosis left ear  in May 2015      Lumbar disc disease      Hemorrhoid  2012  Hemorrhoidectomy      Lumbar disc disease  lumbar disc surgery 15 years ago      S/P cholecystectomy            acetaminophen     Tablet .. 975 milliGRAM(s) Oral every 6 hours  ascorbic acid 500 milliGRAM(s) Oral two times a day  aspirin enteric coated 325 milliGRAM(s) Oral daily  atorvastatin 80 milliGRAM(s) Oral at bedtime  bisacodyl Suppository 10 milliGRAM(s) Rectal once  cefuroxime  IVPB 1500 milliGRAM(s) IV Intermittent every 8 hours  chlorhexidine 0.12% Liquid 15 milliLiter(s) Oral Mucosa every 12 hours  chlorhexidine 2% Cloths 1 Application(s) Topical daily  chlorhexidine 4% Liquid 1 Application(s) Topical <User Schedule>  clopidogrel Tablet 75 milliGRAM(s) Oral daily  dextrose 5%. 1000 milliLiter(s) IV Continuous <Continuous>  dextrose 5%. 1000 milliLiter(s) IV Continuous <Continuous>  dextrose 50% Injectable 50 milliLiter(s) IV Push every 15 minutes  dextrose 50% Injectable 25 milliLiter(s) IV Push every 15 minutes  dextrose Oral Gel 15 Gram(s) Oral once PRN  enoxaparin Injectable 40 milliGRAM(s) SubCutaneous every 24 hours  ezetimibe 10 milliGRAM(s) Oral daily  gabapentin 100 milliGRAM(s) Oral every 8 hours  glucagon  Injectable 1 milliGRAM(s) IntraMuscular once  HYDROmorphone  Injectable 0.5 milliGRAM(s) IV Push every 6 hours PRN  insulin glargine Injectable (LANTUS) 12 Unit(s) SubCutaneous at bedtime  insulin lispro (ADMELOG) corrective regimen sliding scale   SubCutaneous at bedtime  insulin lispro (ADMELOG) corrective regimen sliding scale   SubCutaneous three times a day before meals  insulin lispro Injectable (ADMELOG) 3 Unit(s) SubCutaneous three times a day before meals  insulin regular Infusion 2 Unit(s)/Hr IV Continuous <Continuous>  metoprolol tartrate 12.5 milliGRAM(s) Oral two times a day  niCARdipine Infusion 5 mG/Hr IV Continuous <Continuous>  oxyCODONE    IR 5 milliGRAM(s) Oral every 4 hours PRN  oxyCODONE    IR 10 milliGRAM(s) Oral every 4 hours PRN  pantoprazole    Tablet 40 milliGRAM(s) Oral daily  polyethylene glycol 3350 17 Gram(s) Oral daily  senna 2 Tablet(s) Oral at bedtime  sodium chloride 0.9% lock flush 10 milliLiter(s) IV Push every 1 hour PRN  sodium chloride 0.9%. 1000 milliLiter(s) IV Continuous <Continuous>  sodium chloride 0.9%. 1000 milliLiter(s) IV Continuous <Continuous>  vancomycin  IVPB 1000 milliGRAM(s) IV Intermittent every 12 hours  MEDICATIONS  (PRN):  dextrose Oral Gel 15 Gram(s) Oral once PRN Blood Glucose LESS THAN 70 milliGRAM(s)/deciliter  HYDROmorphone  Injectable 0.5 milliGRAM(s) IV Push every 6 hours PRN Breakthrough Pain  oxyCODONE    IR 5 milliGRAM(s) Oral every 4 hours PRN Moderate Pain (4 - 6)  oxyCODONE    IR 10 milliGRAM(s) Oral every 4 hours PRN Severe Pain (7 - 10)  sodium chloride 0.9% lock flush 10 milliLiter(s) IV Push every 1 hour PRN Pre/post blood products, medications, blood draw, and to maintain line patency      Daily     Daily       ABG - ( 11 Jan 2025 11:17 )  pH, Arterial: 7.410 pH, Blood: x     /  pCO2: 40    /  pO2: 60    / HCO3: 25    / Base Excess: 0.8   /  SaO2: 93.9                                    10.3   13.27 )-----------( 142      ( 11 Jan 2025 02:00 )             30.7   01-11    138  |  101  |  29.3[H]  ----------------------------<  153[H]  4.1   |  23.0  |  1.04    Ca    8.7      11 Jan 2025 11:20  Mg     2.9     01-11    TPro  5.9[L]  /  Alb  4.0  /  TBili  0.7  /  DBili  x   /  AST  68[H]  /  ALT  34  /  AlkPhos  34[L]  01-11    CARDIAC MARKERS ( 11 Jan 2025 08:45 )  x     / x     / x     / x     / 45.1 ng/mL  CARDIAC MARKERS ( 11 Jan 2025 02:00 )  x     / x     / x     / x     / 45.1 ng/mL  CARDIAC MARKERS ( 10 Sae 2025 20:15 )  x     / x     / x     / x     / 40.8 ng/mL    PT/INR - ( 11 Jan 2025 11:20 )   PT: 13.4 sec;   INR: 1.16 ratio         PTT - ( 11 Jan 2025 02:00 )  PTT:31.4 sec      Objective:  T(C): 37.1 (01-12-25 @ 00:00), Max: 38.1 (01-11-25 @ 17:45)  HR: 61 (01-12-25 @ 00:00) (49 - 70)  BP: 153/78 (01-12-25 @ 00:00) (116/53 - 164/80)  RR: 17 (01-12-25 @ 00:00) (0 - 25)  SpO2: 98% (01-12-25 @ 00:00) (90% - 99%)  Wt(kg): --CAPILLARY BLOOD GLUCOSE      POCT Blood Glucose.: 203 mg/dL (11 Jan 2025 23:17)  POCT Blood Glucose.: 129 mg/dL (11 Jan 2025 21:09)  POCT Blood Glucose.: 180 mg/dL (11 Jan 2025 19:11)  POCT Blood Glucose.: 211 mg/dL (11 Jan 2025 18:05)  POCT Blood Glucose.: 202 mg/dL (11 Jan 2025 16:24)  POCT Blood Glucose.: 170 mg/dL (11 Jan 2025 12:55)  POCT Blood Glucose.: 119 mg/dL (11 Jan 2025 08:46)  POCT Blood Glucose.: 138 mg/dL (11 Jan 2025 07:01)  POCT Blood Glucose.: 148 mg/dL (11 Jan 2025 05:59)  POCT Blood Glucose.: 178 mg/dL (11 Jan 2025 04:42)  POCT Blood Glucose.: 184 mg/dL (11 Jan 2025 03:54)  POCT Blood Glucose.: 216 mg/dL (11 Jan 2025 02:12)  POCT Blood Glucose.: 228 mg/dL (11 Jan 2025 01:11)  I&O's Summary    10 Sae 2025 07:01  -  11 Jan 2025 07:00  --------------------------------------------------------  IN: 2898.2 mL / OUT: 1985 mL / NET: 913.2 mL    11 Jan 2025 07:01  -  12 Jan 2025 00:39  --------------------------------------------------------  IN: 2107 mL / OUT: 1715 mL / NET: 392 mL        Physical Exam  General: NAD  Neuro: A+O x 3, non-focal, speech clear and intact  Psych: Appropriate affect  HEENT:  NCAT, No conjuctival edema or icterus, no thrush.  Pulm: CTA, equal bilaterally  CV: RRR,+S1S2  Abd: soft, NT, ND, +BS  Ext: +DP Pulses b/l, no edema  Skin: Warm, dry, intact  Inc: MSI C/D/I/stable w/ dressing, LE vein harvest site C/D/I with Ace wrap  Chest tubes: mediastinal and pleural chest tubes to sxn draining appropriately no AL        Imaging:    < from: Xray Chest 1 View- PORTABLE-Routine (01.11.25 @ 06:13) >    INTERPRETATION:  HISTORY: Admitting Dxs: I25.1 ATHEROSCLEROTIC HEART   DISEASE;  INTRAOP;  TECHNIQUE: Portable frontal view of the chest, 1 view.  COMPARISON: 1/9/2025.  FINDINGS/  IMPRESSION:  Right central line SVC. The endotracheal tube is above the steven. The   nasogastric tube courses below the left hemidiaphragm, tip overlies the   stomach. The sidehole is at the EG junction. There are bilateral chest   tubes.  HEART:  Enlarged.  LUNGS: Scattered atelectasis. No pneumothorax. No effusion.  BONES: sternotomy wires    Follow-up portable chest 1/11/2025 5:15 AM shows extubation and removal   of the NG tube. Chest tubes are unchanged. Mild interstitial edema and   atelectasis left base. No pneumothorax    < end of copied text >

## 2025-01-12 NOTE — PROGRESS NOTE ADULT - ASSESSMENT
73y/o male with PMHx HTN, asymptomatic bradycardia, h/o frequent PVCs presented to [facility] for elective LHC. Pt reports having outpatient CT calcium scan which was elevated. Pt was referred to [facility] for LHC today, revealing TVD - pLAD 90%, oCx 75%, and %. EF normal at 60%. Pt transferred to [facility] for CABG work up.

## 2025-01-12 NOTE — CONSULT NOTE ADULT - SUBJECTIVE AND OBJECTIVE BOX
Patient is a 74y old  Male who presents with a chief complaint of CAD (12 Jan 2025 00:39)    HPI:  74M w/ HTN, asymptomatic bradycardia, h/o frequent PVCs originally presented to [facility] for elective LHC and was found to have MVD and subsequently transferred to [facility] for CABG. 1/9 s/p 3V CABG  Consult for diabetes mgmt, a1c 6.6    currently on lantus 12 units with admelog 3 units      PAST MEDICAL & SURGICAL HISTORY:  Hypertension    Bigeminy    Exostosis  left ear canal    BPH (benign prostatic hypertrophy)    Hard of hearing  Exostosis left ear  in May 2015    Lumbar disc disease    Hemorrhoid  2012  Hemorrhoidectomy    Lumbar disc disease  lumbar disc surgery 15 years ago    S/P cholecystectomy        Social History:  Lives at home with wife, 2 story house with 2 steps to get in  Independent with ADLs and ambulation  Retired - former  (07 Jan 2025 19:04)      FAMILY HISTORY:  Family history of cancer    FH: HTN (hypertension) (Father)          Allergies    No Known Allergies    Intolerances        ROS as noted in the HPI    MEDICATIONS  (STANDING):  acetaminophen     Tablet .. 975 milliGRAM(s) Oral every 6 hours  ascorbic acid 500 milliGRAM(s) Oral two times a day  aspirin enteric coated 325 milliGRAM(s) Oral daily  atorvastatin 80 milliGRAM(s) Oral at bedtime  bisacodyl Suppository 10 milliGRAM(s) Rectal once  chlorhexidine 0.12% Liquid 15 milliLiter(s) Oral Mucosa every 12 hours  chlorhexidine 2% Cloths 1 Application(s) Topical daily  chlorhexidine 4% Liquid 1 Application(s) Topical <User Schedule>  clopidogrel Tablet 75 milliGRAM(s) Oral daily  dextrose 5%. 1000 milliLiter(s) (50 mL/Hr) IV Continuous <Continuous>  dextrose 5%. 1000 milliLiter(s) (100 mL/Hr) IV Continuous <Continuous>  dextrose 50% Injectable 50 milliLiter(s) IV Push every 15 minutes  dextrose 50% Injectable 25 milliLiter(s) IV Push every 15 minutes  enoxaparin Injectable 40 milliGRAM(s) SubCutaneous every 24 hours  ezetimibe 10 milliGRAM(s) Oral daily  furosemide    Tablet 40 milliGRAM(s) Oral daily  gabapentin 100 milliGRAM(s) Oral every 8 hours  glucagon  Injectable 1 milliGRAM(s) IntraMuscular once  insulin glargine Injectable (LANTUS) 12 Unit(s) SubCutaneous at bedtime  insulin lispro (ADMELOG) corrective regimen sliding scale   SubCutaneous at bedtime  insulin lispro (ADMELOG) corrective regimen sliding scale   SubCutaneous three times a day before meals  insulin lispro Injectable (ADMELOG) 3 Unit(s) SubCutaneous three times a day before meals  metoprolol tartrate 12.5 milliGRAM(s) Oral two times a day  pantoprazole    Tablet 40 milliGRAM(s) Oral daily  polyethylene glycol 3350 17 Gram(s) Oral daily  senna 2 Tablet(s) Oral at bedtime  sodium chloride 0.9%. 1000 milliLiter(s) (10 mL/Hr) IV Continuous <Continuous>  sodium chloride 0.9%. 1000 milliLiter(s) (5 mL/Hr) IV Continuous <Continuous>  vancomycin  IVPB 1000 milliGRAM(s) IV Intermittent every 12 hours    MEDICATIONS  (PRN):  dextrose Oral Gel 15 Gram(s) Oral once PRN Blood Glucose LESS THAN 70 milliGRAM(s)/deciliter  HYDROmorphone  Injectable 0.5 milliGRAM(s) IV Push every 6 hours PRN Breakthrough Pain  oxyCODONE    IR 5 milliGRAM(s) Oral every 4 hours PRN Moderate Pain (4 - 6)  oxyCODONE    IR 10 milliGRAM(s) Oral every 4 hours PRN Severe Pain (7 - 10)  sodium chloride 0.9% lock flush 10 milliLiter(s) IV Push every 1 hour PRN Pre/post blood products, medications, blood draw, and to maintain line patency      Vital Signs Last 24 Hrs  T(C): 37.1 (12 Jan 2025 08:00), Max: 38.1 (11 Jan 2025 17:45)  T(F): 98.8 (12 Jan 2025 08:00), Max: 100.6 (11 Jan 2025 17:45)  HR: 61 (12 Jan 2025 08:00) (52 - 70)  BP: 141/69 (12 Jan 2025 08:00) (120/67 - 167/80)  BP(mean): 90 (12 Jan 2025 08:00) (76 - 115)  RR: 19 (12 Jan 2025 08:00) (11 - 25)  SpO2: 94% (12 Jan 2025 08:00) (90% - 99%)    Parameters below as of 12 Jan 2025 08:00  Patient On (Oxygen Delivery Method): nasal cannula  O2 Flow (L/min): 2        Physical Exam:    Constitutional: NAD, well-developed  Neck: trachea midline, no thyroid enlargement  Respiratory: CTAB, normal respirations, bandaged anterior chest  Cardiovascular: S1 and S2, RRR  Gastrointestinal: BS+, soft, ntnd  Extremities: +peripheral edema  Neurological: AOx3, no focal deficits  Psychiatric: Normal mood and normal affect  Skin: no rashes, no acanthosis    LABS  01-12    139  |  104  |  28.8[H]  ----------------------------<  169[H]  4.4   |  24.0  |  0.94    Ca    8.0[L]      12 Jan 2025 02:15  Mg     2.5     01-12    TPro  5.9[L]  /  Alb  4.0  /  TBili  0.7  /  DBili  x   /  AST  68[H]  /  ALT  34  /  AlkPhos  34[L]  01-11                          9.0    16.21 )-----------( 124      ( 12 Jan 2025 02:15 )             26.1       A1C with Estimated Average Glucose Result: 6.6 % (01-07-25 @ 19:20)          Alanine Aminotransferase (ALT/SGPT): 34 U/L (01-11-25 @ 09:43)  Alkaline Phosphatase: 34 U/L (01-11-25 @ 09:43)  Albumin: 4.0 g/dL (01-11-25 @ 09:43)  Aspartate Aminotransferase (AST/SGOT): 68 U/L (01-11-25 @ 09:43)  Alanine Aminotransferase (ALT/SGPT): 32 U/L (01-10-25 @ 20:15)  Alkaline Phosphatase: 46 U/L (01-10-25 @ 20:15)  Albumin: 3.2 g/dL (01-10-25 @ 20:15)  Aspartate Aminotransferase (AST/SGOT): 63 U/L (01-10-25 @ 20:15)    Triiodothyronine, Total (T3 Total): 134 ng/dL (01-07-25 @ 19:20)  T4, Serum: 6.5 ug/dL (01-07-25 @ 19:20)  Free Thyroxine, Serum: 0.9 ng/dL (01-07-25 @ 19:20)  Thyroid Stimulating Hormone, Serum: 3.55 uIU/mL (01-07-25 @ 19:20)        CAPILLARY BLOOD GLUCOSE      POCT Blood Glucose.: 156 mg/dL (12 Jan 2025 08:25)  POCT Blood Glucose.: 203 mg/dL (11 Jan 2025 23:17)  POCT Blood Glucose.: 129 mg/dL (11 Jan 2025 21:09)  POCT Blood Glucose.: 180 mg/dL (11 Jan 2025 19:11)  POCT Blood Glucose.: 211 mg/dL (11 Jan 2025 18:05)  POCT Blood Glucose.: 202 mg/dL (11 Jan 2025 16:24)  POCT Blood Glucose.: 170 mg/dL (11 Jan 2025 12:55)      Imaging     Patient is a 74y old  Male who presents with a chief complaint of CAD (12 Jan 2025 00:39)    HPI:  74M w/ HTN, asymptomatic bradycardia, h/o frequent PVCs, prediabetes originally presented to [facility] for elective LHC and was found to have MVD and subsequently transferred to [facility] for CABG. 1/9 s/p 3V CABG  Consult for diabetes mgmt, a1c 6.6    currently on lantus 12 units with admelog 3 units  was told that he had borderline diabetes  no fhx of diabetes  sees pmd  no smoking/drugs. occ etoh  works as a  for [employer]    has a good appetite         PAST MEDICAL & SURGICAL HISTORY:  Hypertension    Bigeminy    Exostosis  left ear canal    BPH (benign prostatic hypertrophy)    Hard of hearing  Exostosis left ear  in May 2015    Lumbar disc disease    Hemorrhoid  2012  Hemorrhoidectomy    Lumbar disc disease  lumbar disc surgery 15 years ago    S/P cholecystectomy        Social History:  Lives at home with wife, 2 story house with 2 steps to get in  Independent with ADLs and ambulation  Retired - former  (07 Jan 2025 19:04)      FAMILY HISTORY:  Family history of cancer    FH: HTN (hypertension) (Father)          Allergies    No Known Allergies    Intolerances        ROS as noted in the HPI    MEDICATIONS  (STANDING):  acetaminophen     Tablet .. 975 milliGRAM(s) Oral every 6 hours  ascorbic acid 500 milliGRAM(s) Oral two times a day  aspirin enteric coated 325 milliGRAM(s) Oral daily  atorvastatin 80 milliGRAM(s) Oral at bedtime  bisacodyl Suppository 10 milliGRAM(s) Rectal once  chlorhexidine 0.12% Liquid 15 milliLiter(s) Oral Mucosa every 12 hours  chlorhexidine 2% Cloths 1 Application(s) Topical daily  chlorhexidine 4% Liquid 1 Application(s) Topical <User Schedule>  clopidogrel Tablet 75 milliGRAM(s) Oral daily  dextrose 5%. 1000 milliLiter(s) (50 mL/Hr) IV Continuous <Continuous>  dextrose 5%. 1000 milliLiter(s) (100 mL/Hr) IV Continuous <Continuous>  dextrose 50% Injectable 50 milliLiter(s) IV Push every 15 minutes  dextrose 50% Injectable 25 milliLiter(s) IV Push every 15 minutes  enoxaparin Injectable 40 milliGRAM(s) SubCutaneous every 24 hours  ezetimibe 10 milliGRAM(s) Oral daily  furosemide    Tablet 40 milliGRAM(s) Oral daily  gabapentin 100 milliGRAM(s) Oral every 8 hours  glucagon  Injectable 1 milliGRAM(s) IntraMuscular once  insulin glargine Injectable (LANTUS) 12 Unit(s) SubCutaneous at bedtime  insulin lispro (ADMELOG) corrective regimen sliding scale   SubCutaneous at bedtime  insulin lispro (ADMELOG) corrective regimen sliding scale   SubCutaneous three times a day before meals  insulin lispro Injectable (ADMELOG) 3 Unit(s) SubCutaneous three times a day before meals  metoprolol tartrate 12.5 milliGRAM(s) Oral two times a day  pantoprazole    Tablet 40 milliGRAM(s) Oral daily  polyethylene glycol 3350 17 Gram(s) Oral daily  senna 2 Tablet(s) Oral at bedtime  sodium chloride 0.9%. 1000 milliLiter(s) (10 mL/Hr) IV Continuous <Continuous>  sodium chloride 0.9%. 1000 milliLiter(s) (5 mL/Hr) IV Continuous <Continuous>  vancomycin  IVPB 1000 milliGRAM(s) IV Intermittent every 12 hours    MEDICATIONS  (PRN):  dextrose Oral Gel 15 Gram(s) Oral once PRN Blood Glucose LESS THAN 70 milliGRAM(s)/deciliter  HYDROmorphone  Injectable 0.5 milliGRAM(s) IV Push every 6 hours PRN Breakthrough Pain  oxyCODONE    IR 5 milliGRAM(s) Oral every 4 hours PRN Moderate Pain (4 - 6)  oxyCODONE    IR 10 milliGRAM(s) Oral every 4 hours PRN Severe Pain (7 - 10)  sodium chloride 0.9% lock flush 10 milliLiter(s) IV Push every 1 hour PRN Pre/post blood products, medications, blood draw, and to maintain line patency      Vital Signs Last 24 Hrs  T(C): 37.1 (12 Jan 2025 08:00), Max: 38.1 (11 Jan 2025 17:45)  T(F): 98.8 (12 Jan 2025 08:00), Max: 100.6 (11 Jan 2025 17:45)  HR: 61 (12 Jan 2025 08:00) (52 - 70)  BP: 141/69 (12 Jan 2025 08:00) (120/67 - 167/80)  BP(mean): 90 (12 Jan 2025 08:00) (76 - 115)  RR: 19 (12 Jan 2025 08:00) (11 - 25)  SpO2: 94% (12 Jan 2025 08:00) (90% - 99%)    Parameters below as of 12 Jan 2025 08:00  Patient On (Oxygen Delivery Method): nasal cannula  O2 Flow (L/min): 2        Physical Exam:    Constitutional: NAD, well-developed  Neck: trachea midline, no thyroid enlargement  Respiratory: CTAB, normal respirations, bandaged anterior chest  Cardiovascular: S1 and S2, RRR  Gastrointestinal: BS+, soft, ntnd  Extremities: LLE bandaged  Neurological: AOx3, no focal deficits  Psychiatric: Normal mood and normal affect  Skin: no rashes, no acanthosis    LABS  01-12    139  |  104  |  28.8[H]  ----------------------------<  169[H]  4.4   |  24.0  |  0.94    Ca    8.0[L]      12 Jan 2025 02:15  Mg     2.5     01-12    TPro  5.9[L]  /  Alb  4.0  /  TBili  0.7  /  DBili  x   /  AST  68[H]  /  ALT  34  /  AlkPhos  34[L]  01-11                          9.0    16.21 )-----------( 124      ( 12 Jan 2025 02:15 )             26.1       A1C with Estimated Average Glucose Result: 6.6 % (01-07-25 @ 19:20)          Alanine Aminotransferase (ALT/SGPT): 34 U/L (01-11-25 @ 09:43)  Alkaline Phosphatase: 34 U/L (01-11-25 @ 09:43)  Albumin: 4.0 g/dL (01-11-25 @ 09:43)  Aspartate Aminotransferase (AST/SGOT): 68 U/L (01-11-25 @ 09:43)  Alanine Aminotransferase (ALT/SGPT): 32 U/L (01-10-25 @ 20:15)  Alkaline Phosphatase: 46 U/L (01-10-25 @ 20:15)  Albumin: 3.2 g/dL (01-10-25 @ 20:15)  Aspartate Aminotransferase (AST/SGOT): 63 U/L (01-10-25 @ 20:15)    Triiodothyronine, Total (T3 Total): 134 ng/dL (01-07-25 @ 19:20)  T4, Serum: 6.5 ug/dL (01-07-25 @ 19:20)  Free Thyroxine, Serum: 0.9 ng/dL (01-07-25 @ 19:20)  Thyroid Stimulating Hormone, Serum: 3.55 uIU/mL (01-07-25 @ 19:20)        CAPILLARY BLOOD GLUCOSE      POCT Blood Glucose.: 156 mg/dL (12 Jan 2025 08:25)  POCT Blood Glucose.: 203 mg/dL (11 Jan 2025 23:17)  POCT Blood Glucose.: 129 mg/dL (11 Jan 2025 21:09)  POCT Blood Glucose.: 180 mg/dL (11 Jan 2025 19:11)  POCT Blood Glucose.: 211 mg/dL (11 Jan 2025 18:05)  POCT Blood Glucose.: 202 mg/dL (11 Jan 2025 16:24)  POCT Blood Glucose.: 170 mg/dL (11 Jan 2025 12:55)      Imaging

## 2025-01-12 NOTE — PROGRESS NOTE ADULT - SUBJECTIVE AND OBJECTIVE BOX
ROYAL AYSHA  MRN-[id number]    HPI:  73y/o male with PMHx HTN, asymptomatic bradycardia, h/o frequent PVCs presented to [facility] for elective LHC. Pt reports having outpatient CT calcium scan which was elevated. Pt was referred to [facility] for LHC today, revealing TVD - pLAD 90%, oCx 75%, and %. EF normal at 60%. Pt transferred to [facility] for CABG work up.     Pt seen and examined at bedside. In no acute distress, resting comfortably in hospital bed. Pt reports being completely asymptomatic prior to CT calcium score. Denies any current fever, chills, dizziness, lightheadedness, HA, chest pain, palpitations, SOB, abdominal pain, N/V/D, LE edema, urinary symptoms. Pt does endorse RLE tenderness on his inner calf for the past 1.5 months. States he had a RLE US 2 weeks ago which was negative for a dvt.  (07 Jan 2025 19:04)    ICU Vital Signs Last 24 Hrs  T(C): 37.1 (12 Jan 2025 08:00), Max: 38.1 (11 Jan 2025 17:45)  T(F): 98.8 (12 Jan 2025 08:00), Max: 100.6 (11 Jan 2025 17:45)  HR: 71 (12 Jan 2025 14:00) (52 - 71)  BP: 140/69 (12 Jan 2025 14:00) (120/67 - 167/80)  BP(mean): 90 (12 Jan 2025 14:00) (76 - 115)  ABP: 144/58 (12 Jan 2025 05:00) (78/42 - 173/63)  ABP(mean): 88 (12 Jan 2025 05:00) (-21 - 163)  RR: 22 (12 Jan 2025 14:00) (11 - 26)  SpO2: 96% (12 Jan 2025 14:00) (90% - 99%)    O2 Parameters below as of 12 Jan 2025 10:00  Patient On (Oxygen Delivery Method): nasal cannula  O2 Flow (L/min): 2          Physical Exam:  Gen: A&O   CNS: non focal 	  Neck: no JVD  RES : clear , no wheezing              CVS: Regular  rhythm. Normal S1/S2  Abd: Soft, non-distended. Bowel sounds present.  Skin: No rash.  Ext:  no edema    ============================I/O===========================   I&O's Detail    11 Jan 2025 07:01  -  12 Jan 2025 07:00  --------------------------------------------------------  IN:    Insulin: 37 mL    IV PiggyBack: 200 mL    IV PiggyBack: 500 mL    IV PiggyBack: 100 mL    NiCARdipine: 140 mL    Oral Fluid: 1440 mL    sodium chloride 0.9%: 230 mL    sodium chloride 0.9%: 115 mL  Total IN: 2762 mL    OUT:    Chest Tube (mL): 280 mL    Chest Tube (mL): 110 mL    Chest Tube (mL): 20 mL    Indwelling Catheter - Urethral (mL): 1715 mL  Total OUT: 2125 mL    Total NET: 637 mL      12 Jan 2025 07:01  -  12 Jan 2025 14:42  --------------------------------------------------------  IN:    IV PiggyBack: 50 mL    Oral Fluid: 240 mL  Total IN: 290 mL    OUT:    Chest Tube (mL): 0 mL    Chest Tube (mL): 0 mL  Total OUT: 0 mL    Total NET: 290 mL        ============================ LABS =========================                        9.0    16.21 )-----------( 124      ( 12 Jan 2025 02:15 )             26.1     01-12    139  |  104  |  28.8[H]  ----------------------------<  169[H]  4.4   |  24.0  |  0.94    Ca    8.0[L]      12 Jan 2025 02:15  Mg     2.5     01-12    TPro  5.9[L]  /  Alb  4.0  /  TBili  0.7  /  DBili  x   /  AST  68[H]  /  ALT  34  /  AlkPhos  34[L]  01-11    LIVER FUNCTIONS - ( 11 Jan 2025 09:43 )  Alb: 4.0 g/dL / Pro: 5.9 g/dL / ALK PHOS: 34 U/L / ALT: 34 U/L / AST: 68 U/L / GGT: x           PT/INR - ( 11 Jan 2025 11:20 )   PT: 13.4 sec;   INR: 1.16 ratio         PTT - ( 11 Jan 2025 02:00 )  PTT:31.4 sec  ABG - ( 11 Jan 2025 11:17 )  pH, Arterial: 7.410 pH, Blood: x     /  pCO2: 40    /  pO2: 60    / HCO3: 25    / Base Excess: 0.8   /  SaO2: 93.9              Urinalysis Basic - ( 12 Jan 2025 02:15 )    Color: x / Appearance: x / SG: x / pH: x  Gluc: 169 mg/dL / Ketone: x  / Bili: x / Urobili: x   Blood: x / Protein: x / Nitrite: x   Leuk Esterase: x / RBC: x / WBC x   Sq Epi: x / Non Sq Epi: x / Bacteria: x      ======================Micro/Rad/Cardio=================  Culture: Reviewed   CXR: Reviewed  Echo:Reviewed  ======================================================  PAST MEDICAL & SURGICAL HISTORY:  Hypertension      Bigeminy      Exostosis  left ear canal      BPH (benign prostatic hypertrophy)      Hard of hearing  Exostosis left ear  in May 2015      Lumbar disc disease      Hemorrhoid  2012  Hemorrhoidectomy      Lumbar disc disease  lumbar disc surgery 15 years ago      S/P cholecystectomy        ====================ASSESSMENT ==============  Hypertension  Bigeminy  CAD  S/p CABG, with KANNAN 10-Sae-2025         CABG X 3 LIMA -LAD , V-OM , V-PDA  BPH (benign prostatic hypertrophy)  Post op Hypovolemia  Post op respiratory insufficiency       Plan:  Beta blocker as tolerated by HR and SBP   Lipitor for chronic graft patency prophylaxis  Aspirin for acute graft closure prophylaxis  PLAVIX  Chest PT and IS use with bedside nurse  Analgesic regimen to optimize function with Tylenol and Narcotics   Continue GI ppx with Protonix and Senna  DVT ppx with Lovenox and SCD boots  Strict glucose control and management for SWI ppx ,   Lasix 40 mg po  DC L chest tube  DC Cordis , DC A line    ====================== NEUROLOGY=====================  acetaminophen     Tablet .. 975 milliGRAM(s) Oral every 6 hours  gabapentin 100 milliGRAM(s) Oral every 8 hours  HYDROmorphone  Injectable 0.5 milliGRAM(s) IV Push every 6 hours PRN Breakthrough Pain  oxyCODONE    IR 5 milliGRAM(s) Oral every 4 hours PRN Moderate Pain (4 - 6)  oxyCODONE    IR 10 milliGRAM(s) Oral every 4 hours PRN Severe Pain (7 - 10)    ==================== RESPIRATORY======================  Post op respiratory insufficiency  ====================CARDIOVASCULAR==================  Post op Hypovolemia  furosemide    Tablet 40 milliGRAM(s) Oral daily  metoprolol tartrate 12.5 milliGRAM(s) Oral two times a day    ===================HEMATOLOGIC/ONC ===================  Monitor H&H/Plts    aspirin enteric coated 325 milliGRAM(s) Oral daily  clopidogrel Tablet 75 milliGRAM(s) Oral daily  enoxaparin Injectable 40 milliGRAM(s) SubCutaneous every 24 hours    ===================== RENAL =========================  Continue monitoring urine output, I&OS, BUN/Cr     ==================== GASTROINTESTINAL===================  ascorbic acid 500 milliGRAM(s) Oral two times a day  bisacodyl Suppository 10 milliGRAM(s) Rectal once  dextrose 5%. 1000 milliLiter(s) (50 mL/Hr) IV Continuous <Continuous>  dextrose 5%. 1000 milliLiter(s) (100 mL/Hr) IV Continuous <Continuous>  pantoprazole    Tablet 40 milliGRAM(s) Oral daily  polyethylene glycol 3350 17 Gram(s) Oral daily  senna 2 Tablet(s) Oral at bedtime  sodium chloride 0.9% lock flush 10 milliLiter(s) IV Push every 1 hour PRN Pre/post blood products, medications, blood draw, and to maintain line patency  sodium chloride 0.9%. 1000 milliLiter(s) (10 mL/Hr) IV Continuous <Continuous>  sodium chloride 0.9%. 1000 milliLiter(s) (5 mL/Hr) IV Continuous <Continuous>    =======================    ENDOCRINE  =====================  atorvastatin 80 milliGRAM(s) Oral at bedtime  dextrose 50% Injectable 50 milliLiter(s) IV Push every 15 minutes  dextrose 50% Injectable 25 milliLiter(s) IV Push every 15 minutes  dextrose Oral Gel 15 Gram(s) Oral once PRN Blood Glucose LESS THAN 70 milliGRAM(s)/deciliter  ezetimibe 10 milliGRAM(s) Oral daily  glucagon  Injectable 1 milliGRAM(s) IntraMuscular once  insulin glargine Injectable (LANTUS) 12 Unit(s) SubCutaneous at bedtime  insulin lispro (ADMELOG) corrective regimen sliding scale   SubCutaneous at bedtime  insulin lispro (ADMELOG) corrective regimen sliding scale   SubCutaneous three times a day before meals  insulin lispro Injectable (ADMELOG) 3 Unit(s) SubCutaneous three times a day before meals    ========================INFECTIOUS DISEASE================  vancomycin  IVPB 1000 milliGRAM(s) IV Intermittent every 12 hours        -Monitor Neurologic status ,   -Head of the bed should remain elevated to 45 degrees,  -Monitor for arrhythmias and monitor parameters for organ perfusion,  -Glycemic control is satisfactory,  -Nutritional goals will be met using po eventually , insure adequate caloric intake and monitor the same ,  -Electrolytes have been repleted as necessary , pain control has been achieved  and wound care has been carried out ,  -Stress ulcer and VTE prophylaxis will be achieved,  -Agressive PT and early mobility and ambulation goals will be met,  I have spent 35 minutes providing acute care for this critically ill patient     Patient requires continuous monitoring with bedside rhythm monitoring, pulse ox monitoring, and intermittent blood gas analysis. Care plan discussed with ICU care team. Patient remained critical and at risk for life threatening decompensation.

## 2025-01-13 ENCOUNTER — RESULT REVIEW (OUTPATIENT)
Age: 75
End: 2025-01-13

## 2025-01-13 LAB
ANION GAP SERPL CALC-SCNC: 9 MMOL/L — SIGNIFICANT CHANGE UP (ref 5–17)
BLD GP AB SCN SERPL QL: SIGNIFICANT CHANGE UP
BUN SERPL-MCNC: 30.3 MG/DL — HIGH (ref 8–20)
CALCIUM SERPL-MCNC: 7.7 MG/DL — LOW (ref 8.4–10.5)
CHLORIDE SERPL-SCNC: 104 MMOL/L — SIGNIFICANT CHANGE UP (ref 96–108)
CO2 SERPL-SCNC: 26 MMOL/L — SIGNIFICANT CHANGE UP (ref 22–29)
CREAT SERPL-MCNC: 0.88 MG/DL — SIGNIFICANT CHANGE UP (ref 0.5–1.3)
EGFR: 89 ML/MIN/1.73M2 — SIGNIFICANT CHANGE UP
GLUCOSE BLDC GLUCOMTR-MCNC: 117 MG/DL — HIGH (ref 70–99)
GLUCOSE BLDC GLUCOMTR-MCNC: 128 MG/DL — HIGH (ref 70–99)
GLUCOSE BLDC GLUCOMTR-MCNC: 129 MG/DL — HIGH (ref 70–99)
GLUCOSE BLDC GLUCOMTR-MCNC: 141 MG/DL — HIGH (ref 70–99)
GLUCOSE SERPL-MCNC: 136 MG/DL — HIGH (ref 70–99)
HCT VFR BLD CALC: 24.7 % — LOW (ref 39–50)
HCT VFR BLD CALC: 30 % — LOW (ref 39–50)
HGB BLD-MCNC: 10.2 G/DL — LOW (ref 13–17)
HGB BLD-MCNC: 8.3 G/DL — LOW (ref 13–17)
MAGNESIUM SERPL-MCNC: 2 MG/DL — SIGNIFICANT CHANGE UP (ref 1.6–2.6)
MCHC RBC-ENTMCNC: 28.9 PG — SIGNIFICANT CHANGE UP (ref 27–34)
MCHC RBC-ENTMCNC: 29.7 PG — SIGNIFICANT CHANGE UP (ref 27–34)
MCHC RBC-ENTMCNC: 33.6 G/DL — SIGNIFICANT CHANGE UP (ref 32–36)
MCHC RBC-ENTMCNC: 34 G/DL — SIGNIFICANT CHANGE UP (ref 32–36)
MCV RBC AUTO: 86.1 FL — SIGNIFICANT CHANGE UP (ref 80–100)
MCV RBC AUTO: 87.5 FL — SIGNIFICANT CHANGE UP (ref 80–100)
PLATELET # BLD AUTO: 121 K/UL — LOW (ref 150–400)
PLATELET # BLD AUTO: 131 K/UL — LOW (ref 150–400)
POTASSIUM SERPL-MCNC: 4.1 MMOL/L — SIGNIFICANT CHANGE UP (ref 3.5–5.3)
POTASSIUM SERPL-SCNC: 4.1 MMOL/L — SIGNIFICANT CHANGE UP (ref 3.5–5.3)
RBC # BLD: 2.87 M/UL — LOW (ref 4.2–5.8)
RBC # BLD: 3.43 M/UL — LOW (ref 4.2–5.8)
RBC # FLD: 13.7 % — SIGNIFICANT CHANGE UP (ref 10.3–14.5)
RBC # FLD: 14 % — SIGNIFICANT CHANGE UP (ref 10.3–14.5)
SODIUM SERPL-SCNC: 139 MMOL/L — SIGNIFICANT CHANGE UP (ref 135–145)
WBC # BLD: 10.17 K/UL — SIGNIFICANT CHANGE UP (ref 3.8–10.5)
WBC # BLD: 10.9 K/UL — HIGH (ref 3.8–10.5)
WBC # FLD AUTO: 10.17 K/UL — SIGNIFICANT CHANGE UP (ref 3.8–10.5)
WBC # FLD AUTO: 10.9 K/UL — HIGH (ref 3.8–10.5)

## 2025-01-13 PROCEDURE — 99292 CRITICAL CARE ADDL 30 MIN: CPT

## 2025-01-13 PROCEDURE — 71045 X-RAY EXAM CHEST 1 VIEW: CPT | Mod: 26

## 2025-01-13 PROCEDURE — 99291 CRITICAL CARE FIRST HOUR: CPT

## 2025-01-13 PROCEDURE — 99232 SBSQ HOSP IP/OBS MODERATE 35: CPT

## 2025-01-13 PROCEDURE — 93010 ELECTROCARDIOGRAM REPORT: CPT

## 2025-01-13 PROCEDURE — 93306 TTE W/DOPPLER COMPLETE: CPT | Mod: 26

## 2025-01-13 RX ORDER — METHOCARBAMOL 500 MG
750 TABLET ORAL THREE TIMES A DAY
Refills: 0 | Status: DISCONTINUED | OUTPATIENT
Start: 2025-01-13 | End: 2025-01-15

## 2025-01-13 RX ORDER — POTASSIUM CHLORIDE 600 MG/1
40 TABLET, FILM COATED, EXTENDED RELEASE ORAL ONCE
Refills: 0 | Status: COMPLETED | OUTPATIENT
Start: 2025-01-13 | End: 2025-01-14

## 2025-01-13 RX ORDER — FLUTICASONE PROPIONATE 50 UG/1
1 SPRAY, METERED NASAL
Refills: 0 | Status: COMPLETED | OUTPATIENT
Start: 2025-01-13 | End: 2025-01-15

## 2025-01-13 RX ADMIN — ACETAMINOPHEN 975 MILLIGRAM(S): 80 SOLUTION/ DROPS ORAL at 06:41

## 2025-01-13 RX ADMIN — Medication 4 UNIT(S): at 11:06

## 2025-01-13 RX ADMIN — PANTOPRAZOLE 40 MILLIGRAM(S): 40 TABLET, DELAYED RELEASE ORAL at 11:03

## 2025-01-13 RX ADMIN — EZETIMIBE 10 MILLIGRAM(S): 10 TABLET ORAL at 11:06

## 2025-01-13 RX ADMIN — Medication 750 MILLIGRAM(S): at 11:03

## 2025-01-13 RX ADMIN — Medication 500 MILLIGRAM(S): at 18:38

## 2025-01-13 RX ADMIN — SENNOSIDES 2 TABLET(S): 8.6 TABLET, FILM COATED ORAL at 21:57

## 2025-01-13 RX ADMIN — GABAPENTIN 100 MILLIGRAM(S): 300 CAPSULE ORAL at 21:57

## 2025-01-13 RX ADMIN — ACETAMINOPHEN 975 MILLIGRAM(S): 80 SOLUTION/ DROPS ORAL at 11:03

## 2025-01-13 RX ADMIN — Medication 500 MILLIGRAM(S): at 06:41

## 2025-01-13 RX ADMIN — Medication 4 UNIT(S): at 07:43

## 2025-01-13 RX ADMIN — ACETAMINOPHEN 975 MILLIGRAM(S): 80 SOLUTION/ DROPS ORAL at 18:36

## 2025-01-13 RX ADMIN — CLOPIDOGREL BISULFATE 75 MILLIGRAM(S): 75 TABLET, FILM COATED ORAL at 11:04

## 2025-01-13 RX ADMIN — ACETAMINOPHEN 975 MILLIGRAM(S): 80 SOLUTION/ DROPS ORAL at 12:03

## 2025-01-13 RX ADMIN — Medication 325 MILLIGRAM(S): at 11:04

## 2025-01-13 RX ADMIN — Medication 12.5 MILLIGRAM(S): at 06:40

## 2025-01-13 RX ADMIN — FLUTICASONE PROPIONATE 1 SPRAY(S): 50 SPRAY, METERED NASAL at 23:39

## 2025-01-13 RX ADMIN — ACETAMINOPHEN 975 MILLIGRAM(S): 80 SOLUTION/ DROPS ORAL at 19:36

## 2025-01-13 RX ADMIN — Medication 750 MILLIGRAM(S): at 21:57

## 2025-01-13 RX ADMIN — Medication 4 UNIT(S): at 16:42

## 2025-01-13 RX ADMIN — GABAPENTIN 100 MILLIGRAM(S): 300 CAPSULE ORAL at 06:40

## 2025-01-13 RX ADMIN — Medication 40 MILLIGRAM(S): at 06:41

## 2025-01-13 RX ADMIN — Medication 17 GRAM(S): at 11:04

## 2025-01-13 RX ADMIN — ENOXAPARIN SODIUM 40 MILLIGRAM(S): 60 INJECTION INTRAVENOUS; SUBCUTANEOUS at 11:04

## 2025-01-13 RX ADMIN — Medication 12.5 MILLIGRAM(S): at 18:37

## 2025-01-13 RX ADMIN — INSULIN GLARGINE-YFGN 12 UNIT(S): 100 INJECTION, SOLUTION SUBCUTANEOUS at 21:56

## 2025-01-13 RX ADMIN — ATORVASTATIN CALCIUM 80 MILLIGRAM(S): 40 TABLET, FILM COATED ORAL at 21:57

## 2025-01-13 RX ADMIN — CHLORHEXIDINE GLUCONATE 1 APPLICATION(S): 1.2 RINSE ORAL at 07:23

## 2025-01-13 RX ADMIN — CHLORHEXIDINE GLUCONATE 1 APPLICATION(S): 1.2 RINSE ORAL at 11:05

## 2025-01-13 NOTE — DIETITIAN INITIAL EVALUATION ADULT - NSFNSGIIOFT_GEN_A_CORE
01-12-25 @ 07:01  -  01-13-25 @ 07:00  --------------------------------------------------------  OUT:    Chest Tube (mL): 120 mL    Chest Tube (mL): 40 mL  Total OUT: 160 mL    Total NET: -160 mL      01-13-25 @ 07:01 - 01-13-25 @ 12:46  --------------------------------------------------------  OUT:    Chest Tube (mL): 40 mL    Chest Tube (mL): 80 mL  Total OUT: 120 mL    Total NET: -120 mL

## 2025-01-13 NOTE — PROGRESS NOTE ADULT - SUBJECTIVE AND OBJECTIVE BOX
Subjective:   74yMale s/p CABG x3 with Dr. Baltazra. Denies chest pain, palpitations, SOB, S[place], cough, N/V/D/C.      PAST MEDICAL & SURGICAL HISTORY:  Hypertension      Bigeminy      Exostosis  left ear canal      BPH (benign prostatic hypertrophy)      Hard of hearing  Exostosis left ear  in May 2015      Lumbar disc disease      Hemorrhoid  2012  Hemorrhoidectomy      Lumbar disc disease  lumbar disc surgery 15 years ago      S/P cholecystectomy          Medications:  acetaminophen     Tablet .. 975 milliGRAM(s) Oral every 6 hours  acetaminophen     Tablet .. 650 milliGRAM(s) Oral every 6 hours PRN  ascorbic acid 500 milliGRAM(s) Oral two times a day  aspirin enteric coated 325 milliGRAM(s) Oral daily  atorvastatin 80 milliGRAM(s) Oral at bedtime  bisacodyl Suppository 10 milliGRAM(s) Rectal once  chlorhexidine 0.12% Liquid 15 milliLiter(s) Oral Mucosa every 12 hours  chlorhexidine 2% Cloths 1 Application(s) Topical daily  chlorhexidine 4% Liquid 1 Application(s) Topical <User Schedule>  clopidogrel Tablet 75 milliGRAM(s) Oral daily  dextrose 5%. 1000 milliLiter(s) IV Continuous <Continuous>  dextrose 5%. 1000 milliLiter(s) IV Continuous <Continuous>  dextrose 50% Injectable 50 milliLiter(s) IV Push every 15 minutes  dextrose 50% Injectable 25 milliLiter(s) IV Push every 15 minutes  dextrose Oral Gel 15 Gram(s) Oral once PRN  enoxaparin Injectable 40 milliGRAM(s) SubCutaneous every 24 hours  ezetimibe 10 milliGRAM(s) Oral daily  furosemide    Tablet 40 milliGRAM(s) Oral daily  gabapentin 100 milliGRAM(s) Oral every 8 hours  glucagon  Injectable 1 milliGRAM(s) IntraMuscular once  insulin glargine Injectable (LANTUS) 12 Unit(s) SubCutaneous at bedtime  insulin lispro (ADMELOG) corrective regimen sliding scale   SubCutaneous at bedtime  insulin lispro (ADMELOG) corrective regimen sliding scale   SubCutaneous three times a day before meals  insulin lispro Injectable (ADMELOG) 4 Unit(s) SubCutaneous three times a day before meals  metoprolol tartrate 12.5 milliGRAM(s) Oral two times a day  oxyCODONE    IR 5 milliGRAM(s) Oral every 4 hours PRN  oxyCODONE    IR 10 milliGRAM(s) Oral every 4 hours PRN  pantoprazole    Tablet 40 milliGRAM(s) Oral daily  polyethylene glycol 3350 17 Gram(s) Oral daily  senna 2 Tablet(s) Oral at bedtime  sodium chloride 0.9% lock flush 10 milliLiter(s) IV Push every 1 hour PRN  sodium chloride 0.9%. 1000 milliLiter(s) IV Continuous <Continuous>  sodium chloride 0.9%. 1000 milliLiter(s) IV Continuous <Continuous>      MEDICATIONS  (PRN):  acetaminophen     Tablet .. 650 milliGRAM(s) Oral every 6 hours PRN Mild Pain (1 - 3)  dextrose Oral Gel 15 Gram(s) Oral once PRN Blood Glucose LESS THAN 70 milliGRAM(s)/deciliter  oxyCODONE    IR 5 milliGRAM(s) Oral every 4 hours PRN Moderate Pain (4 - 6)  oxyCODONE    IR 10 milliGRAM(s) Oral every 4 hours PRN Severe Pain (7 - 10)  sodium chloride 0.9% lock flush 10 milliLiter(s) IV Push every 1 hour PRN Pre/post blood products, medications, blood draw, and to maintain line patency      Daily Review:      ABG - ( 11 Jan 2025 11:17 )  pH, Arterial: 7.410 pH, Blood: x     /  pCO2: 40    /  pO2: 60    / HCO3: 25    / Base Excess: 0.8   /  SaO2: 93.9                                    9.0    16.21 )-----------( 124      ( 12 Jan 2025 02:15 )             26.1   01-12    139  |  104  |  28.8[H]  ----------------------------<  169[H]  4.4   |  24.0  |  0.94    Ca    8.0[L]      12 Jan 2025 02:15  Mg     2.5     01-12    TPro  5.9[L]  /  Alb  4.0  /  TBili  0.7  /  DBili  x   /  AST  68[H]  /  ALT  34  /  AlkPhos  34[L]  01-11    CARDIAC MARKERS ( 11 Jan 2025 08:45 )  x     / x     / x     / x     / 45.1 ng/mL    PT/INR - ( 11 Jan 2025 11:20 )   PT: 13.4 sec;   INR: 1.16 ratio             T(C): 37.1 (01-13-25 @ 00:00), Max: 37.4 (01-12-25 @ 16:00)  HR: 62 (01-13-25 @ 01:00) (55 - 73)  BP: 113/68 (01-13-25 @ 01:00) (113/68 - 167/80)  RR: 12 (01-13-25 @ 01:00) (11 - 26)  SpO2: 96% (01-13-25 @ 01:00) (92% - 98%)  Wt(kg): --    CAPILLARY BLOOD GLUCOSE      POCT Blood Glucose.: 150 mg/dL (12 Jan 2025 22:01)  POCT Blood Glucose.: 207 mg/dL (12 Jan 2025 16:05)  POCT Blood Glucose.: 131 mg/dL (12 Jan 2025 12:04)  POCT Blood Glucose.: 156 mg/dL (12 Jan 2025 08:25)      I&O's Summary    11 Jan 2025 07:01  -  12 Jan 2025 07:00  --------------------------------------------------------  IN: 2762 mL / OUT: 2125 mL / NET: 637 mL    12 Jan 2025 07:01  -  13 Jan 2025 02:31  --------------------------------------------------------  IN: 1250 mL / OUT: 530 mL / NET: 720 mL          PHYSICAL EXAM:  GENERAL: No acute distress, well-developed  NECK: no JVD  CHEST/LUNG: CTAB; No wheezes, rales, or rhonchi  HEART: RRR; No murmurs, rubs, or gallops  ABDOMEN: Soft, non-tender, non-distended; normal bowel sounds  EXTREMITIES:  2+ peripheral pulses b/l, No clubbing, cyanosis, or edema  NEUROLOGY: AAO x 4

## 2025-01-13 NOTE — DIETITIAN INITIAL EVALUATION ADULT - ORAL INTAKE PTA/DIET HISTORY
Pt reports eating well PTA; denies any recent weight changes. Pt reports UBW ~220lbs. Pt currently denies difficulty chewing or swallowing post op. Reviewed and reinforced therapeutic diet guidelines; further literature declined at this time.

## 2025-01-13 NOTE — PROGRESS NOTE ADULT - SUBJECTIVE AND OBJECTIVE BOX
CRITICAL CARE ATTENDING - CTICU    MEDICATIONS  (STANDING):  acetaminophen     Tablet .. 975 milliGRAM(s) Oral every 6 hours  ascorbic acid 500 milliGRAM(s) Oral two times a day  aspirin enteric coated 325 milliGRAM(s) Oral daily  atorvastatin 80 milliGRAM(s) Oral at bedtime  bisacodyl Suppository 10 milliGRAM(s) Rectal once  chlorhexidine 0.12% Liquid 15 milliLiter(s) Oral Mucosa every 12 hours  chlorhexidine 2% Cloths 1 Application(s) Topical daily  chlorhexidine 4% Liquid 1 Application(s) Topical <User Schedule>  clopidogrel Tablet 75 milliGRAM(s) Oral daily  dextrose 5%. 1000 milliLiter(s) (50 mL/Hr) IV Continuous <Continuous>  dextrose 5%. 1000 milliLiter(s) (100 mL/Hr) IV Continuous <Continuous>  dextrose 50% Injectable 50 milliLiter(s) IV Push every 15 minutes  dextrose 50% Injectable 25 milliLiter(s) IV Push every 15 minutes  enoxaparin Injectable 40 milliGRAM(s) SubCutaneous every 24 hours  ezetimibe 10 milliGRAM(s) Oral daily  furosemide    Tablet 40 milliGRAM(s) Oral daily  gabapentin 100 milliGRAM(s) Oral every 8 hours  glucagon  Injectable 1 milliGRAM(s) IntraMuscular once  insulin glargine Injectable (LANTUS) 12 Unit(s) SubCutaneous at bedtime  insulin lispro (ADMELOG) corrective regimen sliding scale   SubCutaneous at bedtime  insulin lispro (ADMELOG) corrective regimen sliding scale   SubCutaneous three times a day before meals  insulin lispro Injectable (ADMELOG) 4 Unit(s) SubCutaneous three times a day before meals  metoprolol tartrate 12.5 milliGRAM(s) Oral two times a day  pantoprazole    Tablet 40 milliGRAM(s) Oral daily  polyethylene glycol 3350 17 Gram(s) Oral daily  potassium chloride   Powder 40 milliEquivalent(s) Oral once  senna 2 Tablet(s) Oral at bedtime  sodium chloride 0.9%. 1000 milliLiter(s) (10 mL/Hr) IV Continuous <Continuous>  sodium chloride 0.9%. 1000 milliLiter(s) (5 mL/Hr) IV Continuous <Continuous>                                    8.3    10.90 )-----------( 121      ( 2025 02:30 )             24.7       01-13    139  |  104  |  30.3[H]  ----------------------------<  136[H]  4.1   |  26.0  |  0.88    Ca    7.7[L]      2025 02:30  Mg     2.0         TPro  5.9[L]  /  Alb  4.0  /  TBili  0.7  /  DBili  x   /  AST  68[H]  /  ALT  34  /  AlkPhos  34[L]        PT/INR - ( 2025 11:20 )   PT: 13.4 sec;   INR: 1.16 ratio                 Daily     Daily Weight in k.5 (2025 04:54)       @ 07:01  -   @ 07:00  --------------------------------------------------------  IN: 1490 mL / OUT: 560 mL / NET: 930 mL        Critically Ill patient  : [ ] preoperative ,   [x ] post operative    Requires :  [ x] Arterial Line   x ] Central Line  [ ] PA catheter  [ ] IABP  [ ] ECMO  [ ] LVAD  [ ] Ventilator  [x ] pacemaker - TPM  [ x]   NC                       [ x] ABG's     [x ] Pulse Oxymetry Monitoring  Bedside evaluation , monitoring , treatment of hemodynamics , fluids , IVP/ IVCD meds.        Diagnosis:     POD 3 - CABG X 3 L     Hypotension - resolved     Hypovolemia     Prerenal Azotemia     Hemodynamic lability,  instability. Requires IVCD [ ] vasopressors [ ] inotropes  [x ] Lopressor trial  [x ]IVSS fluid  to maintain MAP, perfusion, C.I.     Respiratory insuffiencey     Requires Supplemental Oxygen Therapy     Hypoxemia - Requires  [ x] Nasal Canula     Requires chest PT, pulmonary toilet,  suctioning to maintain SaO2,  patent airway and treat atelectasis.     Thrombocytopenia     Prerenal Azotemia     Temporary pacemaker (TPM) interrogation and setting.     Bradycardia     Chest Tube Drainage / Management     Requires bedside physical therapy, mobilization and total [facility] care.     TTE Today     IVCD Insulin converted to Lantus + Admelog                         Discussed with CT surgeon, Physician's Assistant - Nurse Practitioner- Critical care medicine team.   Discussed at  AM / PM rounds.   Chart, labs , films reviewed.    Cumulative Critical Care Time Given Today : 35 min

## 2025-01-13 NOTE — PROGRESS NOTE ADULT - SUBJECTIVE AND OBJECTIVE BOX
INTERVAL EVENTS:  Follow up diabetes management, glucoses stable.     MEDICATIONS  (STANDING):  acetaminophen     Tablet .. 975 milliGRAM(s) Oral every 6 hours  ascorbic acid 500 milliGRAM(s) Oral two times a day  aspirin enteric coated 325 milliGRAM(s) Oral daily  atorvastatin 80 milliGRAM(s) Oral at bedtime  bisacodyl Suppository 10 milliGRAM(s) Rectal once  chlorhexidine 0.12% Liquid 15 milliLiter(s) Oral Mucosa every 12 hours  chlorhexidine 2% Cloths 1 Application(s) Topical daily  chlorhexidine 4% Liquid 1 Application(s) Topical <User Schedule>  clopidogrel Tablet 75 milliGRAM(s) Oral daily  dextrose 5%. 1000 milliLiter(s) (50 mL/Hr) IV Continuous <Continuous>  dextrose 5%. 1000 milliLiter(s) (100 mL/Hr) IV Continuous <Continuous>  dextrose 50% Injectable 50 milliLiter(s) IV Push every 15 minutes  dextrose 50% Injectable 25 milliLiter(s) IV Push every 15 minutes  enoxaparin Injectable 40 milliGRAM(s) SubCutaneous every 24 hours  ezetimibe 10 milliGRAM(s) Oral daily  furosemide    Tablet 40 milliGRAM(s) Oral daily  gabapentin 100 milliGRAM(s) Oral every 8 hours  glucagon  Injectable 1 milliGRAM(s) IntraMuscular once  insulin glargine Injectable (LANTUS) 12 Unit(s) SubCutaneous at bedtime  insulin lispro (ADMELOG) corrective regimen sliding scale   SubCutaneous at bedtime  insulin lispro (ADMELOG) corrective regimen sliding scale   SubCutaneous three times a day before meals  insulin lispro Injectable (ADMELOG) 4 Unit(s) SubCutaneous three times a day before meals  metoprolol tartrate 12.5 milliGRAM(s) Oral two times a day  pantoprazole    Tablet 40 milliGRAM(s) Oral daily  polyethylene glycol 3350 17 Gram(s) Oral daily  potassium chloride   Powder 40 milliEquivalent(s) Oral once  senna 2 Tablet(s) Oral at bedtime  sodium chloride 0.9%. 1000 milliLiter(s) (10 mL/Hr) IV Continuous <Continuous>  sodium chloride 0.9%. 1000 milliLiter(s) (5 mL/Hr) IV Continuous <Continuous>    MEDICATIONS  (PRN):  acetaminophen     Tablet .. 650 milliGRAM(s) Oral every 6 hours PRN Mild Pain (1 - 3)  dextrose Oral Gel 15 Gram(s) Oral once PRN Blood Glucose LESS THAN 70 milliGRAM(s)/deciliter  oxyCODONE    IR 5 milliGRAM(s) Oral every 4 hours PRN Moderate Pain (4 - 6)  oxyCODONE    IR 10 milliGRAM(s) Oral every 4 hours PRN Severe Pain (7 - 10)  sodium chloride 0.9% lock flush 10 milliLiter(s) IV Push every 1 hour PRN Pre/post blood products, medications, blood draw, and to maintain line patency    Allergies  No Known Allergies    Vital Signs Last 24 Hrs  T(C): 37.1 (13 Jan 2025 07:00), Max: 37.4 (12 Jan 2025 16:00)  T(F): 98.8 (13 Jan 2025 07:00), Max: 99.4 (12 Jan 2025 16:00)  HR: 66 (13 Jan 2025 09:00) (51 - 73)  BP: 166/87 (13 Jan 2025 09:00) (113/68 - 166/87)  BP(mean): 109 (13 Jan 2025 09:00) (79 - 109)  RR: 22 (13 Jan 2025 09:00) (11 - 26)  SpO2: 96% (13 Jan 2025 09:00) (91% - 98%)    Parameters below as of 13 Jan 2025 08:00  Patient On (Oxygen Delivery Method): nasal cannula  O2 Flow (L/min): 2    PHYSICAL EXAM:  General: No apparent distress  Respiratory: Lungs clear bilaterally  Cardiac: +S1, S2, no m/r/g  GI: +BS, soft, non tender, non distended  Extremities: No peripheral edema  Neuro: A+O X3    LABS:                        8.3    10.90 )-----------( 121      ( 13 Jan 2025 02:30 )             24.7     01-13    139  |  104  |  30.3[H]  ----------------------------<  136[H]  4.1   |  26.0  |  0.88    Ca    7.7[L]      13 Jan 2025 02:30  Mg     2.0     01-13      Urinalysis Basic - ( 13 Jan 2025 02:30 )    Color: x / Appearance: x / SG: x / pH: x  Gluc: 136 mg/dL / Ketone: x  / Bili: x / Urobili: x   Blood: x / Protein: x / Nitrite: x   Leuk Esterase: x / RBC: x / WBC x   Sq Epi: x / Non Sq Epi: x / Bacteria: x    POCT Blood Glucose.: 128 mg/dL (01-13-25 @ 07:41)  POCT Blood Glucose.: 150 mg/dL (01-12-25 @ 22:01)  POCT Blood Glucose.: 207 mg/dL (01-12-25 @ 16:05)  POCT Blood Glucose.: 131 mg/dL (01-12-25 @ 12:04)    Triiodothyronine, Total (T3 Total): 134 ng/dL (01-07-25 @ 19:20)  Free Thyroxine, Serum: 0.9 ng/dL (01-07-25 @ 19:20)  Thyroid Stimulating Hormone, Serum: 3.55 uIU/mL (01-07-25 @ 19:20)

## 2025-01-13 NOTE — DIETITIAN INITIAL EVALUATION ADULT - OTHER INFO
Pt is a 74 year old male PMHx HTN, asymptomatic bradycardia, h/o frequent PVCs presented to [facility] [place] for elective LHC.   Pt is now S/p CABG x3.

## 2025-01-13 NOTE — PROGRESS NOTE ADULT - SUBJECTIVE AND OBJECTIVE BOX
ROYAL AYSHA  MRN-[id number]    HPI:  73y/o male with PMHx HTN, asymptomatic bradycardia, h/o frequent PVCs presented to [facility] for elective LHC. Pt reports having outpatient CT calcium scan which was elevated. Pt was referred to [facility] for LHC today, revealing TVD - pLAD 90%, oCx 75%, and %. EF normal at 60%. Pt transferred to [facility] for CABG work up.     Pt seen and examined at bedside. In no acute distress, resting comfortably in hospital bed. Pt reports being completely asymptomatic prior to CT calcium score. Denies any current fever, chills, dizziness, lightheadedness, HA, chest pain, palpitations, SOB, abdominal pain, N/V/D, LE edema, urinary symptoms. Pt does endorse RLE tenderness on his inner calf for the past 1.5 months. States he had a RLE US 2 weeks ago which was negative for a dvt.  (07 Jan 2025 19:04)    ICU Vital Signs Last 24 Hrs  T(C): 37.2 (13 Jan 2025 12:00), Max: 37.2 (13 Jan 2025 12:00)  T(F): 98.9 (13 Jan 2025 12:00), Max: 98.9 (13 Jan 2025 12:00)  HR: 69 (13 Jan 2025 16:00) (51 - 76)  BP: 126/67 (13 Jan 2025 16:00) (108/83 - 166/87)  BP(mean): 83 (13 Jan 2025 16:00) (70 - 109)  ABP: --  ABP(mean): --  RR: 19 (13 Jan 2025 16:00) (11 - 23)  SpO2: 93% (13 Jan 2025 16:00) (91% - 98%)    O2 Parameters below as of 13 Jan 2025 16:00  Patient On (Oxygen Delivery Method): nasal cannula  O2 Flow (L/min): 2          Physical Exam:  Gen: A&O   CNS: non focal 	  Neck: no JVD  RES : clear , no wheezing              CVS: Regular  rhythm. Normal S1/S2  Abd: Soft, non-distended. Bowel sounds present.  Skin: No rash.  Ext:  no edema    ============================I/O===========================   I&O's Detail    12 Jan 2025 07:01  -  13 Jan 2025 07:00  --------------------------------------------------------  IN:    IV PiggyBack: 50 mL    Oral Fluid: 1440 mL  Total IN: 1490 mL    OUT:    Chest Tube (mL): 120 mL    Chest Tube (mL): 40 mL    Voided (mL): 400 mL  Total OUT: 560 mL    Total NET: 930 mL      13 Jan 2025 07:01  -  13 Jan 2025 16:30  --------------------------------------------------------  IN:    Oral Fluid: 240 mL    PRBCs (Packed Red Blood Cells): 347 mL  Total IN: 587 mL    OUT:    Chest Tube (mL): 40 mL    Chest Tube (mL): 80 mL    Voided (mL): 1000 mL  Total OUT: 1120 mL    Total NET: -533 mL        ============================ LABS =========================                        8.3    10.90 )-----------( 121      ( 13 Jan 2025 02:30 )             24.7     01-13    139  |  104  |  30.3[H]  ----------------------------<  136[H]  4.1   |  26.0  |  0.88    Ca    7.7[L]      13 Jan 2025 02:30  Mg     2.0     01-13            Urinalysis Basic - ( 13 Jan 2025 02:30 )    Color: x / Appearance: x / SG: x / pH: x  Gluc: 136 mg/dL / Ketone: x  / Bili: x / Urobili: x   Blood: x / Protein: x / Nitrite: x   Leuk Esterase: x / RBC: x / WBC x   Sq Epi: x / Non Sq Epi: x / Bacteria: x      ======================Micro/Rad/Cardio=================  Culture: Reviewed   CXR: Reviewed  Echo:Reviewed  ======================================================  PAST MEDICAL & SURGICAL HISTORY:  Hypertension      Bigeminy      Exostosis  left ear canal      BPH (benign prostatic hypertrophy)      Hard of hearing  Exostosis left ear  in May 2015      Lumbar disc disease      Hemorrhoid  2012  Hemorrhoidectomy      Lumbar disc disease  lumbar disc surgery 15 years ago      S/P cholecystectomy        ====================ASSESSMENT ==============  73y/o male with PMHx HTN, asymptomatic bradycardia, h/o frequent PVCs presented to [facility] for elective LHC. Pt reports having outpatient CT calcium scan which was elevated. Pt was referred to [facility] for LHC today, revealing TVD - pLAD 90%, oCx 75%, and %. EF normal at 60%. Pt transferred to [facility] for CABG work up. Now s/p CABG x3 with Dr. Baltazar.-    Hypertension  Bigeminy  CAD  S/p CABG, with KANNAN 10-Sae-2025         CABG X 3 LIMA -LAD , V-OM , V-PDA  BPH (benign prostatic hypertrophy)  Post op Hypovolemia  Post op respiratory insufficiency       Plan:  Beta blocker as tolerated by HR and SBP   Lipitor for chronic graft patency prophylaxis  Aspirin for acute graft closure prophylaxis  PLAVIX  Chest PT and IS use with bedside nurse  Analgesic regimen to optimize function with Tylenol and Narcotics   Continue GI ppx with Protonix and Senna  DVT ppx with Lovenox and SCD boots  Strict glucose control and management for SWI ppx ,     ====================== NEUROLOGY=====================  acetaminophen     Tablet .. 975 milliGRAM(s) Oral every 6 hours  acetaminophen     Tablet .. 650 milliGRAM(s) Oral every 6 hours PRN Mild Pain (1 - 3)  gabapentin 100 milliGRAM(s) Oral every 8 hours  methocarbamol 750 milliGRAM(s) Oral three times a day  oxyCODONE    IR 5 milliGRAM(s) Oral every 4 hours PRN Moderate Pain (4 - 6)  oxyCODONE    IR 10 milliGRAM(s) Oral every 4 hours PRN Severe Pain (7 - 10)    ==================== RESPIRATORY======================  Post op respiratory insufficiency  ====================CARDIOVASCULAR==================  Post op Hypovolemia  furosemide    Tablet 40 milliGRAM(s) Oral daily  metoprolol tartrate 12.5 milliGRAM(s) Oral two times a day    ===================HEMATOLOGIC/ONC ===================  Monitor H&H/Plts    aspirin enteric coated 325 milliGRAM(s) Oral daily  clopidogrel Tablet 75 milliGRAM(s) Oral daily  enoxaparin Injectable 40 milliGRAM(s) SubCutaneous every 24 hours    ===================== RENAL =========================  Continue monitoring urine output, I&OS, BUN/Cr     ==================== GASTROINTESTINAL===================  ascorbic acid 500 milliGRAM(s) Oral two times a day  bisacodyl Suppository 10 milliGRAM(s) Rectal once  dextrose 5%. 1000 milliLiter(s) (50 mL/Hr) IV Continuous <Continuous>  dextrose 5%. 1000 milliLiter(s) (100 mL/Hr) IV Continuous <Continuous>  pantoprazole    Tablet 40 milliGRAM(s) Oral daily  polyethylene glycol 3350 17 Gram(s) Oral daily  potassium chloride   Powder 40 milliEquivalent(s) Oral once  senna 2 Tablet(s) Oral at bedtime  sodium chloride 0.9% lock flush 10 milliLiter(s) IV Push every 1 hour PRN Pre/post blood products, medications, blood draw, and to maintain line patency  sodium chloride 0.9%. 1000 milliLiter(s) (10 mL/Hr) IV Continuous <Continuous>  sodium chloride 0.9%. 1000 milliLiter(s) (5 mL/Hr) IV Continuous <Continuous>    =======================    ENDOCRINE  =====================  atorvastatin 80 milliGRAM(s) Oral at bedtime  dextrose 50% Injectable 50 milliLiter(s) IV Push every 15 minutes  dextrose 50% Injectable 25 milliLiter(s) IV Push every 15 minutes  dextrose Oral Gel 15 Gram(s) Oral once PRN Blood Glucose LESS THAN 70 milliGRAM(s)/deciliter  ezetimibe 10 milliGRAM(s) Oral daily  glucagon  Injectable 1 milliGRAM(s) IntraMuscular once  insulin glargine Injectable (LANTUS) 12 Unit(s) SubCutaneous at bedtime  insulin lispro (ADMELOG) corrective regimen sliding scale   SubCutaneous at bedtime  insulin lispro (ADMELOG) corrective regimen sliding scale   SubCutaneous three times a day before meals  insulin lispro Injectable (ADMELOG) 4 Unit(s) SubCutaneous three times a day before meals    ========================INFECTIOUS DISEASE================      -Monitor Neurologic status ,   -Head of the bed should remain elevated to 45 degrees,  -Monitor for arrhythmias and monitor parameters for organ perfusion,  -Glycemic control is satisfactory,  -Nutritional goals will be met using po eventually , insure adequate caloric intake and monitor the same ,  -Electrolytes have been repleted as necessary , pain control has been achieved  and wound care has been carried out ,  -Stress ulcer and VTE prophylaxis will be achieved,  -Agressive PT and early mobility and ambulation goals will be met,      I have spent 35 minutes providing acute care for this critically ill patient     Patient requires continuous monitoring with bedside rhythm monitoring, pulse ox monitoring, and intermittent blood gas analysis. Care plan discussed with ICU care team. Patient remained critical and at risk for life threatening decompensation.

## 2025-01-13 NOTE — PROGRESS NOTE ADULT - ASSESSMENT
74M w/ HTN, asymptomatic bradycardia, h/o frequent PVCs originally presented to [facility] for elective LHC and was found to have MVD and subsequently transferred to [facility], s/p CABG. Consult for new onset, a1c 6.6%.    1. Newly diagnosed diabetes, a1c 6.6%  - Continue lantus 12 units qhs  - Continue admelog 4 units TID  - Correction scale  - RD consult for dietary education  - Can likely be discharged on oral medications    2. CAD  - S/p 3v CABG, care per CT surgery    3. HLD  - Continue statin

## 2025-01-13 NOTE — PROGRESS NOTE ADULT - ASSESSMENT
75y/o male with PMHx HTN, asymptomatic bradycardia, h/o frequent PVCs presented to [facility] for elective LHC. Pt reports having outpatient CT calcium scan which was elevated. Pt was referred to [facility] for LHC today, revealing TVD - pLAD 90%, oCx 75%, and %. EF normal at 60%. Pt transferred to [facility] for CABG work up. Now s/p CABG x3 with Dr. Baltazar.

## 2025-01-13 NOTE — DIETITIAN INITIAL EVALUATION ADULT - PROBLEM SELECTOR PLAN 1
Pt asymptomatic presented to [facility] for elective LHC after elevated CT calcium score. Transferred to [facility] for further CABG eval.   LHC today revealing pLAD 90%, oCx 75%, dRCA 100%, EF 60%.   Preop work up ordered including carotid US to assess for carotid stenosis, PFTs to eval lung function, TTE to eval EF / WMA / Valve function, and lab work including TSH, prealbumin, Hgb A1C, P2Y12, BNP, T&S, MRSA/MSSA, and UA.   BB and statin   No ASA per Dr. Baltazar   Case d/w Dr. Baltazar   Surgical plan to be determined, pending preop testing results.

## 2025-01-13 NOTE — DIETITIAN INITIAL EVALUATION ADULT - PERTINENT LABORATORY DATA
01-13    139  |  104  |  30.3[H]  ----------------------------<  136[H]  4.1   |  26.0  |  0.88    Ca    7.7[L]      13 Jan 2025 02:30  Mg     2.0     01-13    POCT Blood Glucose.: 141 mg/dL (01-13-25 @ 11:02)  A1C with Estimated Average Glucose Result: 6.6 % (01-07-25 @ 19:20)

## 2025-01-13 NOTE — DIETITIAN INITIAL EVALUATION ADULT - NS FNS DIET ORDER
Diet, Regular:   Consistent Carbohydrate {No Snacks} (CSTCHO)  DASH/TLC {Sodium & Cholesterol Restricted} (DASH) (01-13-25 @ 12:43)

## 2025-01-14 LAB
ANION GAP SERPL CALC-SCNC: 9 MMOL/L — SIGNIFICANT CHANGE UP (ref 5–17)
BUN SERPL-MCNC: 27.5 MG/DL — HIGH (ref 8–20)
CALCIUM SERPL-MCNC: 7.9 MG/DL — LOW (ref 8.4–10.5)
CHLORIDE SERPL-SCNC: 103 MMOL/L — SIGNIFICANT CHANGE UP (ref 96–108)
CO2 SERPL-SCNC: 28 MMOL/L — SIGNIFICANT CHANGE UP (ref 22–29)
CREAT SERPL-MCNC: 0.91 MG/DL — SIGNIFICANT CHANGE UP (ref 0.5–1.3)
EGFR: 87 ML/MIN/1.73M2 — SIGNIFICANT CHANGE UP
GLUCOSE BLDC GLUCOMTR-MCNC: 122 MG/DL — HIGH (ref 70–99)
GLUCOSE BLDC GLUCOMTR-MCNC: 124 MG/DL — HIGH (ref 70–99)
GLUCOSE BLDC GLUCOMTR-MCNC: 137 MG/DL — HIGH (ref 70–99)
GLUCOSE BLDC GLUCOMTR-MCNC: 138 MG/DL — HIGH (ref 70–99)
GLUCOSE SERPL-MCNC: 120 MG/DL — HIGH (ref 70–99)
HCT VFR BLD CALC: 27.8 % — LOW (ref 39–50)
HGB BLD-MCNC: 9.5 G/DL — LOW (ref 13–17)
MAGNESIUM SERPL-MCNC: 2.3 MG/DL — SIGNIFICANT CHANGE UP (ref 1.6–2.6)
MCHC RBC-ENTMCNC: 29.5 PG — SIGNIFICANT CHANGE UP (ref 27–34)
MCHC RBC-ENTMCNC: 34.2 G/DL — SIGNIFICANT CHANGE UP (ref 32–36)
MCV RBC AUTO: 86.3 FL — SIGNIFICANT CHANGE UP (ref 80–100)
PLATELET # BLD AUTO: 142 K/UL — LOW (ref 150–400)
POTASSIUM SERPL-MCNC: 3.8 MMOL/L — SIGNIFICANT CHANGE UP (ref 3.5–5.3)
POTASSIUM SERPL-SCNC: 3.8 MMOL/L — SIGNIFICANT CHANGE UP (ref 3.5–5.3)
RBC # BLD: 3.22 M/UL — LOW (ref 4.2–5.8)
RBC # FLD: 13.5 % — SIGNIFICANT CHANGE UP (ref 10.3–14.5)
SODIUM SERPL-SCNC: 139 MMOL/L — SIGNIFICANT CHANGE UP (ref 135–145)
WBC # BLD: 10.03 K/UL — SIGNIFICANT CHANGE UP (ref 3.8–10.5)
WBC # FLD AUTO: 10.03 K/UL — SIGNIFICANT CHANGE UP (ref 3.8–10.5)

## 2025-01-14 PROCEDURE — 71045 X-RAY EXAM CHEST 1 VIEW: CPT | Mod: 26,76

## 2025-01-14 PROCEDURE — 99232 SBSQ HOSP IP/OBS MODERATE 35: CPT

## 2025-01-14 PROCEDURE — 99024 POSTOP FOLLOW-UP VISIT: CPT

## 2025-01-14 RX ORDER — POLYSORBATE 80 100 MG/10ML
1 SOLUTION/ DROPS OPHTHALMIC DAILY
Refills: 0 | Status: DISCONTINUED | OUTPATIENT
Start: 2025-01-14 | End: 2025-01-15

## 2025-01-14 RX ORDER — BENZONATATE 100 MG
100 CAPSULE ORAL EVERY 8 HOURS
Refills: 0 | Status: DISCONTINUED | OUTPATIENT
Start: 2025-01-14 | End: 2025-01-15

## 2025-01-14 RX ADMIN — Medication 12.5 MILLIGRAM(S): at 17:19

## 2025-01-14 RX ADMIN — POLYSORBATE 80 1 DROP(S): 100 SOLUTION/ DROPS OPHTHALMIC at 12:12

## 2025-01-14 RX ADMIN — Medication 4 UNIT(S): at 17:24

## 2025-01-14 RX ADMIN — Medication 17 GRAM(S): at 08:51

## 2025-01-14 RX ADMIN — Medication 4 UNIT(S): at 08:52

## 2025-01-14 RX ADMIN — GABAPENTIN 100 MILLIGRAM(S): 300 CAPSULE ORAL at 13:01

## 2025-01-14 RX ADMIN — Medication 325 MILLIGRAM(S): at 08:50

## 2025-01-14 RX ADMIN — CHLORHEXIDINE GLUCONATE 1 APPLICATION(S): 1.2 RINSE ORAL at 09:00

## 2025-01-14 RX ADMIN — GABAPENTIN 100 MILLIGRAM(S): 300 CAPSULE ORAL at 21:23

## 2025-01-14 RX ADMIN — GABAPENTIN 100 MILLIGRAM(S): 300 CAPSULE ORAL at 05:30

## 2025-01-14 RX ADMIN — Medication 750 MILLIGRAM(S): at 21:23

## 2025-01-14 RX ADMIN — Medication 40 MILLIGRAM(S): at 05:30

## 2025-01-14 RX ADMIN — CHLORHEXIDINE GLUCONATE 15 MILLILITER(S): 1.2 RINSE ORAL at 05:30

## 2025-01-14 RX ADMIN — Medication 4 UNIT(S): at 13:01

## 2025-01-14 RX ADMIN — INSULIN GLARGINE-YFGN 12 UNIT(S): 100 INJECTION, SOLUTION SUBCUTANEOUS at 21:30

## 2025-01-14 RX ADMIN — POTASSIUM CHLORIDE 40 MILLIEQUIVALENT(S): 600 TABLET, FILM COATED, EXTENDED RELEASE ORAL at 17:24

## 2025-01-14 RX ADMIN — EZETIMIBE 10 MILLIGRAM(S): 10 TABLET ORAL at 08:51

## 2025-01-14 RX ADMIN — SENNOSIDES 2 TABLET(S): 8.6 TABLET, FILM COATED ORAL at 21:23

## 2025-01-14 RX ADMIN — ATORVASTATIN CALCIUM 80 MILLIGRAM(S): 40 TABLET, FILM COATED ORAL at 21:23

## 2025-01-14 RX ADMIN — CLOPIDOGREL BISULFATE 75 MILLIGRAM(S): 75 TABLET, FILM COATED ORAL at 08:51

## 2025-01-14 RX ADMIN — Medication 750 MILLIGRAM(S): at 05:30

## 2025-01-14 RX ADMIN — CHLORHEXIDINE GLUCONATE 15 MILLILITER(S): 1.2 RINSE ORAL at 17:17

## 2025-01-14 RX ADMIN — Medication 100 MILLIGRAM(S): at 18:40

## 2025-01-14 RX ADMIN — FLUTICASONE PROPIONATE 1 SPRAY(S): 50 SPRAY, METERED NASAL at 09:00

## 2025-01-14 RX ADMIN — Medication 500 MILLIGRAM(S): at 05:30

## 2025-01-14 RX ADMIN — PANTOPRAZOLE 40 MILLIGRAM(S): 40 TABLET, DELAYED RELEASE ORAL at 08:50

## 2025-01-14 RX ADMIN — Medication 10 MILLIGRAM(S): at 19:32

## 2025-01-14 RX ADMIN — Medication 750 MILLIGRAM(S): at 13:01

## 2025-01-14 RX ADMIN — Medication 500 MILLIGRAM(S): at 17:17

## 2025-01-14 RX ADMIN — CHLORHEXIDINE GLUCONATE 1 APPLICATION(S): 1.2 RINSE ORAL at 05:30

## 2025-01-14 RX ADMIN — ENOXAPARIN SODIUM 40 MILLIGRAM(S): 60 INJECTION INTRAVENOUS; SUBCUTANEOUS at 12:12

## 2025-01-14 RX ADMIN — Medication 12.5 MILLIGRAM(S): at 05:30

## 2025-01-14 RX ADMIN — FLUTICASONE PROPIONATE 1 SPRAY(S): 50 SPRAY, METERED NASAL at 21:24

## 2025-01-14 RX ADMIN — Medication 100 MILLIGRAM(S): at 11:24

## 2025-01-14 NOTE — PROGRESS NOTE ADULT - ASSESSMENT
74M w/ HTN, asymptomatic bradycardia, h/o frequent PVCs originally presented to [facility] for elective LHC and was found to have MVD and subsequently transferred to [facility], s/p CABG. Consult for new onset, a1c 6.6%.    1. Newly diagnosed diabetes, a1c 6.6%- glucoses stable  - Continue lantus 12 units qhs  - Continue admelog 4 units TID and correction scale  - RD consult for dietary education  - Can likely be discharged on oral medications  - Pt will follow up with PCP, he has our card and is welcome to call and make an appt if he wishes    2. CAD  - S/p 3v CABG, care per CT surgery    3. HLD  - Continue statin   74M w/ HTN, asymptomatic bradycardia, h/o frequent PVCs originally presented to [facility] for elective LHC and was found to have MVD and subsequently transferred to [facility], s/p CABG. Consult for new onset, a1c 6.6%.    1. Newly diagnosed diabetes, a1c 6.6%- glucoses stable  - Continue lantus 12 units qhs  - Continue admelog 4 units TID and correction scale  - Discharge recommendations: Synjardy 5mg/500mg BID  - Pt will follow up with PCP, he has our card and is welcome to call and make an appt if he wishes    2. CAD  - S/p 3v CABG, care per CT surgery    3. HLD  - Continue statin   74M w/ HTN, asymptomatic bradycardia, h/o frequent PVCs originally presented [place] [facility] [place] and was found to have MVD and subsequently transferred to [facility], s/p CABG. Consult for new onset, a1c 6.6%.    1. Newly diagnosed diabetes, a1c 6.6%- glucoses stable  - Continue lantus 12 units qhs  - Continue admelog 4 units TID and correction scale  - Discharge recommendations: Synjardy 5mg/500mg BID  - Diabetes education  - Pt will follow up with PCP, he has our card and is welcome to call and make an appt if he wishes  - Will sign off, please call with any questions    2. CAD  - S/p 3v CABG, care per CT surgery    3. HLD  - Continue statin

## 2025-01-14 NOTE — PROGRESS NOTE ADULT - SUBJECTIVE AND OBJECTIVE BOX
INTERVAL HPI/OVERNIGHT EVENTS:    MEDICATIONS  (STANDING):  artificial  tears Solution 1 Drop(s) Both EYES daily  ascorbic acid 500 milliGRAM(s) Oral two times a day  aspirin enteric coated 325 milliGRAM(s) Oral daily  atorvastatin 80 milliGRAM(s) Oral at bedtime  bisacodyl Suppository 10 milliGRAM(s) Rectal once  chlorhexidine 0.12% Liquid 15 milliLiter(s) Oral Mucosa every 12 hours  chlorhexidine 2% Cloths 1 Application(s) Topical daily  chlorhexidine 4% Liquid 1 Application(s) Topical <User Schedule>  clopidogrel Tablet 75 milliGRAM(s) Oral daily  dextrose 5%. 1000 milliLiter(s) (50 mL/Hr) IV Continuous <Continuous>  dextrose 5%. 1000 milliLiter(s) (100 mL/Hr) IV Continuous <Continuous>  dextrose 50% Injectable 50 milliLiter(s) IV Push every 15 minutes  dextrose 50% Injectable 25 milliLiter(s) IV Push every 15 minutes  enoxaparin Injectable 40 milliGRAM(s) SubCutaneous every 24 hours  ezetimibe 10 milliGRAM(s) Oral daily  fluticasone propionate 50 MICROgram(s)/spray Nasal Spray 1 Spray(s) Both Nostrils two times a day  furosemide    Tablet 40 milliGRAM(s) Oral daily  gabapentin 100 milliGRAM(s) Oral every 8 hours  glucagon  Injectable 1 milliGRAM(s) IntraMuscular once  insulin glargine Injectable (LANTUS) 12 Unit(s) SubCutaneous at bedtime  insulin lispro (ADMELOG) corrective regimen sliding scale   SubCutaneous at bedtime  insulin lispro (ADMELOG) corrective regimen sliding scale   SubCutaneous three times a day before meals  insulin lispro Injectable (ADMELOG) 4 Unit(s) SubCutaneous three times a day before meals  methocarbamol 750 milliGRAM(s) Oral three times a day  metoprolol tartrate 12.5 milliGRAM(s) Oral two times a day  pantoprazole    Tablet 40 milliGRAM(s) Oral daily  polyethylene glycol 3350 17 Gram(s) Oral daily  potassium chloride   Powder 40 milliEquivalent(s) Oral once  senna 2 Tablet(s) Oral at bedtime  sodium chloride 0.9%. 1000 milliLiter(s) (10 mL/Hr) IV Continuous <Continuous>  sodium chloride 0.9%. 1000 milliLiter(s) (5 mL/Hr) IV Continuous <Continuous>    MEDICATIONS  (PRN):  acetaminophen     Tablet .. 650 milliGRAM(s) Oral every 6 hours PRN Mild Pain (1 - 3)  benzonatate 100 milliGRAM(s) Oral every 8 hours PRN Cough  dextrose Oral Gel 15 Gram(s) Oral once PRN Blood Glucose LESS THAN 70 milliGRAM(s)/deciliter  oxyCODONE    IR 5 milliGRAM(s) Oral every 4 hours PRN Moderate Pain (4 - 6)  oxyCODONE    IR 10 milliGRAM(s) Oral every 4 hours PRN Severe Pain (7 - 10)  sodium chloride 0.9% lock flush 10 milliLiter(s) IV Push every 1 hour PRN Pre/post blood products, medications, blood draw, and to maintain line patency      Allergies    No Known Allergies    Intolerances        Review of systems:    Vital Signs Last 24 Hrs  T(C): 37.2 (14 Jan 2025 09:45), Max: 37.2 (13 Jan 2025 12:00)  T(F): 99 (14 Jan 2025 09:45), Max: 99 (14 Jan 2025 08:22)  HR: 63 (14 Jan 2025 09:45) (63 - 85)  BP: 162/88 (14 Jan 2025 09:45) (100/60 - 184/90)  BP(mean): 83 (13 Jan 2025 16:00) (70 - 92)  RR: 17 (14 Jan 2025 09:45) (16 - 22)  SpO2: 94% (14 Jan 2025 09:45) (92% - 98%)    Parameters below as of 14 Jan 2025 09:45  Patient On (Oxygen Delivery Method): room air    PHYSICAL EXAM:  General: No apparent distress  Neck: Supple, trachea midline, no thyromegaly  Respiratory: Lungs clear bilaterally, normal rate, effort  Cardiac: +S1, S2, no m/r/g  GI: +BS, soft, non tender, non distended  Extremities: No peripheral edema, no pedal lesions  Neuro: A+O X3    LABS:                        9.5    10.03 )-----------( 142      ( 14 Jan 2025 04:31 )             27.8     01-14    139  |  103  |  27.5[H]  ----------------------------<  120[H]  3.8   |  28.0  |  0.91    Ca    7.9[L]      14 Jan 2025 04:31  Mg     2.3     01-14      Urinalysis Basic - ( 14 Jan 2025 04:31 )    Color: x / Appearance: x / SG: x / pH: x  Gluc: 120 mg/dL / Ketone: x  / Bili: x / Urobili: x   Blood: x / Protein: x / Nitrite: x   Leuk Esterase: x / RBC: x / WBC x   Sq Epi: x / Non Sq Epi: x / Bacteria: x    POCT Blood Glucose.: 122 mg/dL (01-14-25 @ 08:35)  POCT Blood Glucose.: 129 mg/dL (01-13-25 @ 21:55)  POCT Blood Glucose.: 117 mg/dL (01-13-25 @ 16:39)  POCT Blood Glucose.: 141 mg/dL (01-13-25 @ 11:02)    Triiodothyronine, Total (T3 Total): 134 ng/dL (01-07-25 @ 19:20)  Free Thyroxine, Serum: 0.9 ng/dL (01-07-25 @ 19:20)  Thyroid Stimulating Hormone, Serum: 3.55 uIU/mL (01-07-25 @ 19:20)   INTERVAL HPI/OVERNIGHT EVENTS:  Follow up diabetes management, glucoses stable.    MEDICATIONS  (STANDING):  artificial  tears Solution 1 Drop(s) Both EYES daily  ascorbic acid 500 milliGRAM(s) Oral two times a day  aspirin enteric coated 325 milliGRAM(s) Oral daily  atorvastatin 80 milliGRAM(s) Oral at bedtime  bisacodyl Suppository 10 milliGRAM(s) Rectal once  chlorhexidine 0.12% Liquid 15 milliLiter(s) Oral Mucosa every 12 hours  chlorhexidine 2% Cloths 1 Application(s) Topical daily  chlorhexidine 4% Liquid 1 Application(s) Topical <User Schedule>  clopidogrel Tablet 75 milliGRAM(s) Oral daily  dextrose 5%. 1000 milliLiter(s) (50 mL/Hr) IV Continuous <Continuous>  dextrose 5%. 1000 milliLiter(s) (100 mL/Hr) IV Continuous <Continuous>  dextrose 50% Injectable 50 milliLiter(s) IV Push every 15 minutes  dextrose 50% Injectable 25 milliLiter(s) IV Push every 15 minutes  enoxaparin Injectable 40 milliGRAM(s) SubCutaneous every 24 hours  ezetimibe 10 milliGRAM(s) Oral daily  fluticasone propionate 50 MICROgram(s)/spray Nasal Spray 1 Spray(s) Both Nostrils two times a day  furosemide    Tablet 40 milliGRAM(s) Oral daily  gabapentin 100 milliGRAM(s) Oral every 8 hours  glucagon  Injectable 1 milliGRAM(s) IntraMuscular once  insulin glargine Injectable (LANTUS) 12 Unit(s) SubCutaneous at bedtime  insulin lispro (ADMELOG) corrective regimen sliding scale   SubCutaneous at bedtime  insulin lispro (ADMELOG) corrective regimen sliding scale   SubCutaneous three times a day before meals  insulin lispro Injectable (ADMELOG) 4 Unit(s) SubCutaneous three times a day before meals  methocarbamol 750 milliGRAM(s) Oral three times a day  metoprolol tartrate 12.5 milliGRAM(s) Oral two times a day  pantoprazole    Tablet 40 milliGRAM(s) Oral daily  polyethylene glycol 3350 17 Gram(s) Oral daily  potassium chloride   Powder 40 milliEquivalent(s) Oral once  senna 2 Tablet(s) Oral at bedtime  sodium chloride 0.9%. 1000 milliLiter(s) (10 mL/Hr) IV Continuous <Continuous>  sodium chloride 0.9%. 1000 milliLiter(s) (5 mL/Hr) IV Continuous <Continuous>    MEDICATIONS  (PRN):  acetaminophen     Tablet .. 650 milliGRAM(s) Oral every 6 hours PRN Mild Pain (1 - 3)  benzonatate 100 milliGRAM(s) Oral every 8 hours PRN Cough  dextrose Oral Gel 15 Gram(s) Oral once PRN Blood Glucose LESS THAN 70 milliGRAM(s)/deciliter  oxyCODONE    IR 5 milliGRAM(s) Oral every 4 hours PRN Moderate Pain (4 - 6)  oxyCODONE    IR 10 milliGRAM(s) Oral every 4 hours PRN Severe Pain (7 - 10)  sodium chloride 0.9% lock flush 10 milliLiter(s) IV Push every 1 hour PRN Pre/post blood products, medications, blood draw, and to maintain line patency    Allergies  No Known Allergies    Vital Signs Last 24 Hrs  T(C): 37.2 (14 Jan 2025 09:45), Max: 37.2 (13 Jan 2025 12:00)  T(F): 99 (14 Jan 2025 09:45), Max: 99 (14 Jan 2025 08:22)  HR: 63 (14 Jan 2025 09:45) (63 - 85)  BP: 162/88 (14 Jan 2025 09:45) (100/60 - 184/90)  BP(mean): 83 (13 Jan 2025 16:00) (70 - 92)  RR: 17 (14 Jan 2025 09:45) (16 - 22)  SpO2: 94% (14 Jan 2025 09:45) (92% - 98%)    Parameters below as of 14 Jan 2025 09:45  Patient On (Oxygen Delivery Method): room air    PHYSICAL EXAM:  General: No apparent distress  Neck: Supple, trachea midline, no thyromegaly  Respiratory: Lungs clear bilaterally, normal rate, effort  Cardiac: +S1, S2, no m/r/g  GI: +BS, soft, non tender, non distended  Extremities: No peripheral edema, no pedal lesions  Neuro: A+O X3    LABS:                        9.5    10.03 )-----------( 142      ( 14 Jan 2025 04:31 )             27.8     01-14    139  |  103  |  27.5[H]  ----------------------------<  120[H]  3.8   |  28.0  |  0.91    Ca    7.9[L]      14 Jan 2025 04:31  Mg     2.3     01-14      Urinalysis Basic - ( 14 Jan 2025 04:31 )    Color: x / Appearance: x / SG: x / pH: x  Gluc: 120 mg/dL / Ketone: x  / Bili: x / Urobili: x   Blood: x / Protein: x / Nitrite: x   Leuk Esterase: x / RBC: x / WBC x   Sq Epi: x / Non Sq Epi: x / Bacteria: x    POCT Blood Glucose.: 122 mg/dL (01-14-25 @ 08:35)  POCT Blood Glucose.: 129 mg/dL (01-13-25 @ 21:55)  POCT Blood Glucose.: 117 mg/dL (01-13-25 @ 16:39)  POCT Blood Glucose.: 141 mg/dL (01-13-25 @ 11:02)    Triiodothyronine, Total (T3 Total): 134 ng/dL (01-07-25 @ 19:20)  Free Thyroxine, Serum: 0.9 ng/dL (01-07-25 @ 19:20)  Thyroid Stimulating Hormone, Serum: 3.55 uIU/mL (01-07-25 @ 19:20)

## 2025-01-14 NOTE — PROGRESS NOTE ADULT - SUBJECTIVE AND OBJECTIVE BOX
Subjective:  Patient seen and examined, POD 4 CABG X3. Patient lying in bed in NAD. +Tolerating diet. +Passing BMs since surgery. +Pain currently controlled. Denies fevers, chills, lightheadedness, dizziness, HA, CP, palpitations, SOB, cough, abdominal pain, N/V, diarrhea, numbness/tingling in extremities, or any other acute complaints. ROS negative x 10 systems except as noted above     PAST MEDICAL & SURGICAL HISTORY:  Hypertension    Bigeminy    Exostosis  left ear canal    BPH (benign prostatic hypertrophy)    Hard of hearing  Exostosis left ear  in May 2015    Lumbar disc disease    Hemorrhoid  2012  Hemorrhoidectomy    Lumbar disc disease  lumbar disc surgery 15 years ago    S/P cholecystectomy    VITAL SIGNS  Vital Signs Last 24 Hrs  T(C): 36.5 (25 @ 00:20), Max: 37.2 (25 @ 12:00)  T(F): 97.7 (25 @ 00:20), Max: 98.9 (25 @ 12:00)  HR: 77 (25 @ 00:20) (55 - 77)  BP: 101/57 (25 @ 00:20) (101/57 - 166/87)  RR: 18 (25 @ 00:20) (11 - 22)  SpO2: 93% (25 @ 00:20) (91% - 96%)  on (O2)              MEDICATIONS  acetaminophen     Tablet .. 650 milliGRAM(s) Oral every 6 hours PRN  ascorbic acid 500 milliGRAM(s) Oral two times a day  aspirin enteric coated 325 milliGRAM(s) Oral daily  atorvastatin 80 milliGRAM(s) Oral at bedtime  bisacodyl Suppository 10 milliGRAM(s) Rectal once  chlorhexidine 0.12% Liquid 15 milliLiter(s) Oral Mucosa every 12 hours  chlorhexidine 2% Cloths 1 Application(s) Topical daily  chlorhexidine 4% Liquid 1 Application(s) Topical <User Schedule>  clopidogrel Tablet 75 milliGRAM(s) Oral daily  dextrose 5%. 1000 milliLiter(s) IV Continuous <Continuous>  dextrose 5%. 1000 milliLiter(s) IV Continuous <Continuous>  dextrose 50% Injectable 50 milliLiter(s) IV Push every 15 minutes  dextrose 50% Injectable 25 milliLiter(s) IV Push every 15 minutes  dextrose Oral Gel 15 Gram(s) Oral once PRN  enoxaparin Injectable 40 milliGRAM(s) SubCutaneous every 24 hours  ezetimibe 10 milliGRAM(s) Oral daily  fluticasone propionate 50 MICROgram(s)/spray Nasal Spray 1 Spray(s) Both Nostrils two times a day  furosemide    Tablet 40 milliGRAM(s) Oral daily  gabapentin 100 milliGRAM(s) Oral every 8 hours  glucagon  Injectable 1 milliGRAM(s) IntraMuscular once  insulin glargine Injectable (LANTUS) 12 Unit(s) SubCutaneous at bedtime  insulin lispro (ADMELOG) corrective regimen sliding scale   SubCutaneous at bedtime  insulin lispro (ADMELOG) corrective regimen sliding scale   SubCutaneous three times a day before meals  insulin lispro Injectable (ADMELOG) 4 Unit(s) SubCutaneous three times a day before meals  methocarbamol 750 milliGRAM(s) Oral three times a day  metoprolol tartrate 12.5 milliGRAM(s) Oral two times a day  oxyCODONE    IR 5 milliGRAM(s) Oral every 4 hours PRN  oxyCODONE    IR 10 milliGRAM(s) Oral every 4 hours PRN  pantoprazole    Tablet 40 milliGRAM(s) Oral daily  polyethylene glycol 3350 17 Gram(s) Oral daily  potassium chloride   Powder 40 milliEquivalent(s) Oral once  senna 2 Tablet(s) Oral at bedtime  sodium chloride 0.9% lock flush 10 milliLiter(s) IV Push every 1 hour PRN  sodium chloride 0.9%. 1000 milliLiter(s) IV Continuous <Continuous>  sodium chloride 0.9%. 1000 milliLiter(s) IV Continuous <Continuous>       @ : @ 07:00  --------------------------------------------------------  IN: 1490 mL / OUT: 560 mL / NET: 930 mL     @ 07:01  14 @ 04:01  --------------------------------------------------------  IN: 827 mL / OUT: 1385 mL / NET: -558 mL      Weights:  Daily     Daily Weight in k.5 (2025 04:54)  Admit Wt: Drug Dosing Weight  Height (cm): 180.3 (2025 18:21)  Weight (kg): 99.8 (2025 18:21)  BMI (kg/m2): 30.7 (2025 18:21)  BSA (m2): 2.2 (2025 18:21)    LABS      139  |  104  |  30.3[H]  ----------------------------<  136[H]  4.1   |  26.0  |  0.88    Ca    7.7[L]      2025 02:30  Mg     2.0                                     10.2   10.17 )-----------( 131      ( 2025 16:40 )             30.0          CAPILLARY BLOOD GLUCOSE  POCT Blood Glucose.: 129 mg/dL (2025 21:55)  POCT Blood Glucose.: 117 mg/dL (2025 16:39)  POCT Blood Glucose.: 141 mg/dL (2025 11:02)  POCT Blood Glucose.: 128 mg/dL (2025 07:41)           DIAGNOSTICS:  All laboratory results, radiology and medications reviewed.    < from: TTE W or WO Ultrasound Enhancing Agent (25 @ 08:35) >  _______________________________________________________________________________________     CONCLUSIONS:      1. Limited echo s/p CABGto evaluate for effusion.   2. Left ventricular cavity is normal in size. Left ventricular systolic function is normal with an ejection fraction visually estimated at 55 to 60 %.   3. No pericardial effusion seen.   4. Moderate left pleural effusion noted.      < end of copied text >      PHYSICAL EXAM  General: NAD  Neurology: Awake, nonfocal, CLARK x 4  Eyes: Scleras clear, PERRLA/ EOMI, Gross vision intact  Neck: Neck supple, trachea midline, No JVD  Respiratory: CTA B/L, No wheezing, rales, rhonchi  CV: RRR, S1S2, no murmurs, rubs or gallops  Abdominal: Soft, NT, ND +BS  Extremities: No edema, + peripheral pulses  Incisions: midsternal mepilex C/D/I, sternum stable, left LE vein harvest site with c/d/i w/ dressing  Chest tubes: Rt pleural CT & mediastinal CT in place

## 2025-01-14 NOTE — PROGRESS NOTE ADULT - ASSESSMENT
73y/o male with PMHx HTN, asymptomatic bradycardia, h/o frequent PVCs presented to [facility] for elective LHC. Pt reports having outpatient CT calcium scan which was elevated. Pt was referred to [facility] for LHC today, revealing TVD - pLAD 90%, oCx 75%, and %. EF normal at 60%. Pt transferred to [facility] for CABG work up. Now s/p CABG x3 with Dr. Baltazar. Post op course noteable for ABLA,s/p 1PRBC.

## 2025-01-14 NOTE — PROGRESS NOTE ADULT - NS ATTEND AMEND GEN_ALL_CORE FT
I agree with plan above and discussed with NP.  Continue lantus 12 units qhs. Continue admelog 4 units TID. Correction scale
I agree with plan above and discussed with NP.  - Continue lantus 12 units qhs  - Continue admelog 4 units TID and correction scale

## 2025-01-15 ENCOUNTER — TRANSCRIPTION ENCOUNTER (OUTPATIENT)
Age: 75
End: 2025-01-15

## 2025-01-15 ENCOUNTER — APPOINTMENT (OUTPATIENT)
Dept: CARDIOLOGY | Facility: CLINIC | Age: 75
End: 2025-01-15

## 2025-01-15 VITALS
HEART RATE: 76 BPM | DIASTOLIC BLOOD PRESSURE: 75 MMHG | OXYGEN SATURATION: 95 % | SYSTOLIC BLOOD PRESSURE: 145 MMHG | RESPIRATION RATE: 18 BRPM | TEMPERATURE: 99 F

## 2025-01-15 LAB
ANION GAP SERPL CALC-SCNC: 11 MMOL/L — SIGNIFICANT CHANGE UP (ref 5–17)
BUN SERPL-MCNC: 26.9 MG/DL — HIGH (ref 8–20)
CALCIUM SERPL-MCNC: 8.1 MG/DL — LOW (ref 8.4–10.5)
CHLORIDE SERPL-SCNC: 101 MMOL/L — SIGNIFICANT CHANGE UP (ref 96–108)
CO2 SERPL-SCNC: 26 MMOL/L — SIGNIFICANT CHANGE UP (ref 22–29)
CREAT SERPL-MCNC: 1.02 MG/DL — SIGNIFICANT CHANGE UP (ref 0.5–1.3)
EGFR: 76 ML/MIN/1.73M2 — SIGNIFICANT CHANGE UP
GLUCOSE BLDC GLUCOMTR-MCNC: 103 MG/DL — HIGH (ref 70–99)
GLUCOSE BLDC GLUCOMTR-MCNC: 109 MG/DL — HIGH (ref 70–99)
GLUCOSE BLDC GLUCOMTR-MCNC: 121 MG/DL — HIGH (ref 70–99)
GLUCOSE SERPL-MCNC: 121 MG/DL — HIGH (ref 70–99)
HCT VFR BLD CALC: 28.8 % — LOW (ref 39–50)
HGB BLD-MCNC: 9.6 G/DL — LOW (ref 13–17)
MAGNESIUM SERPL-MCNC: 1.9 MG/DL — SIGNIFICANT CHANGE UP (ref 1.6–2.6)
MCHC RBC-ENTMCNC: 28.9 PG — SIGNIFICANT CHANGE UP (ref 27–34)
MCHC RBC-ENTMCNC: 33.3 G/DL — SIGNIFICANT CHANGE UP (ref 32–36)
MCV RBC AUTO: 86.7 FL — SIGNIFICANT CHANGE UP (ref 80–100)
PLATELET # BLD AUTO: 170 K/UL — SIGNIFICANT CHANGE UP (ref 150–400)
POTASSIUM SERPL-MCNC: 3.7 MMOL/L — SIGNIFICANT CHANGE UP (ref 3.5–5.3)
POTASSIUM SERPL-SCNC: 3.7 MMOL/L — SIGNIFICANT CHANGE UP (ref 3.5–5.3)
RBC # BLD: 3.32 M/UL — LOW (ref 4.2–5.8)
RBC # FLD: 13.6 % — SIGNIFICANT CHANGE UP (ref 10.3–14.5)
SODIUM SERPL-SCNC: 138 MMOL/L — SIGNIFICANT CHANGE UP (ref 135–145)
WBC # BLD: 7.67 K/UL — SIGNIFICANT CHANGE UP (ref 3.8–10.5)
WBC # FLD AUTO: 7.67 K/UL — SIGNIFICANT CHANGE UP (ref 3.8–10.5)

## 2025-01-15 PROCEDURE — 94002 VENT MGMT INPAT INIT DAY: CPT

## 2025-01-15 PROCEDURE — 85384 FIBRINOGEN ACTIVITY: CPT

## 2025-01-15 PROCEDURE — 86901 BLOOD TYPING SEROLOGIC RH(D): CPT

## 2025-01-15 PROCEDURE — 36415 COLL VENOUS BLD VENIPUNCTURE: CPT

## 2025-01-15 PROCEDURE — P9045: CPT

## 2025-01-15 PROCEDURE — 83605 ASSAY OF LACTIC ACID: CPT

## 2025-01-15 PROCEDURE — 84480 ASSAY TRIIODOTHYRONINE (T3): CPT

## 2025-01-15 PROCEDURE — 83615 LACTATE (LD) (LDH) ENZYME: CPT

## 2025-01-15 PROCEDURE — 94010 BREATHING CAPACITY TEST: CPT

## 2025-01-15 PROCEDURE — 82803 BLOOD GASES ANY COMBINATION: CPT

## 2025-01-15 PROCEDURE — 83735 ASSAY OF MAGNESIUM: CPT

## 2025-01-15 PROCEDURE — 82330 ASSAY OF CALCIUM: CPT

## 2025-01-15 PROCEDURE — 85018 HEMOGLOBIN: CPT

## 2025-01-15 PROCEDURE — 87641 MR-STAPH DNA AMP PROBE: CPT

## 2025-01-15 PROCEDURE — 82550 ASSAY OF CK (CPK): CPT

## 2025-01-15 PROCEDURE — 82553 CREATINE MB FRACTION: CPT

## 2025-01-15 PROCEDURE — C1769: CPT

## 2025-01-15 PROCEDURE — 84132 ASSAY OF SERUM POTASSIUM: CPT

## 2025-01-15 PROCEDURE — 82435 ASSAY OF BLOOD CHLORIDE: CPT

## 2025-01-15 PROCEDURE — 85576 BLOOD PLATELET AGGREGATION: CPT

## 2025-01-15 PROCEDURE — 83880 ASSAY OF NATRIURETIC PEPTIDE: CPT

## 2025-01-15 PROCEDURE — 84134 ASSAY OF PREALBUMIN: CPT

## 2025-01-15 PROCEDURE — P9016: CPT

## 2025-01-15 PROCEDURE — 86850 RBC ANTIBODY SCREEN: CPT

## 2025-01-15 PROCEDURE — 81003 URINALYSIS AUTO W/O SCOPE: CPT

## 2025-01-15 PROCEDURE — 94640 AIRWAY INHALATION TREATMENT: CPT

## 2025-01-15 PROCEDURE — 84295 ASSAY OF SERUM SODIUM: CPT

## 2025-01-15 PROCEDURE — 80061 LIPID PANEL: CPT

## 2025-01-15 PROCEDURE — 97163 PT EVAL HIGH COMPLEX 45 MIN: CPT

## 2025-01-15 PROCEDURE — 82962 GLUCOSE BLOOD TEST: CPT

## 2025-01-15 PROCEDURE — 82947 ASSAY GLUCOSE BLOOD QUANT: CPT

## 2025-01-15 PROCEDURE — 87640 STAPH A DNA AMP PROBE: CPT

## 2025-01-15 PROCEDURE — 93971 EXTREMITY STUDY: CPT

## 2025-01-15 PROCEDURE — 71045 X-RAY EXAM CHEST 1 VIEW: CPT | Mod: 26

## 2025-01-15 PROCEDURE — C1889: CPT

## 2025-01-15 PROCEDURE — 93306 TTE W/DOPPLER COMPLETE: CPT

## 2025-01-15 PROCEDURE — 84484 ASSAY OF TROPONIN QUANT: CPT

## 2025-01-15 PROCEDURE — 83036 HEMOGLOBIN GLYCOSYLATED A1C: CPT

## 2025-01-15 PROCEDURE — 71045 X-RAY EXAM CHEST 1 VIEW: CPT

## 2025-01-15 PROCEDURE — 85025 COMPLETE CBC W/AUTO DIFF WBC: CPT

## 2025-01-15 PROCEDURE — 93880 EXTRACRANIAL BILAT STUDY: CPT

## 2025-01-15 PROCEDURE — 80053 COMPREHEN METABOLIC PANEL: CPT

## 2025-01-15 PROCEDURE — 85730 THROMBOPLASTIN TIME PARTIAL: CPT

## 2025-01-15 PROCEDURE — 80048 BASIC METABOLIC PNL TOTAL CA: CPT

## 2025-01-15 PROCEDURE — 36430 TRANSFUSION BLD/BLD COMPNT: CPT

## 2025-01-15 PROCEDURE — 84436 ASSAY OF TOTAL THYROXINE: CPT

## 2025-01-15 PROCEDURE — 86891 AUTOLOGOUS BLOOD OP SALVAGE: CPT

## 2025-01-15 PROCEDURE — 99024 POSTOP FOLLOW-UP VISIT: CPT

## 2025-01-15 PROCEDURE — 86900 BLOOD TYPING SEROLOGIC ABO: CPT

## 2025-01-15 PROCEDURE — 86923 COMPATIBILITY TEST ELECTRIC: CPT

## 2025-01-15 PROCEDURE — C1751: CPT

## 2025-01-15 PROCEDURE — 85014 HEMATOCRIT: CPT

## 2025-01-15 PROCEDURE — C8929: CPT

## 2025-01-15 PROCEDURE — 94760 N-INVAS EAR/PLS OXIMETRY 1: CPT

## 2025-01-15 PROCEDURE — 84439 ASSAY OF FREE THYROXINE: CPT

## 2025-01-15 PROCEDURE — 85027 COMPLETE CBC AUTOMATED: CPT

## 2025-01-15 PROCEDURE — 93005 ELECTROCARDIOGRAM TRACING: CPT

## 2025-01-15 PROCEDURE — 84443 ASSAY THYROID STIM HORMONE: CPT

## 2025-01-15 PROCEDURE — 85610 PROTHROMBIN TIME: CPT

## 2025-01-15 PROCEDURE — 73700 CT LOWER EXTREMITY W/O DYE: CPT | Mod: MC

## 2025-01-15 RX ORDER — ASPIRIN 81 MG
1 TABLET, DELAYED RELEASE (ENTERIC COATED) ORAL
Refills: 0 | DISCHARGE

## 2025-01-15 RX ORDER — METOPROLOL TARTRATE 50 MG
25 TABLET ORAL
Refills: 0 | Status: DISCONTINUED | OUTPATIENT
Start: 2025-01-15 | End: 2025-01-15

## 2025-01-15 RX ORDER — BENZONATATE 100 MG
1 CAPSULE ORAL
Qty: 42 | Refills: 0
Start: 2025-01-15 | End: 2025-01-28

## 2025-01-15 RX ORDER — AMIODARONE HYDROCHLORIDE 200 MG/1
400 TABLET ORAL EVERY 8 HOURS
Refills: 0 | Status: DISCONTINUED | OUTPATIENT
Start: 2025-01-15 | End: 2025-01-15

## 2025-01-15 RX ORDER — AMIODARONE HYDROCHLORIDE 200 MG/1
200 TABLET ORAL DAILY
Refills: 0 | Status: CANCELLED | OUTPATIENT
Start: 2025-01-19 | End: 2025-01-15

## 2025-01-15 RX ORDER — HYDROCHLOROTHIAZIDE 25 MG/1
1 TABLET ORAL
Refills: 0 | DISCHARGE

## 2025-01-15 RX ORDER — CLOPIDOGREL BISULFATE 75 MG/1
1 TABLET, FILM COATED ORAL
Qty: 30 | Refills: 1
Start: 2025-01-15 | End: 2025-03-15

## 2025-01-15 RX ORDER — FUROSEMIDE 20 MG
1 TABLET ORAL
Qty: 14 | Refills: 0
Start: 2025-01-15 | End: 2025-01-28

## 2025-01-15 RX ORDER — METOPROLOL TARTRATE 50 MG
5 TABLET ORAL ONCE
Refills: 0 | Status: COMPLETED | OUTPATIENT
Start: 2025-01-15 | End: 2025-01-15

## 2025-01-15 RX ORDER — ASPIRIN 81 MG
1 TABLET, DELAYED RELEASE (ENTERIC COATED) ORAL
Qty: 30 | Refills: 1
Start: 2025-01-15 | End: 2025-03-15

## 2025-01-15 RX ORDER — METOPROLOL TARTRATE 50 MG
1 TABLET ORAL
Qty: 60 | Refills: 1
Start: 2025-01-15 | End: 2025-03-15

## 2025-01-15 RX ORDER — ATORVASTATIN CALCIUM 40 MG/1
1 TABLET, FILM COATED ORAL
Qty: 30 | Refills: 1
Start: 2025-01-15 | End: 2025-03-15

## 2025-01-15 RX ORDER — AMIODARONE HYDROCHLORIDE 200 MG/1
TABLET ORAL
Refills: 0 | Status: DISCONTINUED | OUTPATIENT
Start: 2025-01-15 | End: 2025-01-15

## 2025-01-15 RX ORDER — METOPROLOL TARTRATE 50 MG
1 TABLET ORAL
Refills: 0 | DISCHARGE

## 2025-01-15 RX ORDER — OXYCODONE HCL 15 MG
1 TABLET ORAL
Qty: 20 | Refills: 0
Start: 2025-01-15 | End: 2025-01-19

## 2025-01-15 RX ORDER — AZILSARTAN KAMEDOXOMIL 80 MG/1
1 TABLET ORAL
Refills: 0 | DISCHARGE

## 2025-01-15 RX ORDER — EMPAGLIFLOZIN, METFORMIN HYDROCHLORIDE 5; 1000 MG/1; MG/1
1 TABLET, EXTENDED RELEASE ORAL
Qty: 60 | Refills: 1
Start: 2025-01-15 | End: 2025-03-15

## 2025-01-15 RX ORDER — MAGNESIUM SULFATE 500 MG/ML
2 INJECTION, SOLUTION INTRAMUSCULAR; INTRAVENOUS ONCE
Refills: 0 | Status: COMPLETED | OUTPATIENT
Start: 2025-01-15 | End: 2025-01-15

## 2025-01-15 RX ORDER — POTASSIUM CHLORIDE 600 MG/1
2 TABLET, FILM COATED, EXTENDED RELEASE ORAL
Qty: 28 | Refills: 0
Start: 2025-01-15 | End: 2025-01-28

## 2025-01-15 RX ORDER — AMIODARONE HYDROCHLORIDE 200 MG/1
1 TABLET ORAL
Qty: 30 | Refills: 1
Start: 2025-01-15 | End: 2025-03-15

## 2025-01-15 RX ORDER — ATORVASTATIN CALCIUM 40 MG/1
1 TABLET, FILM COATED ORAL
Refills: 0 | DISCHARGE

## 2025-01-15 RX ORDER — SENNOSIDES 8.6 MG/1
2 TABLET, FILM COATED ORAL
Qty: 10 | Refills: 0
Start: 2025-01-15 | End: 2025-01-19

## 2025-01-15 RX ORDER — POTASSIUM CHLORIDE 600 MG/1
40 TABLET, FILM COATED, EXTENDED RELEASE ORAL ONCE
Refills: 0 | Status: COMPLETED | OUTPATIENT
Start: 2025-01-15 | End: 2025-01-15

## 2025-01-15 RX ADMIN — Medication 25 MILLIGRAM(S): at 06:07

## 2025-01-15 RX ADMIN — Medication 500 MILLIGRAM(S): at 06:06

## 2025-01-15 RX ADMIN — CHLORHEXIDINE GLUCONATE 1 APPLICATION(S): 1.2 RINSE ORAL at 06:10

## 2025-01-15 RX ADMIN — Medication 325 MILLIGRAM(S): at 08:07

## 2025-01-15 RX ADMIN — EZETIMIBE 10 MILLIGRAM(S): 10 TABLET ORAL at 08:06

## 2025-01-15 RX ADMIN — CHLORHEXIDINE GLUCONATE 15 MILLILITER(S): 1.2 RINSE ORAL at 06:06

## 2025-01-15 RX ADMIN — MAGNESIUM SULFATE 25 GRAM(S): 500 INJECTION, SOLUTION INTRAMUSCULAR; INTRAVENOUS at 08:06

## 2025-01-15 RX ADMIN — Medication 40 MILLIGRAM(S): at 06:07

## 2025-01-15 RX ADMIN — POTASSIUM CHLORIDE 40 MILLIEQUIVALENT(S): 600 TABLET, FILM COATED, EXTENDED RELEASE ORAL at 08:07

## 2025-01-15 RX ADMIN — Medication 100 MILLIGRAM(S): at 06:07

## 2025-01-15 RX ADMIN — AMIODARONE HYDROCHLORIDE 400 MILLIGRAM(S): 200 TABLET ORAL at 12:30

## 2025-01-15 RX ADMIN — Medication 4 UNIT(S): at 08:18

## 2025-01-15 RX ADMIN — Medication 750 MILLIGRAM(S): at 06:07

## 2025-01-15 RX ADMIN — Medication 750 MILLIGRAM(S): at 12:58

## 2025-01-15 RX ADMIN — PANTOPRAZOLE 40 MILLIGRAM(S): 40 TABLET, DELAYED RELEASE ORAL at 08:07

## 2025-01-15 RX ADMIN — POLYSORBATE 80 1 DROP(S): 100 SOLUTION/ DROPS OPHTHALMIC at 08:07

## 2025-01-15 RX ADMIN — FLUTICASONE PROPIONATE 1 SPRAY(S): 50 SPRAY, METERED NASAL at 08:11

## 2025-01-15 RX ADMIN — GABAPENTIN 100 MILLIGRAM(S): 300 CAPSULE ORAL at 06:06

## 2025-01-15 RX ADMIN — GABAPENTIN 100 MILLIGRAM(S): 300 CAPSULE ORAL at 12:57

## 2025-01-15 RX ADMIN — Medication 5 MILLIGRAM(S): at 04:37

## 2025-01-15 RX ADMIN — Medication 4 UNIT(S): at 12:29

## 2025-01-15 RX ADMIN — CLOPIDOGREL BISULFATE 75 MILLIGRAM(S): 75 TABLET, FILM COATED ORAL at 08:07

## 2025-01-15 RX ADMIN — CHLORHEXIDINE GLUCONATE 1 APPLICATION(S): 1.2 RINSE ORAL at 08:07

## 2025-01-15 NOTE — DISCHARGE NOTE NURSING/CASE MANAGEMENT/SOCIAL WORK - FINANCIAL ASSISTANCE
[facility] provides services at a reduced cost to those who are determined to be eligible through [facility]’s financial assistance program. Information regarding [facility]’s financial assistance program can be found by going to [email or web address] or by calling [phone].

## 2025-01-15 NOTE — DISCHARGE NOTE PROVIDER - HOSPITAL COURSE
73y/o male with PMHx HTN, asymptomatic bradycardia, h/o frequent PVCs presented to [facility] for elective LHC. Pt reports having outpatient CT calcium scan which was elevated. Pt was referred to [facility] for LHC today, revealing TVD - pLAD 90%, oCx 75%, and %. EF normal at 60%. Pt transferred to [facility] for CABG work up. Now s/p CABG x3 with Dr. Baltazar. Post op course noteable for ABLA s/p 1PRBC. Patient remains stable from a hemodynamic and respiratory standpoint. Patient deemed stable for discharge home per Dr. Jaramillo.    73y/o male with PMHx HTN, asymptomatic bradycardia, h/o frequent PVCs presented to [facility] for elective LHC. Pt reports having outpatient CT calcium scan which was elevated. Pt was referred to [facility] for LHC today, revealing TVD - pLAD 90%, oCx 75%, and %. EF normal at 60%. Pt transferred to [facility] for CABG work up. Now s/p CABG x3 with Dr. Baltazar. Post op course noteable for ABLA s/p 1PRBC. Patient remains stable from a hemodynamic and respiratory standpoint. Patient deemed stable for discharge home per Dr. Baltazar. Chest tube sutures remain in place and can be removed outpatient.

## 2025-01-15 NOTE — DISCHARGE NOTE PROVIDER - CARE PROVIDER_API CALL
Duane Currie  Cardiovascular Disease  [address] [place] [address]  Phone: [phone]  Fax: [phone]  Established Patient  Follow Up Time:     Malcolm Jaramillo  Thoracic and Cardiac Surgery  [address] [place] [address]  Phone: [phone]  Fax: [phone]  Established Patient  Follow Up Time:     Gaby Holt  Endocrinology/Metab/Diabetes  [address] [place] [address]  Phone: [phone]  Fax: [phone]  Established Patient  Follow Up Time:    Duane Currie  Cardiovascular Disease  [address] [place] [address]  Phone: [phone]  Fax: [phone]  Established Patient  Follow Up Time:     Gaby Holt  Endocrinology/Metab/Diabetes  [address] [place] [address]  Phone: [phone]  Fax: [phone]  Established Patient  Follow Up Time:     Fede Elliott  NP in Adult Health  [address] [place] [address]  Phone: [phone]  Fax: [phone]  Scheduled Appointment: 01/22/2025 10:00 AM

## 2025-01-15 NOTE — CHART NOTE - NSCHARTNOTEFT_GEN_A_CORE
Patient requires a rolling walker due to post-op coronary artery bypass surgery for CAD necessitating DME to help complete their MRADLs.

## 2025-01-15 NOTE — DISCHARGE NOTE PROVIDER - NSDCCPCAREPLAN_GEN_ALL_CORE_FT
PRINCIPAL DISCHARGE DIAGNOSIS  Diagnosis: CAD (coronary artery disease)  Assessment and Plan of Treatment: You had a CABG x 3 on 1/10 with Dr. Baltazar   please refer to discharge instructions in your folder  shower daily with soap and water to the incision, no bathing/hot tubs/pools/swimming  no lotions/creams to incisions  take all your medications as prescribed  keep your follow up appointments      SECONDARY DISCHARGE DIAGNOSES  Diagnosis: HTN (hypertension)  Assessment and Plan of Treatment: Continue Lopressor.    Diagnosis: DM2 (diabetes mellitus, type 2)  Assessment and Plan of Treatment: Take all your medications as prescribed. Please follow up with endocrinology as an outpatient.     PRINCIPAL DISCHARGE DIAGNOSIS  Diagnosis: CAD (coronary artery disease)  Assessment and Plan of Treatment: You had a CABG x 3 on 1/10 with Dr. Baltazar   please refer to discharge instructions in your folder  shower daily with soap and water to the incision, no bathing/hot tubs/pools/swimming  no lotions/creams to incisions  take all your medications as prescribed  keep your follow up appointments      SECONDARY DISCHARGE DIAGNOSES  Diagnosis: HTN (hypertension)  Assessment and Plan of Treatment: Continue Lopressor.    Diagnosis: DM2 (diabetes mellitus, type 2)  Assessment and Plan of Treatment: It is extremely important to follow a diabetic (consistent carbohydrates, LOW/NO ADDED SUGAR) diet and monitor your glucose (sugar) levels. You have an increased risk of complications, including wound infections, due to the diabetes.  You may have been discharged on different medications than you were taking before. Your body reacts differently to the medications after surgery. Please continue to take the medications as prescribed and notify the office, nurse practitioner or your PCP if you have any concerns right away.

## 2025-01-15 NOTE — PROGRESS NOTE ADULT - PROBLEM SELECTOR PLAN 3
New diagnosis per a1c  on Lantus, Premeal 4 and SSI.   Endocrinology following, plan to be DC home on Synjardy 5mg/500mg BID.
New diagnosis per a1c  on Lantus, Premeal 4 and SSI.   Endocrinology following, likely to be DC home on oral meds.
New diagnosis per a1c  Insulin gtt transitioned to basal/bolus per protocol  Endocrinology consult pending
New diagnosis per a1c  on Lantus, Premeal 4 and SSI.   Endocrinology rec appreciated.

## 2025-01-15 NOTE — PROGRESS NOTE ADULT - ASSESSMENT
75y/o male with PMHx HTN, asymptomatic bradycardia, h/o frequent PVCs presented to [facility] for elective LHC. Pt reports having outpatient CT calcium scan which was elevated. Pt was referred to [facility] for LHC today, revealing TVD - pLAD 90%, oCx 75%, and %. EF normal at 60%. Pt transferred to [facility] for CABG work up. Now s/p CABG x3 with Dr. Baltazar. Post op course noteable for ABLA,s/p 1PRBC.

## 2025-01-15 NOTE — DISCHARGE NOTE PROVIDER - NSDCHHCONTACT_GEN_ALL_CORE_FT
As certified below, I, or a nurse practitioner or physician assistant working with me, had a face-to-face encounter that meets the physician face-to-face encounter requirements.
9

## 2025-01-15 NOTE — DISCHARGE NOTE NURSING/CASE MANAGEMENT/SOCIAL WORK - PATIENT PORTAL LINK FT
You can access the FollowMyHealth Patient Portal offered by [facility] by registering at the following website: [email or web address]. By joining [employer]’s FollowMyHealth portal, you will also be able to view your health information using other applications (apps) compatible with our system.

## 2025-01-15 NOTE — DISCHARGE NOTE PROVIDER - DISCHARGE DIET
DASH Diet/Low Sodium Diet/Soft and Bite-Sized Diet DASH Diet/Low Sodium Diet/Consistent Carbohydrate Diabetic Diets

## 2025-01-15 NOTE — PROGRESS NOTE ADULT - PROBLEM SELECTOR PLAN 2
Continue BB as tolerated by HR and BP
Hold ARB in periop setting   lopressor 12.5 bid  c/w hctz  Monitor BP
Hold ARB in periop setting   lopressor 12.5 bid  c/w hctz  Monitor BP
Continue BB as tolerated by HR and BP  Lasix 40 PO QD
Continue BB as tolerated by HR and BP  Lasix 40 PO BID
Continue BB as tolerated by HR and BP  Lasix 40 PO QD

## 2025-01-15 NOTE — DISCHARGE NOTE NURSING/CASE MANAGEMENT/SOCIAL WORK - NSDCFUADDAPPT_GEN_ALL_CORE_FT
Please follow up Fede, Nurse Practitioner, that works directly with Dr. Baltazar on 1/22 at 10 AM at [facility].    The cardiac surgery office is located on the first floor of [facility] at [address] [place] [address]. Please enter through the lobby. A [facility] employee will then direct you where to go.    Your Care Navigator Nurse Practitioner will be in touch to see you in your home within a few days from discharge. The Follow Your Heart program can help ensure you understand your medications, discharge instructions and answer any questions you may have at that time. They are also a great source to address concerns during the day and may be reached at [phone].    Please follow up with your Cardiologist, Endocrinologist and Primary Care Physician 1-2 weeks from discharge.

## 2025-01-15 NOTE — DISCHARGE NOTE PROVIDER - NSDCFUADDAPPT_GEN_ALL_CORE_FT
Please follow up with Dr. Baltazar's office on  _____ at [facility]. The cardiac surgery office is located on the first floor of [facility] at [address] [place], [place]. Please enter through the lobby. A [facility] employee will then direct you where to go.     Please follow up with your Cardiologist, Endocrinologist and Primary Care Physician 1-2 weeks from discharge.     Your Care Navigator Nurse Practitioner will be in touch to see you in your home within a few days from discharge. The Follow Your Heart program can help ensure you understand your medications, discharge instructions and answer any questions you may have at that time. They are also a great source to address concerns during the day and may be reached at [phone].    Please follow up Fede, Nurse Practitioner, that works directly with Dr. Baltazar on 1/22 at 10 AM at [facility].    The cardiac surgery office is located on the first floor of [facility] at [address] [place] [address]. Please enter through the lobby. A [facility] employee will then direct you where to go.    Your Care Navigator Nurse Practitioner will be in touch to see you in your home within a few days from discharge. The Follow Your Heart program can help ensure you understand your medications, discharge instructions and answer any questions you may have at that time. They are also a great source to address concerns during the day and may be reached at [phone].    Please follow up with your Cardiologist, Endocrinologist and Primary Care Physician 1-2 weeks from discharge.

## 2025-01-15 NOTE — DISCHARGE NOTE PROVIDER - NSDCMRMEDTOKEN_GEN_ALL_CORE_FT
acetaminophen 325 mg oral tablet: 2 tab(s) orally every 6 hours  aspirin 81 mg oral delayed release tablet: 1 tab(s) orally once a day  atorvastatin 40 mg oral tablet: 1 tab(s) orally once a day (at bedtime)  Edarbi 40 mg oral tablet: 1 tab(s) orally once a day  Edarbyclor 40 mg-12.5 mg oral tablet: 1 tab(s) orally once a day  hydroCHLOROthiazide 12.5 mg oral tablet: 1 tab(s) orally once a day  metoprolol succinate 25 mg oral capsule, extended release: 1 cap(s) orally once a day  pantoprazole 40 mg oral delayed release tablet: 1 tab(s) orally once a day (before a meal) MDD:1  polyethylene glycol 3350 oral powder for reconstitution: 17 gram(s) orally once a day MDD:17 grams dissolved  senna oral tablet: 2 tab(s) orally once a day (at bedtime) MDD:2 TAB  simethicone 80 mg oral tablet, chewable: 1 tab(s) orally every 12 hours, As needed, Gas MDD:1 TAB  simethicone 80 mg oral tablet, chewable: 1 tab(s) orally every 12 hours, As needed, Gas  traMADol 50 mg oral tablet: 1 tab(s) orally every 4 hours, As needed, Severe Pain (7 - 10) MDD:3 TAB  traMADol 50 mg oral tablet: 0.5 tab(s) orally every 4 hours, As needed, Moderate Pain (4 - 6)  Zetia 10 mg oral tablet: 1 tab(s) orally once a day   amiodarone 200 mg oral tablet: 1 tab(s) orally once a day  aspirin 81 mg oral delayed release tablet: 1 tab(s) orally once a day  atorvastatin 80 mg oral tablet: 1 tab(s) orally once a day (at bedtime)  benzonatate 200 mg oral capsule: 1 cap(s) orally 3 times a day as needed for  cough  clopidogrel 75 mg oral tablet: 1 tab(s) orally once a day  furosemide 40 mg oral tablet: 1 tab(s) orally once a day  metoprolol tartrate 25 mg oral tablet: 1 tab(s) orally 2 times a day  oxyCODONE 5 mg oral tablet: 1 tab(s) orally every 6 hours as needed for  severe pain MDD: 4  pantoprazole 40 mg oral delayed release tablet: 1 tab(s) orally once a day (before a meal) MDD:1  potassium chloride 20 mEq oral tablet, extended release: 2 tab(s) orally once a day take with lasix, stop if lasix is stopped  senna leaf extract oral tablet: 2 tab(s) orally once a day (at bedtime) as needed for  constipation take with narcotic (oxycodone) to prevent constipation  Synjardy 5 mg-500 mg oral tablet: 1 tab(s) orally 2 times a day  Zetia 10 mg oral tablet: 1 tab(s) orally once a day

## 2025-01-15 NOTE — DISCHARGE NOTE PROVIDER - NSDCFUADDINST_GEN_ALL_CORE_FT
Please call the Cardiothoracic Surgery office at [phone] if you are experiencing any shortness of breath, chest pain, fevers or chills, drainage from the incisions, persistent nausea, vomiting or if you have any questions about your medications. If the symptoms are severe, call 911 and go to the nearest hospital.  Please call the Cardiothoracic Surgery office at [phone] if you are experiencing any shortness of breath, chest pain, fevers or chills, drainage from the incisions, persistent nausea, vomiting or if you have any questions about your medications. If the symptoms are severe, call 911 and go to the nearest hospital.     Choose lean meats and poultry without skin and prepare them without added saturated and trans fat.  Eat fish at least twice a week. Recent research shows that eating oily fish containing omega-3 fatty acids (for example, salmon, trout and herring) may help lower your risk of death from coronary artery disease.  Select fat-free, 1 percent fat and low-fat dairy products.  Cut back on foods containing partially hydrogenated vegetable oils to reduce trans fat in your diet.   To lower cholesterol, reduce saturated fat to no more than 5 to 6 percent of total calories. For someone eating 2,000 calories a day, that’s about 13 grams of saturated fat.  Cut back on beverages and foods with added sugars.  Choose and prepare foods with little or no salt. To lower blood pressure, aim to eat no more than 2,400 milligrams of sodium per day. Reducing daily intake to 1,500 mg is desirable because it can lower blood pressure even further.  If you drink alcohol, drink in moderation. That means one drink per day if you’re a woman and two drinks  per day if you’re a man.  Follow the American Heart Association recommendations when you eat out, and keep an eye on your portion sizes.

## 2025-01-15 NOTE — DISCHARGE NOTE PROVIDER - NSDCFUSCHEDAPPT_GEN_ALL_CORE_FT
Duane Currie  [facility] Physician [facility]  CARDIOLOGY [address] [place]  Scheduled Appointment: 02/03/2025     Fede Elliott  [facility] Physician [facility]  CTSURG [address]  Scheduled Appointment: 01/22/2025    Duane Currie  [facility] Physician [facility]  CARDIOLOGY [address]  Scheduled Appointment: 02/03/2025

## 2025-01-15 NOTE — PROGRESS NOTE ADULT - PROBLEM SELECTOR PLAN 1
Pt asymptomatic presented to [facility] for elective LHC after elevated CT calcium score. Transferred to [facility] for further CABG eval.   LHC today revealing pLAD 90%, oCx 75%, dRCA 100%, EF 60%.   Preop work up ordered including carotid US to assess for carotid stenosis, PFTs to eval lung function, TTE to eval EF / WMA / Valve function, and lab work including TSH, prealbumin, Hgb A1C, P2Y12, BNP, T&S, MRSA/MSSA, and UA.   BB and statin   No ASA per Dr. Baltazar   Case d/w Dr. Baltazar   Surgical plan to be determined, pending preop testing results.
Beta blocker as tolerated by HR and SBP  Lipitor for chronic graft patency prophylaxis  Plavix for acute graft closure prophylaxis  Aspirin for acute graft closure prophylaxis  Encourage PO intake  Encourage OOB to chair and ambulation with PT  Encourage deep breathing exercised and coughing  Chest PT and IS use with bedside nurse     Lovenox and SCDs for DVT prophylaxis  Protonix for GI prophylaxis  continue bowel regimen  Strict glucose management and abx for SWI PPX
Pt asymptomatic presented [place] [facility] [place] after elevated CT calcium score. Transferred to [facility] for further CABG eval.   LHC today revealing pLAD 90%, oCx 75%, dRCA 100%, EF 60%.   Preop work up ordered including carotid US to assess for carotid stenosis, PFTs to eval lung function, TTE to eval EF / WMA / Valve function, and lab work including TSH, prealbumin, Hgb A1C, P2Y12, BNP, T&S, MRSA/MSSA, and UA.   BB and statin   No ASA per Dr. Baltazar   Case d/w Dr. Baltazar   Surgical plan to be determined, pending preop testing results.
Beta blocker as tolerated by HR and SBP  Lipitor for chronic graft patency prophylaxis  Plavix for acute graft closure prophylaxis  Aspirin for acute graft closure prophylaxis  Encourage PO intake  Encourage OOB to chair and ambulation with PT  Encourage deep breathing exercised and coughing  Chest PT and IS use with bedside nurse     Lovenox and SCDs for DVT prophylaxis  Protonix for GI prophylaxis  continue bowel regimen  Strict glucose management and abx for SWI PPX

## 2025-01-15 NOTE — PROGRESS NOTE ADULT - SUBJECTIVE AND OBJECTIVE BOX
Subjective:  Patient seen and examined, POD 5 CABG X 3. Patient lying in bed in NAD. +Tolerating diet. +Passing BMs since surgery. +Pain currently controlled. Denies fevers, chills, lightheadedness, dizziness, HA, CP, palpitations, SOB, cough, abdominal pain, N/V, diarrhea, numbness/tingling in extremities, or any other acute complaints. ROS negative x 10 systems except as noted above     PAST MEDICAL & SURGICAL HISTORY:  Hypertension    Bigeminy    Exostosis  left ear canal    BPH (benign prostatic hypertrophy)    Hard of hearing  Exostosis left ear  in May 2015    Lumbar disc disease    Hemorrhoid  2012  Hemorrhoidectomy    Lumbar disc disease  lumbar disc surgery 15 years ago    S/P cholecystectomy         VITAL SIGNS  Vital Signs Last 24 Hrs  T(C): 37.3 (01-15-25 @ 00:17), Max: 37.3 (01-15-25 @ 00:17)  T(F): 99.2 (01-15-25 @ 00:17), Max: 99.2 (01-15-25 @ 00:17)  HR: 74 (01-15-25 @ 00:17) (62 - 85)  BP: 143/70 (01-15-25 @ 00:17) (100/60 - 184/90)  RR: 18 (01-15-25 @ 00:17) (16 - 18)  SpO2: 92% (01-15-25 @ 00:17) (92% - 98%)  on (O2)              MEDICATIONS  acetaminophen     Tablet .. 650 milliGRAM(s) Oral every 6 hours PRN  artificial  tears Solution 1 Drop(s) Both EYES daily  ascorbic acid 500 milliGRAM(s) Oral two times a day  aspirin enteric coated 325 milliGRAM(s) Oral daily  atorvastatin 80 milliGRAM(s) Oral at bedtime  benzonatate 100 milliGRAM(s) Oral every 8 hours PRN  bisacodyl Suppository 10 milliGRAM(s) Rectal once  chlorhexidine 0.12% Liquid 15 milliLiter(s) Oral Mucosa every 12 hours  chlorhexidine 2% Cloths 1 Application(s) Topical daily  chlorhexidine 4% Liquid 1 Application(s) Topical <User Schedule>  clopidogrel Tablet 75 milliGRAM(s) Oral daily  dextrose 5%. 1000 milliLiter(s) IV Continuous <Continuous>  dextrose 5%. 1000 milliLiter(s) IV Continuous <Continuous>  dextrose 50% Injectable 50 milliLiter(s) IV Push every 15 minutes  dextrose 50% Injectable 25 milliLiter(s) IV Push every 15 minutes  dextrose Oral Gel 15 Gram(s) Oral once PRN  enoxaparin Injectable 40 milliGRAM(s) SubCutaneous every 24 hours  ezetimibe 10 milliGRAM(s) Oral daily  fluticasone propionate 50 MICROgram(s)/spray Nasal Spray 1 Spray(s) Both Nostrils two times a day  furosemide    Tablet 40 milliGRAM(s) Oral daily  gabapentin 100 milliGRAM(s) Oral every 8 hours  glucagon  Injectable 1 milliGRAM(s) IntraMuscular once  insulin glargine Injectable (LANTUS) 12 Unit(s) SubCutaneous at bedtime  insulin lispro (ADMELOG) corrective regimen sliding scale   SubCutaneous at bedtime  insulin lispro (ADMELOG) corrective regimen sliding scale   SubCutaneous three times a day before meals  insulin lispro Injectable (ADMELOG) 4 Unit(s) SubCutaneous three times a day before meals  methocarbamol 750 milliGRAM(s) Oral three times a day  metoprolol tartrate 12.5 milliGRAM(s) Oral two times a day  oxyCODONE    IR 5 milliGRAM(s) Oral every 4 hours PRN  oxyCODONE    IR 10 milliGRAM(s) Oral every 4 hours PRN  pantoprazole    Tablet 40 milliGRAM(s) Oral daily  polyethylene glycol 3350 17 Gram(s) Oral daily  senna 2 Tablet(s) Oral at bedtime  sodium chloride 0.9% lock flush 10 milliLiter(s) IV Push every 1 hour PRN  sodium chloride 0.9%. 1000 milliLiter(s) IV Continuous <Continuous>  sodium chloride 0.9%. 1000 milliLiter(s) IV Continuous <Continuous>       @ : @ 07:00  --------------------------------------------------------  IN: 1307 mL / OUT: 1905 mL / NET: -598 mL     @ 07:01  15 @ 01:33  --------------------------------------------------------  IN: 840 mL / OUT: 890 mL / NET: -50 mL      Weights:  Daily     Daily Weight in k.4 (2025 06:37)  Admit Wt: Drug Dosing Weight  Height (cm): 180.3 (2025 18:21)  Weight (kg): 99.8 (2025 18:21)  BMI (kg/m2): 30.7 (2025 18:21)  BSA (m2): 2.2 (2025 18:21)    LABS      139  |  103  |  27.5[H]  ----------------------------<  120[H]  3.8   |  28.0  |  0.91    Ca    7.9[L]      2025 04:31  Mg     2.3                                        9.5    10.03 )-----------( 142      ( 2025 04:31 )             27.8            CAPILLARY BLOOD GLUCOSE    POCT Blood Glucose.: 138 mg/dL (2025 21:28)  POCT Blood Glucose.: 124 mg/dL (2025 17:21)  POCT Blood Glucose.: 137 mg/dL (2025 12:31)  POCT Blood Glucose.: 122 mg/dL (2025 08:35)           DIAGNOSTICS:  All laboratory results, radiology and medications reviewed.    PHYSICAL EXAM  General: NAD  Neurology: Awake, nonfocal, CLARK x 4  Eyes: Scleras clear, PERRLA/ EOMI, Gross vision intact  Neck: Neck supple, trachea midline, No JVD  Respiratory: CTA B/L, No wheezing, rales, rhonchi  CV: RRR, S1S2, no murmurs, rubs or gallops  Abdominal: Soft, NT, ND +BS  Extremities: No edema, + peripheral pulses  Incisions: midsternal mepilex C/D/I, sternum stable, left LE vein harvest site with c/d/i w/ dressing

## 2025-01-15 NOTE — PROGRESS NOTE ADULT - PROVIDER SPECIALTY LIST ADULT
CT Surgery
Critical Care
Endocrinology
CT Surgery
Critical Care
Endocrinology
Surgery
CT Surgery
CT Surgery

## 2025-01-15 NOTE — DISCHARGE NOTE PROVIDER - PROVIDER TOKENS
PROVIDER:[TOKEN:[[id number]],ESTABLISHEDPATIENT:[T]],PROVIDER:[TOKEN:[[id number]],ESTABLISHEDPATIENT:[T]],PROVIDER:[TOKEN:[[id number]],ESTABLISHEDPATIENT:[T]] PROVIDER:[TOKEN:[[id number]],ESTABLISHEDPATIENT:[T]],PROVIDER:[TOKEN:[[id number]],ESTABLISHEDPATIENT:[T]],PROVIDER:[TOKEN:[[id number]],SCHEDULEDAPPT:[01/22/2025],SCHEDULEDAPPTTIME:[10:00 AM]]

## 2025-01-15 NOTE — PROVIDER CONTACT NOTE (OTHER) - ASSESSMENT
Pt asymptomatic. Pt also having coughing episode at this time.   BP: 131/67, HR: 116, O2: 92, T: 99.1

## 2025-01-15 NOTE — DISCHARGE NOTE PROVIDER - NSDCACTIVITY_GEN_ALL_CORE
Do not drive or operate machinery/Showering allowed/Do not make important decisions/Stairs allowed/Walking - Indoors allowed/No heavy lifting/straining/Walking - Outdoors allowed/Follow Instructions Provided by your Surgical Team Do not drive or operate machinery/Showering allowed/Do not make important decisions/Stairs allowed/Walking - Indoors allowed/No heavy lifting/straining/Walking - Outdoors allowed/Follow Instructions Provided by your Surgical Team/Activity as tolerated

## 2025-01-15 NOTE — DISCHARGE NOTE PROVIDER - CARE PROVIDERS DIRECT ADDRESSES
,[email or web address] [email or web address]

## 2025-01-16 ENCOUNTER — NON-APPOINTMENT (OUTPATIENT)
Age: 75
End: 2025-01-16

## 2025-01-17 ENCOUNTER — APPOINTMENT (OUTPATIENT)
Dept: CARE COORDINATION | Facility: HOME HEALTH | Age: 75
End: 2025-01-17
Payer: MEDICARE

## 2025-01-17 VITALS — HEIGHT: 71 IN

## 2025-01-17 PROCEDURE — 99024 POSTOP FOLLOW-UP VISIT: CPT

## 2025-01-17 RX ORDER — POTASSIUM CHLORIDE 1500 MG/1
20 TABLET, EXTENDED RELEASE ORAL
Refills: 0 | Status: ACTIVE | COMMUNITY

## 2025-01-17 RX ORDER — AMIODARONE HYDROCHLORIDE 200 MG/1
200 TABLET ORAL
Refills: 0 | Status: ACTIVE | COMMUNITY

## 2025-01-17 RX ORDER — METOPROLOL TARTRATE 25 MG/1
25 TABLET ORAL TWICE DAILY
Qty: 60 | Refills: 1 | Status: ACTIVE | COMMUNITY

## 2025-01-17 RX ORDER — ATORVASTATIN CALCIUM 80 MG/1
80 TABLET, FILM COATED ORAL
Refills: 0 | Status: ACTIVE | COMMUNITY

## 2025-01-17 RX ORDER — CLOPIDOGREL BISULFATE 75 MG/1
75 TABLET, FILM COATED ORAL
Refills: 0 | Status: ACTIVE | COMMUNITY

## 2025-01-17 RX ORDER — EMPAGLIFLOZIN AND METFORMIN HYDROCHLORIDE 5; 500 MG/1; MG/1
5-500 TABLET ORAL
Refills: 0 | Status: ACTIVE | COMMUNITY

## 2025-01-17 RX ORDER — FUROSEMIDE 40 MG/1
40 TABLET ORAL
Refills: 0 | Status: ACTIVE | COMMUNITY

## 2025-01-21 PROBLEM — Z95.1 S/P CABG X 3: Status: ACTIVE | Noted: 2025-01-21

## 2025-01-22 ENCOUNTER — APPOINTMENT (OUTPATIENT)
Dept: CARDIOTHORACIC SURGERY | Facility: CLINIC | Age: 75
End: 2025-01-22
Payer: MEDICARE

## 2025-01-22 VITALS
WEIGHT: 213 LBS | RESPIRATION RATE: 16 BRPM | DIASTOLIC BLOOD PRESSURE: 90 MMHG | HEART RATE: 67 BPM | OXYGEN SATURATION: 98 % | BODY MASS INDEX: 29.82 KG/M2 | SYSTOLIC BLOOD PRESSURE: 157 MMHG | HEIGHT: 71 IN

## 2025-01-22 DIAGNOSIS — Z95.1 PRESENCE OF AORTOCORONARY BYPASS GRAFT: ICD-10-CM

## 2025-01-22 PROCEDURE — 99024 POSTOP FOLLOW-UP VISIT: CPT

## 2025-01-22 RX ORDER — BENZONATATE 100 MG/1
CAPSULE ORAL
Refills: 0 | Status: COMPLETED | COMMUNITY
End: 2025-01-22

## 2025-01-24 ENCOUNTER — NON-APPOINTMENT (OUTPATIENT)
Age: 75
End: 2025-01-24

## 2025-02-03 ENCOUNTER — APPOINTMENT (OUTPATIENT)
Dept: CARDIOLOGY | Facility: CLINIC | Age: 75
End: 2025-02-03
Payer: MEDICARE

## 2025-02-03 VITALS
HEART RATE: 59 BPM | DIASTOLIC BLOOD PRESSURE: 80 MMHG | SYSTOLIC BLOOD PRESSURE: 150 MMHG | WEIGHT: 200 LBS | HEIGHT: 71 IN | BODY MASS INDEX: 28 KG/M2 | RESPIRATION RATE: 14 BRPM | OXYGEN SATURATION: 98 %

## 2025-02-03 DIAGNOSIS — I49.3 VENTRICULAR PREMATURE DEPOLARIZATION: ICD-10-CM

## 2025-02-03 DIAGNOSIS — Z95.1 PRESENCE OF AORTOCORONARY BYPASS GRAFT: ICD-10-CM

## 2025-02-03 DIAGNOSIS — I10 ESSENTIAL (PRIMARY) HYPERTENSION: ICD-10-CM

## 2025-02-03 DIAGNOSIS — E78.00 PURE HYPERCHOLESTEROLEMIA, UNSPECIFIED: ICD-10-CM

## 2025-02-03 DIAGNOSIS — I25.10 ATHEROSCLEROTIC HEART DISEASE OF NATIVE CORONARY ARTERY W/OUT ANGINA PECTORIS: ICD-10-CM

## 2025-02-03 PROCEDURE — 99215 OFFICE O/P EST HI 40 MIN: CPT

## 2025-02-03 PROCEDURE — G2211 COMPLEX E/M VISIT ADD ON: CPT

## 2025-02-03 PROCEDURE — 93242 EXT ECG>48HR<7D RECORDING: CPT

## 2025-02-20 RX ORDER — LOSARTAN POTASSIUM 25 MG/1
25 TABLET, FILM COATED ORAL DAILY
Qty: 30 | Refills: 0 | Status: ACTIVE | COMMUNITY
Start: 2025-02-20 | End: 1900-01-01

## 2025-02-21 PROCEDURE — 93244 EXT ECG>48HR<7D REV&INTERPJ: CPT

## 2025-03-18 ENCOUNTER — APPOINTMENT (OUTPATIENT)
Dept: CARDIOLOGY | Facility: CLINIC | Age: 75
End: 2025-03-18
Payer: MEDICARE

## 2025-03-18 VITALS
DIASTOLIC BLOOD PRESSURE: 76 MMHG | BODY MASS INDEX: 28.7 KG/M2 | HEIGHT: 71 IN | WEIGHT: 205 LBS | HEART RATE: 59 BPM | SYSTOLIC BLOOD PRESSURE: 174 MMHG | RESPIRATION RATE: 14 BRPM | OXYGEN SATURATION: 97 %

## 2025-03-18 DIAGNOSIS — I25.10 ATHEROSCLEROTIC HEART DISEASE OF NATIVE CORONARY ARTERY W/OUT ANGINA PECTORIS: ICD-10-CM

## 2025-03-18 DIAGNOSIS — H90.2 CONDUCTIVE HEARING LOSS, UNSPECIFIED: ICD-10-CM

## 2025-03-18 DIAGNOSIS — R93.1 ABNORMAL FINDINGS ON DIAGNOSTIC IMAGING OF HEART AND CORONARY CIRCULATION: ICD-10-CM

## 2025-03-18 DIAGNOSIS — I10 ESSENTIAL (PRIMARY) HYPERTENSION: ICD-10-CM

## 2025-03-18 DIAGNOSIS — H61.812 EXOSTOSIS OF LEFT EXTERNAL CANAL: ICD-10-CM

## 2025-03-18 DIAGNOSIS — Z98.890 OTHER SPECIFIED POSTPROCEDURAL STATES: ICD-10-CM

## 2025-03-18 DIAGNOSIS — Z95.1 PRESENCE OF AORTOCORONARY BYPASS GRAFT: ICD-10-CM

## 2025-03-18 DIAGNOSIS — E78.00 PURE HYPERCHOLESTEROLEMIA, UNSPECIFIED: ICD-10-CM

## 2025-03-18 PROCEDURE — 99214 OFFICE O/P EST MOD 30 MIN: CPT

## 2025-03-18 RX ORDER — LOSARTAN POTASSIUM 50 MG/1
50 TABLET, FILM COATED ORAL DAILY
Refills: 0 | Status: ACTIVE | COMMUNITY

## 2025-03-18 RX ORDER — LOSARTAN POTASSIUM AND HYDROCHLOROTHIAZIDE 12.5; 5 MG/1; MG/1
50-12.5 TABLET ORAL DAILY
Qty: 90 | Refills: 1 | Status: ACTIVE | COMMUNITY
Start: 2025-03-18 | End: 1900-01-01

## 2025-03-20 LAB — A1CG - A1C WITH ESTIMATED AVERAGE GLUCOSE: 5.6

## 2025-04-07 ENCOUNTER — APPOINTMENT (OUTPATIENT)
Dept: CARDIOLOGY | Facility: CLINIC | Age: 75
End: 2025-04-07
Payer: MEDICARE

## 2025-04-07 VITALS
HEART RATE: 63 BPM | SYSTOLIC BLOOD PRESSURE: 144 MMHG | WEIGHT: 211 LBS | DIASTOLIC BLOOD PRESSURE: 82 MMHG | BODY MASS INDEX: 29.43 KG/M2 | OXYGEN SATURATION: 99 %

## 2025-04-07 DIAGNOSIS — I10 ESSENTIAL (PRIMARY) HYPERTENSION: ICD-10-CM

## 2025-04-07 DIAGNOSIS — H61.812 EXOSTOSIS OF LEFT EXTERNAL CANAL: ICD-10-CM

## 2025-04-07 DIAGNOSIS — H90.2 CONDUCTIVE HEARING LOSS, UNSPECIFIED: ICD-10-CM

## 2025-04-07 DIAGNOSIS — E78.00 PURE HYPERCHOLESTEROLEMIA, UNSPECIFIED: ICD-10-CM

## 2025-04-07 DIAGNOSIS — I25.10 ATHEROSCLEROTIC HEART DISEASE OF NATIVE CORONARY ARTERY W/OUT ANGINA PECTORIS: ICD-10-CM

## 2025-04-07 DIAGNOSIS — Z98.890 OTHER SPECIFIED POSTPROCEDURAL STATES: ICD-10-CM

## 2025-04-07 DIAGNOSIS — I49.3 VENTRICULAR PREMATURE DEPOLARIZATION: ICD-10-CM

## 2025-04-07 DIAGNOSIS — Z95.1 PRESENCE OF AORTOCORONARY BYPASS GRAFT: ICD-10-CM

## 2025-04-07 PROCEDURE — G2211 COMPLEX E/M VISIT ADD ON: CPT

## 2025-04-07 PROCEDURE — 99214 OFFICE O/P EST MOD 30 MIN: CPT

## 2025-04-28 ENCOUNTER — APPOINTMENT (OUTPATIENT)
Dept: CARDIOLOGY | Facility: CLINIC | Age: 75
End: 2025-04-28
Payer: MEDICARE

## 2025-04-28 VITALS
HEART RATE: 56 BPM | BODY MASS INDEX: 29.57 KG/M2 | WEIGHT: 212 LBS | OXYGEN SATURATION: 98 % | DIASTOLIC BLOOD PRESSURE: 74 MMHG | SYSTOLIC BLOOD PRESSURE: 136 MMHG

## 2025-04-28 DIAGNOSIS — Z98.890 OTHER SPECIFIED POSTPROCEDURAL STATES: ICD-10-CM

## 2025-04-28 DIAGNOSIS — I25.10 ATHEROSCLEROTIC HEART DISEASE OF NATIVE CORONARY ARTERY W/OUT ANGINA PECTORIS: ICD-10-CM

## 2025-04-28 DIAGNOSIS — I10 ESSENTIAL (PRIMARY) HYPERTENSION: ICD-10-CM

## 2025-04-28 DIAGNOSIS — H90.2 CONDUCTIVE HEARING LOSS, UNSPECIFIED: ICD-10-CM

## 2025-04-28 DIAGNOSIS — E78.00 PURE HYPERCHOLESTEROLEMIA, UNSPECIFIED: ICD-10-CM

## 2025-04-28 DIAGNOSIS — Z95.1 PRESENCE OF AORTOCORONARY BYPASS GRAFT: ICD-10-CM

## 2025-04-28 PROCEDURE — 99214 OFFICE O/P EST MOD 30 MIN: CPT

## 2025-04-28 PROCEDURE — G2211 COMPLEX E/M VISIT ADD ON: CPT

## 2025-08-16 ENCOUNTER — RX RENEWAL (OUTPATIENT)
Age: 75
End: 2025-08-16

## (undated) DEVICE — STOPCOCK 3 WAY W SWIVEL MALE LUER LOCK

## (undated) DEVICE — POSITIONER FOAM EGG CRATE ULNAR 2PCS (PINK)

## (undated) DEVICE — DRSG TEGADERM 4 X 4.75"

## (undated) DEVICE — SYNOVIS VASCULAR PROBE 1.5MM 15CM

## (undated) DEVICE — STABILIZER TISSUE OCTOPUS EVOLUTION

## (undated) DEVICE — SUT SILK 5-0 60" TIES

## (undated) DEVICE — BLOWER MISTER VIPER II

## (undated) DEVICE — VISITEC 4X4

## (undated) DEVICE — DRAPE TOWEL BLUE 17" X 24"

## (undated) DEVICE — TONGUE DEPRESSOR

## (undated) DEVICE — MAXI-FLO SUCTION CATHETER KIT 14FR STRAIGHT

## (undated) DEVICE — LAP PAD 18 X 18"

## (undated) DEVICE — DRAPE IOBAN 33" X 23"

## (undated) DEVICE — STEALTH CLAMP INSERT FIBRA/FIBRA 60MM

## (undated) DEVICE — GLV 7.5 PROTEXIS (WHITE)

## (undated) DEVICE — PACK OPEN HEART VAMP PLUS

## (undated) DEVICE — SUT VICRYL 2-0 27" CT-1 UNDYED

## (undated) DEVICE — SUT ETHIBOND 1 30" OS8

## (undated) DEVICE — SUT SILK 0 30" SH

## (undated) DEVICE — BULLDOG SPRING CLIP 6MM SOFT/SOFT

## (undated) DEVICE — SUT ETHIBOND 2-0 36" SH

## (undated) DEVICE — SUT PROLENE 6-0 30" C-1

## (undated) DEVICE — PACK VALVE

## (undated) DEVICE — SUT PROLENE 7-0 24" BV-1

## (undated) DEVICE — SUT DOUBLE 6 WIRE STERNAL

## (undated) DEVICE — SUT ETHIBOND 3-0 36" RB-1

## (undated) DEVICE — PREP CHLORAPREP HI-LITE ORANGE 26ML

## (undated) DEVICE — MARKING PEN W RULER

## (undated) DEVICE — SUT SILK 0 30" TIES

## (undated) DEVICE — SYR LUER LOK 20CC

## (undated) DEVICE — SUT PROLENE 4-0 30" SH-1

## (undated) DEVICE — PREP SCRUB BRUSH W CHG 4%

## (undated) DEVICE — ELCTR MULTIFUNCTION DEFIBRILLATION ELECTRODE EDGE SYSTEM ADULT

## (undated) DEVICE — SUT ETHIBOND 4-0 36" RB-1

## (undated) DEVICE — SUT PDS II 2-0 27" CT-1

## (undated) DEVICE — DRSG OPSITE 13.75 X 4"

## (undated) DEVICE — DRAPE 3/4 SHEET 52X76"

## (undated) DEVICE — SUT PROLENE 3-0 36" SH

## (undated) DEVICE — SUT PROLENE 7-0 24" BV175-6

## (undated) DEVICE — SUT ETHIBOND 2-0 4-30" RB-1 WHITE

## (undated) DEVICE — SOL ANTI FOG (FRED)

## (undated) DEVICE — SUT PROLENE 4-0 36" SH

## (undated) DEVICE — SUT VICRYL 0 36" CTX UNDYED

## (undated) DEVICE — WOUND IRR IRRISEPT W 0.5 CHG

## (undated) DEVICE — DRSG MEPILEX 10 X 10CM (4 X 4") AG

## (undated) DEVICE — DRSG OPSITE 2.5 X 2"

## (undated) DEVICE — TUBING INSUFFLATION LAP FILTER 10FT

## (undated) DEVICE — SUT MONOCRYL 4-0 27" PS-2 UNDYED

## (undated) DEVICE — AORTIC PUNCH 5MM STANDARD HANDLE

## (undated) DEVICE — DRAPE SLUSH / WARMER 44 X 66"

## (undated) DEVICE — SYS VEIN HARVESTING VIRTUOSAPH PLUS W/ RADIAL

## (undated) DEVICE — WARMING BLANKET DUO-THERM HYPER/HYPOTHERM ADULT

## (undated) DEVICE — STAPLER SKIN PROXIMATE

## (undated) DEVICE — SUT SILK 2-0 18" SH (POP-OFF)

## (undated) DEVICE — PRESSURE INFUSOR BAG 1000ML

## (undated) DEVICE — SOL IRR POUR NS 0.9% 1000ML

## (undated) DEVICE — SOL INJ NS 0.9% 1000ML

## (undated) DEVICE — BEAVER BLADE MINI SHARP ALL ROUND (BLUE)

## (undated) DEVICE — ELCTR BOVIE PENCIL HANDPIECE ROCKER SWITCH 15FT

## (undated) DEVICE — SUT VICRYL 3-0 27" CT-1

## (undated) DEVICE — DRSG MEPILEX 10 X 30CM (4 X 12") WHITE

## (undated) DEVICE — NDL COUNTER FOAM AND MAGNET 40-70

## (undated) DEVICE — ELCTR BOVIE BLADE 3/4" EXTENDED LENGTH 6"

## (undated) DEVICE — VESSEL LOOP MAXI-RED  0.120" X 16"

## (undated) DEVICE — CONNECTOR STRAIGHT 3/8 X 1/2"

## (undated) DEVICE — SUT PROLENE 4-0 36" RB-1

## (undated) DEVICE — DRSG MEPILEX 10 X 25CM (4 X 10") POST OP AG SILVER

## (undated) DEVICE — VASOVIEW HEMOPRO 2

## (undated) DEVICE — SUT PLEDGET 9MM X 4MM X 1.5MM

## (undated) DEVICE — CHEST DRAIN PLEUR-EVAC DRY/WET ADULT-PEDS SINGLE (QUICK)

## (undated) DEVICE — SUT SILK 4-0 30" RB-1

## (undated) DEVICE — GOWN TRIMAX LG

## (undated) DEVICE — SUT VICRYL 1 36" CTX UNDYED